# Patient Record
Sex: MALE | Race: WHITE | HISPANIC OR LATINO | Employment: FULL TIME | URBAN - METROPOLITAN AREA
[De-identification: names, ages, dates, MRNs, and addresses within clinical notes are randomized per-mention and may not be internally consistent; named-entity substitution may affect disease eponyms.]

---

## 2021-02-25 ENCOUNTER — IMMUNIZATION (OUTPATIENT)
Dept: OBSTETRICS AND GYNECOLOGY | Facility: CLINIC | Age: 66
End: 2021-02-25
Payer: MEDICARE

## 2021-02-25 DIAGNOSIS — Z23 NEED FOR VACCINATION: Primary | ICD-10-CM

## 2021-02-25 PROCEDURE — 0001A COVID-19, MRNA, LNP-S, PF, 30 MCG/0.3 ML DOSE VACCINE: ICD-10-PCS | Mod: CV19,,, | Performed by: FAMILY MEDICINE

## 2021-02-25 PROCEDURE — 91300 COVID-19, MRNA, LNP-S, PF, 30 MCG/0.3 ML DOSE VACCINE: ICD-10-PCS | Mod: ,,, | Performed by: FAMILY MEDICINE

## 2021-02-25 PROCEDURE — 91300 COVID-19, MRNA, LNP-S, PF, 30 MCG/0.3 ML DOSE VACCINE: CPT | Mod: ,,, | Performed by: FAMILY MEDICINE

## 2021-02-25 PROCEDURE — 0001A COVID-19, MRNA, LNP-S, PF, 30 MCG/0.3 ML DOSE VACCINE: CPT | Mod: CV19,,, | Performed by: FAMILY MEDICINE

## 2021-03-18 ENCOUNTER — IMMUNIZATION (OUTPATIENT)
Dept: OBSTETRICS AND GYNECOLOGY | Facility: CLINIC | Age: 66
End: 2021-03-18

## 2021-03-18 DIAGNOSIS — Z23 NEED FOR VACCINATION: Primary | ICD-10-CM

## 2021-03-18 PROCEDURE — 0002A COVID-19, MRNA, LNP-S, PF, 30 MCG/0.3 ML DOSE VACCINE: ICD-10-PCS | Mod: CV19,,, | Performed by: FAMILY MEDICINE

## 2021-03-18 PROCEDURE — 0002A COVID-19, MRNA, LNP-S, PF, 30 MCG/0.3 ML DOSE VACCINE: CPT | Mod: CV19,,, | Performed by: FAMILY MEDICINE

## 2021-03-18 PROCEDURE — 91300 COVID-19, MRNA, LNP-S, PF, 30 MCG/0.3 ML DOSE VACCINE: ICD-10-PCS | Mod: ,,, | Performed by: FAMILY MEDICINE

## 2021-03-18 PROCEDURE — 91300 COVID-19, MRNA, LNP-S, PF, 30 MCG/0.3 ML DOSE VACCINE: CPT | Mod: ,,, | Performed by: FAMILY MEDICINE

## 2022-12-21 ENCOUNTER — OFFICE VISIT (OUTPATIENT)
Dept: INTERNAL MEDICINE | Facility: CLINIC | Age: 67
End: 2022-12-21
Payer: MEDICARE

## 2022-12-21 VITALS
BODY MASS INDEX: 40.88 KG/M2 | DIASTOLIC BLOOD PRESSURE: 74 MMHG | HEART RATE: 95 BPM | RESPIRATION RATE: 20 BRPM | SYSTOLIC BLOOD PRESSURE: 134 MMHG | WEIGHT: 301.81 LBS | HEIGHT: 72 IN | OXYGEN SATURATION: 96 %

## 2022-12-21 DIAGNOSIS — G47.33 OSA (OBSTRUCTIVE SLEEP APNEA): ICD-10-CM

## 2022-12-21 DIAGNOSIS — R06.09 DOE (DYSPNEA ON EXERTION): ICD-10-CM

## 2022-12-21 DIAGNOSIS — Z11.59 SCREENING FOR VIRAL DISEASE: ICD-10-CM

## 2022-12-21 DIAGNOSIS — E66.01 CLASS 3 SEVERE OBESITY DUE TO EXCESS CALORIES WITH SERIOUS COMORBIDITY AND BODY MASS INDEX (BMI) OF 40.0 TO 44.9 IN ADULT: ICD-10-CM

## 2022-12-21 DIAGNOSIS — E78.00 PURE HYPERCHOLESTEROLEMIA: ICD-10-CM

## 2022-12-21 DIAGNOSIS — N40.0 BENIGN PROSTATIC HYPERPLASIA WITHOUT LOWER URINARY TRACT SYMPTOMS: ICD-10-CM

## 2022-12-21 DIAGNOSIS — J43.9 PULMONARY EMPHYSEMA, UNSPECIFIED EMPHYSEMA TYPE: ICD-10-CM

## 2022-12-21 DIAGNOSIS — L40.0 PSORIASIS VULGARIS: ICD-10-CM

## 2022-12-21 DIAGNOSIS — Z23 IMMUNIZATION DUE: ICD-10-CM

## 2022-12-21 DIAGNOSIS — G57.93 NEUROPATHY OF BOTH FEET: ICD-10-CM

## 2022-12-21 DIAGNOSIS — F51.01 PRIMARY INSOMNIA: ICD-10-CM

## 2022-12-21 DIAGNOSIS — E11.9 TYPE 2 DIABETES MELLITUS WITHOUT COMPLICATION, WITHOUT LONG-TERM CURRENT USE OF INSULIN: ICD-10-CM

## 2022-12-21 DIAGNOSIS — Z00.00 ENCOUNTER FOR MEDICAL EXAMINATION TO ESTABLISH CARE: Primary | ICD-10-CM

## 2022-12-21 DIAGNOSIS — Z87.891 FORMER CIGARETTE SMOKER: ICD-10-CM

## 2022-12-21 DIAGNOSIS — I10 PRIMARY HYPERTENSION: ICD-10-CM

## 2022-12-21 PROBLEM — E66.813 CLASS 3 SEVERE OBESITY DUE TO EXCESS CALORIES WITH SERIOUS COMORBIDITY AND BODY MASS INDEX (BMI) OF 40.0 TO 44.9 IN ADULT: Status: ACTIVE | Noted: 2022-12-21

## 2022-12-21 PROCEDURE — 99499 RISK ADDL DX/OHS AUDIT: ICD-10-PCS | Mod: S$GLB,,, | Performed by: NURSE PRACTITIONER

## 2022-12-21 PROCEDURE — 1101F PT FALLS ASSESS-DOCD LE1/YR: CPT | Mod: HCNC,CPTII,S$GLB, | Performed by: NURSE PRACTITIONER

## 2022-12-21 PROCEDURE — 99499 UNLISTED E&M SERVICE: CPT | Mod: S$GLB,,, | Performed by: NURSE PRACTITIONER

## 2022-12-21 PROCEDURE — 3288F PR FALLS RISK ASSESSMENT DOCUMENTED: ICD-10-PCS | Mod: HCNC,CPTII,S$GLB, | Performed by: NURSE PRACTITIONER

## 2022-12-21 PROCEDURE — 99999 PR PBB SHADOW E&M-EST. PATIENT-LVL IV: ICD-10-PCS | Mod: PBBFAC,HCNC,, | Performed by: NURSE PRACTITIONER

## 2022-12-21 PROCEDURE — 1125F AMNT PAIN NOTED PAIN PRSNT: CPT | Mod: HCNC,CPTII,S$GLB, | Performed by: NURSE PRACTITIONER

## 2022-12-21 PROCEDURE — G0009 ADMIN PNEUMOCOCCAL VACCINE: HCPCS | Mod: HCNC,S$GLB,, | Performed by: NURSE PRACTITIONER

## 2022-12-21 PROCEDURE — G0009 PNEUMOCOCCAL CONJUGATE VACCINE 20-VALENT: ICD-10-PCS | Mod: HCNC,S$GLB,, | Performed by: NURSE PRACTITIONER

## 2022-12-21 PROCEDURE — 1125F PR PAIN SEVERITY QUANTIFIED, PAIN PRESENT: ICD-10-PCS | Mod: HCNC,CPTII,S$GLB, | Performed by: NURSE PRACTITIONER

## 2022-12-21 PROCEDURE — 99204 PR OFFICE/OUTPT VISIT, NEW, LEVL IV, 45-59 MIN: ICD-10-PCS | Mod: HCNC,25,S$GLB, | Performed by: NURSE PRACTITIONER

## 2022-12-21 PROCEDURE — 1159F MED LIST DOCD IN RCRD: CPT | Mod: HCNC,CPTII,S$GLB, | Performed by: NURSE PRACTITIONER

## 2022-12-21 PROCEDURE — 3075F PR MOST RECENT SYSTOLIC BLOOD PRESS GE 130-139MM HG: ICD-10-PCS | Mod: HCNC,CPTII,S$GLB, | Performed by: NURSE PRACTITIONER

## 2022-12-21 PROCEDURE — 3288F FALL RISK ASSESSMENT DOCD: CPT | Mod: HCNC,CPTII,S$GLB, | Performed by: NURSE PRACTITIONER

## 2022-12-21 PROCEDURE — 1159F PR MEDICATION LIST DOCUMENTED IN MEDICAL RECORD: ICD-10-PCS | Mod: HCNC,CPTII,S$GLB, | Performed by: NURSE PRACTITIONER

## 2022-12-21 PROCEDURE — 3078F PR MOST RECENT DIASTOLIC BLOOD PRESSURE < 80 MM HG: ICD-10-PCS | Mod: HCNC,CPTII,S$GLB, | Performed by: NURSE PRACTITIONER

## 2022-12-21 PROCEDURE — 99204 OFFICE O/P NEW MOD 45 MIN: CPT | Mod: HCNC,25,S$GLB, | Performed by: NURSE PRACTITIONER

## 2022-12-21 PROCEDURE — 3008F PR BODY MASS INDEX (BMI) DOCUMENTED: ICD-10-PCS | Mod: HCNC,CPTII,S$GLB, | Performed by: NURSE PRACTITIONER

## 2022-12-21 PROCEDURE — 1101F PR PT FALLS ASSESS DOC 0-1 FALLS W/OUT INJ PAST YR: ICD-10-PCS | Mod: HCNC,CPTII,S$GLB, | Performed by: NURSE PRACTITIONER

## 2022-12-21 PROCEDURE — 90677 PNEUMOCOCCAL CONJUGATE VACCINE 20-VALENT: ICD-10-PCS | Mod: HCNC,S$GLB,, | Performed by: NURSE PRACTITIONER

## 2022-12-21 PROCEDURE — 90677 PCV20 VACCINE IM: CPT | Mod: HCNC,S$GLB,, | Performed by: NURSE PRACTITIONER

## 2022-12-21 PROCEDURE — 3075F SYST BP GE 130 - 139MM HG: CPT | Mod: HCNC,CPTII,S$GLB, | Performed by: NURSE PRACTITIONER

## 2022-12-21 PROCEDURE — 4010F ACE/ARB THERAPY RXD/TAKEN: CPT | Mod: HCNC,CPTII,S$GLB, | Performed by: NURSE PRACTITIONER

## 2022-12-21 PROCEDURE — 3078F DIAST BP <80 MM HG: CPT | Mod: HCNC,CPTII,S$GLB, | Performed by: NURSE PRACTITIONER

## 2022-12-21 PROCEDURE — 3008F BODY MASS INDEX DOCD: CPT | Mod: HCNC,CPTII,S$GLB, | Performed by: NURSE PRACTITIONER

## 2022-12-21 PROCEDURE — 99999 PR PBB SHADOW E&M-EST. PATIENT-LVL IV: CPT | Mod: PBBFAC,HCNC,, | Performed by: NURSE PRACTITIONER

## 2022-12-21 PROCEDURE — 4010F PR ACE/ARB THEARPY RXD/TAKEN: ICD-10-PCS | Mod: HCNC,CPTII,S$GLB, | Performed by: NURSE PRACTITIONER

## 2022-12-21 RX ORDER — TADALAFIL 5 MG/1
TABLET ORAL
COMMUNITY
Start: 2020-01-09

## 2022-12-21 RX ORDER — IRBESARTAN 150 MG/1
TABLET ORAL
COMMUNITY
Start: 2019-10-13 | End: 2022-12-21 | Stop reason: SDUPTHER

## 2022-12-21 RX ORDER — IRBESARTAN 150 MG/1
TABLET ORAL
Qty: 30 TABLET | Refills: 5 | Status: SHIPPED | OUTPATIENT
Start: 2022-12-21 | End: 2023-06-20 | Stop reason: SDUPTHER

## 2022-12-21 RX ORDER — MOMETASONE FUROATE 1 MG/G
CREAM TOPICAL
COMMUNITY

## 2022-12-21 RX ORDER — ROSUVASTATIN CALCIUM 20 MG/1
TABLET, COATED ORAL
COMMUNITY
End: 2023-09-18 | Stop reason: SDUPTHER

## 2022-12-21 RX ORDER — QUETIAPINE FUMARATE 25 MG/1
25 TABLET, FILM COATED ORAL NIGHTLY PRN
COMMUNITY

## 2022-12-21 RX ORDER — CLOBETASOL PROPIONATE 0.5 MG/G
OINTMENT TOPICAL
COMMUNITY
End: 2024-01-11

## 2022-12-21 RX ORDER — CLOBETASOL PROPIONATE 0.46 MG/ML
SOLUTION TOPICAL
COMMUNITY

## 2022-12-21 RX ORDER — KETOCONAZOLE 20 MG/ML
SHAMPOO, SUSPENSION TOPICAL
COMMUNITY

## 2022-12-21 RX ORDER — GABAPENTIN 800 MG/1
800 TABLET ORAL 2 TIMES DAILY
COMMUNITY
End: 2023-03-30 | Stop reason: SDUPTHER

## 2022-12-21 NOTE — ASSESSMENT & PLAN NOTE
"Essential (primary) hypertension;   Dx - 10 " off and on" , ran out of medications; irbesartan - 150mg  BP at goal ;  134/74    Refill irbesartan       "

## 2022-12-21 NOTE — PROGRESS NOTES
"Subjective:           Patient ID: Javad Coughlin is a 67 y.o. male.    Chief Complaint: Establish Care    Javad Coughlin is a 67 y.o. male, here to establish care     Last labs     Type 2 diabetes mellitus without complications  Dx 3 years ago ; now diet controlled  Losing weight   Notes he was 318 down to 301    Pure hypercholesterolemia, - takes rosuvastatin   Dx 3 yrs ago with DM2       Benign prostatic hyperplasia without lower urinary tract symptoms  Hyperspade -   No recent urology  Tadalafil for prostate     Essential (primary) hypertension;   Dx - 10yrs,  " off and on" , ran out of medications; irbesartan - 150mg     Obstructive sleep apnea (adult)   Dx - 30 yrs ago ; used CPAP for a while then it got old    Dermatology of psoriasis     Gabapentin for neuropathy- bilateral feet   800mg tid- takes 1 in am and 1.5 at night     Quetipaine for insomnia      Recent Covid  ;   Notes eczema, Pl[place] psoriasis' going this PM for injeciton= tremfya   Dr. Irwin Braun     Notes emphysema, quits smoking years ago, 4 years     Obesity -     Social ; , youngest of 12 children;   parents  in their 90s, no premature heart hx    Health Maintenance:  -CRC:colonoscopy with Dr. SONAL Mark last year - get records   -PSA- due     -Bone Density  -AAA- due + Ex smoker   -tobacco- quit 4 yrs ago, smoked 1-2ppd since age 11   -Vaccines- due for Tdap, PCV - give today    Review of Systems   Constitutional: Negative.    HENT:  Negative for congestion, ear pain, sinus pressure, sneezing and sore throat.    Eyes: Negative.    Respiratory:  Positive for shortness of breath (HOOPER). Negative for cough, chest tightness and wheezing.    Cardiovascular: Negative.  Negative for chest pain.   Gastrointestinal:  Negative for abdominal pain, blood in stool, constipation, diarrhea, nausea and vomiting.   Genitourinary:  Negative for difficulty urinating, dysuria and flank pain.   Musculoskeletal: Negative.  Negative for " joint swelling.   Skin:  Positive for rash.   Neurological:  Negative for dizziness, weakness and headaches.   Hematological: Negative.    Psychiatric/Behavioral: Negative.  Negative for behavioral problems and confusion.      Objective:      Physical Exam  Vitals and nursing note reviewed.   Constitutional:       General: He is not in acute distress.     Appearance: He is well-developed. He is obese. He is not ill-appearing, toxic-appearing or diaphoretic.   HENT:      Head: Normocephalic and atraumatic.      Nose: Nose normal.   Eyes:      Pupils: Pupils are equal, round, and reactive to light.   Cardiovascular:      Rate and Rhythm: Normal rate and regular rhythm.      Heart sounds: No murmur heard.  Pulmonary:      Effort: Pulmonary effort is normal. No respiratory distress.      Breath sounds: Normal breath sounds. No stridor. No wheezing or rhonchi.      Comments: Diminished   Abdominal:      General: Bowel sounds are normal.      Palpations: Abdomen is soft.   Musculoskeletal:         General: No swelling, tenderness, deformity or signs of injury. Normal range of motion.      Cervical back: Normal range of motion and neck supple.      Right lower leg: No edema.      Left lower leg: No edema.   Skin:     General: Skin is warm and dry.      Capillary Refill: Capillary refill takes less than 2 seconds.      Findings: Rash (face, forhead) present.   Neurological:      Mental Status: He is alert and oriented to person, place, and time.   Psychiatric:         Behavior: Behavior normal.         Thought Content: Thought content normal.         Judgment: Judgment normal.       Assessment:       1. Encounter for medical examination to establish care    2. Primary hypertension    3. Type 2 diabetes mellitus without complication, without long-term current use of insulin    4. Benign prostatic hyperplasia without lower urinary tract symptoms    5. ANTONI (obstructive sleep apnea)    6. Pure hypercholesterolemia    7. HOOPER  "(dyspnea on exertion)    8. Former cigarette smoker    9. Screening for viral disease    10. Pulmonary emphysema, unspecified emphysema type    11. Class 3 severe obesity due to excess calories with serious comorbidity and body mass index (BMI) of 40.0 to 44.9 in adult    12. Psoriasis vulgaris    13. Neuropathy of both feet    14. Primary insomnia        Plan:   1. Encounter for medical examination to establish care  -     CBC Auto Differential; Future; Expected date: 12/21/2022  -     Comprehensive Metabolic Panel; Future; Expected date: 12/21/2022  -     Echo; Future; Expected date: 12/22/2022    2. Primary hypertension  Overview:  12/21/22 Dx approx 10 yrs ago     Assessment & Plan:  Essential (primary) hypertension;   Dx - 10 " off and on" , ran out of medications; irbesartan - 150mg  BP at goal ;  134/74    Refill irbesartan         Orders:  -     irbesartan (AVAPRO) 150 MG tablet; irbesartan 150 mg tablet  Dispense: 30 tablet; Refill: 5  -     CBC Auto Differential; Future; Expected date: 12/21/2022  -     Comprehensive Metabolic Panel; Future; Expected date: 12/21/2022  -     TSH; Future; Expected date: 12/21/2022  -     US AAA Screening; Future; Expected date: 12/21/2022  -     Echo; Future; Expected date: 12/22/2022    3. Type 2 diabetes mellitus without complication, without long-term current use of insulin  Overview:  Prior Dx 3 yrs ago; tx with Ozempic- was getting samples     Assessment & Plan:  Pt states he lost weight and is no longer a diabetic   Will get labs     Orders:  -     Hemoglobin A1C; Future; Expected date: 12/21/2022  -     Lipid Panel; Future; Expected date: 12/21/2022  -     Microalbumin/Creatinine Ratio, Urine; Future; Expected date: 12/21/2022  -     Echo; Future; Expected date: 12/22/2022    4. Benign prostatic hyperplasia without lower urinary tract symptoms  Assessment & Plan:  Hx of this - also notes hyopspadis     Prior urology eval   tidalafil Rx     Orders:  -     Echo; Future; " Expected date: 12/22/2022    5. ANTONI (obstructive sleep apnea)  Overview:  Dx ~ 30 yrs ago, no longer using CPAP     Orders:  -     Echo; Future; Expected date: 12/22/2022    6. Pure hypercholesterolemia  Overview:  Dx ~ 3 yrs ago with DM2    Assessment & Plan:  Continue crestor     Orders:  -     Lipid Panel; Future; Expected date: 12/21/2022  -     Echo; Future; Expected date: 12/22/2022    7. HOOPER (dyspnea on exertion)  -     Ambulatory referral/consult to Cardiology; Future; Expected date: 12/28/2022  -     X-Ray Chest PA And Lateral; Future; Expected date: 12/21/2022  -     SCHEDULED EKG 12-LEAD (to Muse); Future  -     Complete PFT w/ bronchodilator; Future  -     Echo; Future; Expected date: 12/22/2022    8. Former cigarette smoker  -     X-Ray Chest PA And Lateral; Future; Expected date: 12/21/2022  -     SCHEDULED EKG 12-LEAD (to Muse); Future  -     US AAA Screening; Future; Expected date: 12/21/2022  -     Echo; Future; Expected date: 12/22/2022    9. Screening for viral disease  -     Hepatitis C Antibody; Future; Expected date: 12/21/2022    10. Pulmonary emphysema, unspecified emphysema type  Overview:  Notes emphysema, quits smoking years ago, 4 years   Had PFTs years ago     Assessment & Plan:  Noting chronic HOOPER   Get CXR, PFTs  EKG, Echo        11. Class 3 severe obesity due to excess calories with serious comorbidity and body mass index (BMI) of 40.0 to 44.9 in adult  Assessment & Plan:    # The patient is asked to make an attempt to improve diet and exercise patterns to aid in medical management of this problem.     # Eat  5 small meals a day.     # Cut out high carbohydrate  foods : bread, rice, pasta, potatoes.     # Exercise/walk 5x/week for at least 30-45  minutes.              12. Psoriasis vulgaris  Overview:  Follows with Dermatologist, Dr. Braun       Assessment & Plan:  Due for injection tremfya per dermatology       13. Neuropathy of both feet  Assessment & Plan:  Continue gabapentin ;  opathy- bilateral feet   800mg tid- takes 1 in am and 1.5 at night         14. Primary insomnia  Assessment & Plan:  Takes seroquel nightly prn   Notes hit helps ; has been stable and not needing much      Other orders  -     Pneumococcal Conjugate Vaccine (20 Valent) (IM)       F/u in 1m to review labs, EKG, ECHO, PFTs   Referral to cardiology

## 2022-12-22 ENCOUNTER — HOSPITAL ENCOUNTER (OUTPATIENT)
Dept: PULMONOLOGY | Facility: HOSPITAL | Age: 67
Discharge: HOME | End: 2022-12-22
Attending: NURSE PRACTITIONER
Payer: MEDICARE

## 2022-12-22 ENCOUNTER — HOSPITAL ENCOUNTER (OUTPATIENT)
Dept: RADIOLOGY | Facility: HOSPITAL | Age: 67
Discharge: HOME | End: 2022-12-22
Attending: NURSE PRACTITIONER
Payer: MEDICARE

## 2022-12-22 DIAGNOSIS — I10 PRIMARY HYPERTENSION: ICD-10-CM

## 2022-12-22 DIAGNOSIS — Z87.891 FORMER CIGARETTE SMOKER: ICD-10-CM

## 2022-12-22 DIAGNOSIS — G47.33 OSA (OBSTRUCTIVE SLEEP APNEA): ICD-10-CM

## 2022-12-22 DIAGNOSIS — E11.9 TYPE 2 DIABETES MELLITUS WITHOUT COMPLICATION, WITHOUT LONG-TERM CURRENT USE OF INSULIN: ICD-10-CM

## 2022-12-22 DIAGNOSIS — N40.0 BENIGN PROSTATIC HYPERPLASIA WITHOUT LOWER URINARY TRACT SYMPTOMS: ICD-10-CM

## 2022-12-22 DIAGNOSIS — R06.09 DOE (DYSPNEA ON EXERTION): ICD-10-CM

## 2022-12-22 DIAGNOSIS — Z00.00 ENCOUNTER FOR MEDICAL EXAMINATION TO ESTABLISH CARE: ICD-10-CM

## 2022-12-22 DIAGNOSIS — E78.00 PURE HYPERCHOLESTEROLEMIA: ICD-10-CM

## 2022-12-22 LAB
ASCENDING AORTA: 2.82 CM
AV INDEX (PROSTH): 0.98
AV MEAN GRADIENT: 5 MMHG
AV PEAK GRADIENT: 8 MMHG
AV VALVE AREA: 3.74 CM2
AV VELOCITY RATIO: 0.98
CV ECHO LV RWT: 0.3 CM
DOP CALC AO PEAK VEL: 1.38 M/S
DOP CALC AO VTI: 34.2 CM
DOP CALC LVOT AREA: 3.8 CM2
DOP CALC LVOT DIAMETER: 2.21 CM
DOP CALC LVOT PEAK VEL: 1.35 M/S
DOP CALC LVOT STROKE VOLUME: 128.06 CM3
DOP CALC MV VTI: 43.1 CM
DOP CALC RVOT PEAK VEL: 0.89 M/S
DOP CALC RVOT VTI: 20.3 CM
DOP CALCLVOT PEAK VEL VTI: 33.4 CM
E WAVE DECELERATION TIME: 250.99 MSEC
E/A RATIO: 0.75
E/E' RATIO: 9.89 M/S
ECHO LV POSTERIOR WALL: 0.9 CM (ref 0.6–1.1)
EJECTION FRACTION: 65 %
FRACTIONAL SHORTENING: 35 % (ref 28–44)
INTERVENTRICULAR SEPTUM: 1.25 CM (ref 0.6–1.1)
IVC DIAMETER: 1.48 CM
IVRT: 114.18 MSEC
LA MAJOR: 5.11 CM
LA MINOR: 4.85 CM
LEFT ATRIUM SIZE: 4.29 CM
LEFT ATRIUM VOLUME MOD: 70.82 CM3
LEFT INTERNAL DIMENSION IN SYSTOLE: 3.94 CM (ref 2.1–4)
LEFT VENTRICLE DIASTOLIC VOLUME: 183.95 ML
LEFT VENTRICLE SYSTOLIC VOLUME: 67.64 ML
LEFT VENTRICULAR INTERNAL DIMENSION IN DIASTOLE: 6.06 CM (ref 3.5–6)
LEFT VENTRICULAR MASS: 275.86 G
LV LATERAL E/E' RATIO: 10.44 M/S
LV SEPTAL E/E' RATIO: 9.4 M/S
LVOT MG: 5.18 MMHG
LVOT MV: 1.1 CM/S
MV MEAN GRADIENT: 3 MMHG
MV PEAK A VEL: 1.26 M/S
MV PEAK E VEL: 0.94 M/S
MV PEAK GRADIENT: 7 MMHG
MV STENOSIS PRESSURE HALF TIME: 72.79 MS
MV VALVE AREA BY CONTINUITY EQUATION: 2.97 CM2
MV VALVE AREA P 1/2 METHOD: 3.02 CM2
PISA TR MAX VEL: 1.13 M/S
PULM VEIN S/D RATIO: 1.23
PV MEAN GRADIENT: 2.34 MMHG
PV MV: 0.72 M/S
PV PEAK D VEL: 0.44 M/S
PV PEAK S VEL: 0.54 M/S
PV PEAK VELOCITY: 0.92 CM/S
RA MAJOR: 4.62 CM
RA PRESSURE: 8 MMHG
RA WIDTH: 3.51 CM
RIGHT VENTRICULAR END-DIASTOLIC DIMENSION: 3.71 CM
SINUS: 2.85 CM
STJ: 2.65 CM
TDI LATERAL: 0.09 M/S
TDI SEPTAL: 0.1 M/S
TDI: 0.1 M/S
TR MAX PG: 5 MMHG
TRICUSPID ANNULAR PLANE SYSTOLIC EXCURSION: 2.5 CM
TV REST PULMONARY ARTERY PRESSURE: 13 MMHG

## 2022-12-22 PROCEDURE — 94060 EVALUATION OF WHEEZING: CPT | Mod: HCNC

## 2022-12-22 PROCEDURE — 94729 DIFFUSING CAPACITY: CPT | Mod: HCNC

## 2022-12-22 PROCEDURE — 94729 PR C02/MEMBANE DIFFUSE CAPACITY: ICD-10-PCS | Mod: 26,HCNC,, | Performed by: INTERNAL MEDICINE

## 2022-12-22 PROCEDURE — 93306 TTE W/DOPPLER COMPLETE: CPT | Mod: HCNC

## 2022-12-22 PROCEDURE — 94060 EVALUATION OF WHEEZING: CPT | Mod: 26,HCNC,, | Performed by: INTERNAL MEDICINE

## 2022-12-22 PROCEDURE — 93306 ECHO (CUPID ONLY): ICD-10-PCS | Mod: 26,HCNC,, | Performed by: INTERNAL MEDICINE

## 2022-12-22 PROCEDURE — 94727 GAS DIL/WSHOT DETER LNG VOL: CPT | Mod: 26,HCNC,, | Performed by: INTERNAL MEDICINE

## 2022-12-22 PROCEDURE — 93005 ELECTROCARDIOGRAM TRACING: CPT | Mod: HCNC

## 2022-12-22 PROCEDURE — 99900031 HC PATIENT EDUCATION (STAT): Mod: HCNC

## 2022-12-22 PROCEDURE — 99900035 HC TECH TIME PER 15 MIN (STAT): Mod: HCNC

## 2022-12-22 PROCEDURE — 71046 X-RAY EXAM CHEST 2 VIEWS: CPT | Mod: 26,HCNC,, | Performed by: RADIOLOGY

## 2022-12-22 PROCEDURE — 94729 DIFFUSING CAPACITY: CPT | Mod: 26,HCNC,, | Performed by: INTERNAL MEDICINE

## 2022-12-22 PROCEDURE — 94060 PR EVAL OF BRONCHOSPASM: ICD-10-PCS | Mod: 26,HCNC,, | Performed by: INTERNAL MEDICINE

## 2022-12-22 PROCEDURE — 94727 GAS DIL/WSHOT DETER LNG VOL: CPT | Mod: HCNC

## 2022-12-22 PROCEDURE — 93010 EKG 12-LEAD: ICD-10-PCS | Mod: HCNC,,, | Performed by: INTERNAL MEDICINE

## 2022-12-22 PROCEDURE — 71046 X-RAY EXAM CHEST 2 VIEWS: CPT | Mod: TC,HCNC

## 2022-12-22 PROCEDURE — 94799 UNLISTED PULMONARY SVC/PX: CPT | Mod: 26,HCNC,, | Performed by: INTERNAL MEDICINE

## 2022-12-22 PROCEDURE — 94727 PR PULM FUNCTION TEST BY GAS: ICD-10-PCS | Mod: 26,HCNC,, | Performed by: INTERNAL MEDICINE

## 2022-12-22 PROCEDURE — 93306 TTE W/DOPPLER COMPLETE: CPT | Mod: 26,HCNC,, | Performed by: INTERNAL MEDICINE

## 2022-12-22 PROCEDURE — 71046 XR CHEST PA AND LATERAL: ICD-10-PCS | Mod: 26,HCNC,, | Performed by: RADIOLOGY

## 2022-12-22 PROCEDURE — 93010 ELECTROCARDIOGRAM REPORT: CPT | Mod: HCNC,,, | Performed by: INTERNAL MEDICINE

## 2022-12-22 PROCEDURE — 94799 PR NIF/PIF PULMONARY FUNCTION TEST: ICD-10-PCS | Mod: 26,HCNC,, | Performed by: INTERNAL MEDICINE

## 2022-12-22 NOTE — PROGRESS NOTES
Normal rhythm, non- specific Twave abnormality but no change suggestive of heart damage; f/u with cardiology as planned

## 2022-12-23 DIAGNOSIS — R79.89 ABNORMAL TSH: ICD-10-CM

## 2022-12-23 DIAGNOSIS — R76.8 POSITIVE HEPATITIS C ANTIBODY TEST: Primary | ICD-10-CM

## 2022-12-23 PROBLEM — R06.09 DOE (DYSPNEA ON EXERTION): Status: ACTIVE | Noted: 2022-12-23

## 2022-12-23 LAB
BRPFT: ABNORMAL
DLCO SINGLE BREATH LLN: 22.73
DLCO SINGLE BREATH PRE REF: 67.8 %
DLCO SINGLE BREATH REF: 29.66
DLCOC SBVA LLN: 2.84
DLCOC SBVA REF: 3.94
DLCOC SINGLE BREATH LLN: 22.73
DLCOC SINGLE BREATH REF: 29.66
DLCOVA LLN: 2.84
DLCOVA PRE REF: 98.3 %
DLCOVA PRE: 3.87 ML/(MIN*MMHG*L) (ref 2.84–5.04)
DLCOVA REF: 3.94
ERVN2 LLN: -16448.83
ERVN2 PRE REF: 40.1 %
ERVN2 PRE: 0.47 L (ref -16448.83–16451.17)
ERVN2 REF: 1.17
FEF 25 75 CHG: 39 %
FEF 25 75 LLN: 1.25
FEF 25 75 POST REF: 35.5 %
FEF 25 75 PRE REF: 25.5 %
FEF 25 75 REF: 2.75
FET100 CHG: 13 %
FEV1 CHG: 32 %
FEV1 FVC CHG: 10.7 %
FEV1 FVC LLN: 63
FEV1 FVC POST REF: 74.5 %
FEV1 FVC PRE REF: 67.3 %
FEV1 FVC REF: 76
FEV1 LLN: 2.61
FEV1 POST REF: 56.2 %
FEV1 PRE REF: 42.6 %
FEV1 REF: 3.56
FRCN2 LLN: 2.8
FRCN2 PRE REF: 107.9 %
FRCN2 REF: 3.78
FVC CHG: 19.3 %
FVC LLN: 3.52
FVC POST REF: 75.2 %
FVC PRE REF: 63 %
FVC REF: 4.7
IVC PRE: 2.75 L (ref 3.52–5.89)
IVC SINGLE BREATH LLN: 3.52
IVC SINGLE BREATH PRE REF: 58.4 %
IVC SINGLE BREATH REF: 4.7
MEP LLN: 83
MEP PRE REF: 76 %
MEP PRE: 75.96 CMH2O (ref 83.23–116.78)
MEP REF: 100
MIP LLN: 58
MIP PRE REF: 104.4 %
MIP PRE: 78.27 CMH2O (ref 58.23–91.78)
MIP REF: 75
MVV LLN: 118
MVV PRE REF: 26.1 %
MVV REF: 138
PEF CHG: 11.9 %
PEF LLN: 6.74
PEF POST REF: 40.2 %
PEF PRE REF: 35.9 %
PEF REF: 9.19
POST FEF 25 75: 0.98 L/S (ref 1.25–4.84)
POST FET 100: 8.82 SEC
POST FEV1 FVC: 56.74 % (ref 63.33–87.44)
POST FEV1: 2 L (ref 2.61–4.45)
POST FVC: 3.53 L (ref 3.52–5.89)
POST PEF: 3.69 L/S (ref 6.74–11.64)
PRE DLCO: 20.1 ML/(MIN*MMHG) (ref 22.73–36.58)
PRE FEF 25 75: 0.7 L/S (ref 1.25–4.84)
PRE FET 100: 7.81 SEC
PRE FEV1 FVC: 51.24 % (ref 63.33–87.44)
PRE FEV1: 1.52 L (ref 2.61–4.45)
PRE FRC N2: 4.08 L (ref 2.8–4.77)
PRE FVC: 2.96 L (ref 3.52–5.89)
PRE MVV: 36.08 L/MIN (ref 117.63–159.15)
PRE PEF: 3.3 L/S (ref 6.74–11.64)
RVN2 LLN: 1.94
RVN2 PRE REF: 138.1 %
RVN2 PRE: 3.62 L (ref 1.94–3.29)
RVN2 REF: 2.62
RVN2TLCN2 LLN: 30.72
RVN2TLCN2 PRE REF: 134 %
RVN2TLCN2 PRE: 53.21 % (ref 30.72–48.68)
RVN2TLCN2 REF: 39.7
TLCN2 LLN: 6.38
TLCN2 PRE REF: 90.2 %
TLCN2 PRE: 6.79 L (ref 6.38–8.68)
TLCN2 REF: 7.53
VA PRE: 5.19 L (ref 7.38–7.38)
VA SINGLE BREATH LLN: 7.38
VA SINGLE BREATH PRE REF: 70.4 %
VA SINGLE BREATH REF: 7.38
VCMAXN2 LLN: 3.52
VCMAXN2 PRE REF: 67.7 %
VCMAXN2 PRE: 3.18 L (ref 3.52–5.89)
VCMAXN2 REF: 4.7

## 2022-12-23 NOTE — PROGRESS NOTES
Essentially normal; EF ; heart squeeze is normal , mild diastolic dysfunction;expected with Age, HTN;  needs to continue good BP control

## 2022-12-28 ENCOUNTER — TELEPHONE (OUTPATIENT)
Dept: INTERNAL MEDICINE | Facility: CLINIC | Age: 67
End: 2022-12-28
Payer: MEDICARE

## 2022-12-28 DIAGNOSIS — E11.9 TYPE 2 DIABETES MELLITUS WITHOUT COMPLICATION, WITHOUT LONG-TERM CURRENT USE OF INSULIN: Primary | ICD-10-CM

## 2022-12-28 DIAGNOSIS — E11.9 TYPE 2 DIABETES MELLITUS WITHOUT COMPLICATION, UNSPECIFIED WHETHER LONG TERM INSULIN USE: ICD-10-CM

## 2022-12-28 NOTE — TELEPHONE ENCOUNTER
----- Message from Jeimy Treviño NP sent at 12/23/2022 10:23 AM CST -----  Please call the patient regarding his abnormal result.   Hep C Antibody positive; see if he has Hx any hx of Hepatitis? Blood transfusion hx? ; does not mean he has HEP C but need to  get reflex HCV RNA  A1C is > 6.4 consistent with Type II DM; will need to resume medications for this; see if he tolerated metformin in the past/ I know he was on Ozempic, can resume that or another in same class.   TSH abn; free T4 normal; subclinical hypothyroidism; discuss at follow up   Will place order for HCV RNA a

## 2022-12-28 NOTE — TELEPHONE ENCOUNTER
Patient is aware of positive Hep C status. Please advise if you would like to proceed with additional labs. In regards to DM meds, he does not wish to take the Ozempic due to cost. He is willing to get back on metformin. He will need a Rx sent to [facility] pharmacy. Reminded him of appt scheduled for 1/17/23

## 2022-12-29 ENCOUNTER — PATIENT MESSAGE (OUTPATIENT)
Dept: ADMINISTRATIVE | Facility: HOSPITAL | Age: 67
End: 2022-12-29
Payer: MEDICARE

## 2023-01-03 PROBLEM — J44.9 COPD (CHRONIC OBSTRUCTIVE PULMONARY DISEASE): Status: ACTIVE | Noted: 2022-12-21

## 2023-01-03 PROBLEM — B18.2 CHRONIC HEPATITIS C WITHOUT HEPATIC COMA: Status: ACTIVE | Noted: 2023-01-03

## 2023-01-03 RX ORDER — METFORMIN HYDROCHLORIDE EXTENDED-RELEASE TABLETS 500 MG/1
500 TABLET, FILM COATED, EXTENDED RELEASE ORAL 2 TIMES DAILY WITH MEALS
Qty: 180 TABLET | Refills: 1 | Status: SHIPPED | OUTPATIENT
Start: 2023-01-03 | End: 2023-01-17

## 2023-01-03 NOTE — TELEPHONE ENCOUNTER
Need a little more information so yes would like Hep C RNA whenever he returns and see if he was seen by hepatologist or received tx for this  Will send metformin Rx to start

## 2023-01-03 NOTE — PROGRESS NOTES
Please call the patient regarding his abnormal result.   PFTs showing moderate obstruction  Discuss at f/u

## 2023-01-04 ENCOUNTER — LAB VISIT (OUTPATIENT)
Dept: LAB | Facility: HOSPITAL | Age: 68
End: 2023-01-04
Attending: INTERNAL MEDICINE
Payer: MEDICARE

## 2023-01-04 ENCOUNTER — OFFICE VISIT (OUTPATIENT)
Dept: CARDIOLOGY | Facility: CLINIC | Age: 68
End: 2023-01-04
Payer: MEDICARE

## 2023-01-04 VITALS
BODY MASS INDEX: 41.55 KG/M2 | SYSTOLIC BLOOD PRESSURE: 126 MMHG | HEART RATE: 91 BPM | HEIGHT: 72 IN | WEIGHT: 306.75 LBS | OXYGEN SATURATION: 96 % | DIASTOLIC BLOOD PRESSURE: 59 MMHG

## 2023-01-04 DIAGNOSIS — I10 PRIMARY HYPERTENSION: ICD-10-CM

## 2023-01-04 DIAGNOSIS — G47.33 OSA (OBSTRUCTIVE SLEEP APNEA): ICD-10-CM

## 2023-01-04 DIAGNOSIS — E78.00 PURE HYPERCHOLESTEROLEMIA: ICD-10-CM

## 2023-01-04 DIAGNOSIS — J43.9 PULMONARY EMPHYSEMA, UNSPECIFIED EMPHYSEMA TYPE: ICD-10-CM

## 2023-01-04 DIAGNOSIS — R06.09 DOE (DYSPNEA ON EXERTION): ICD-10-CM

## 2023-01-04 DIAGNOSIS — I70.0 AORTIC ATHEROSCLEROSIS: ICD-10-CM

## 2023-01-04 DIAGNOSIS — E66.01 CLASS 3 SEVERE OBESITY DUE TO EXCESS CALORIES WITH SERIOUS COMORBIDITY AND BODY MASS INDEX (BMI) OF 40.0 TO 44.9 IN ADULT: ICD-10-CM

## 2023-01-04 DIAGNOSIS — R94.31 ABNORMAL ELECTROCARDIOGRAM (ECG) (EKG): ICD-10-CM

## 2023-01-04 DIAGNOSIS — R76.8 POSITIVE HEPATITIS C ANTIBODY TEST: ICD-10-CM

## 2023-01-04 DIAGNOSIS — E11.9 TYPE 2 DIABETES MELLITUS WITHOUT COMPLICATION, WITHOUT LONG-TERM CURRENT USE OF INSULIN: ICD-10-CM

## 2023-01-04 LAB — BNP SERPL-MCNC: 33 PG/ML (ref 0–99)

## 2023-01-04 PROCEDURE — 87522 HEPATITIS C REVRS TRNSCRPJ: CPT | Mod: HCNC | Performed by: NURSE PRACTITIONER

## 2023-01-04 PROCEDURE — 1160F PR REVIEW ALL MEDS BY PRESCRIBER/CLIN PHARMACIST DOCUMENTED: ICD-10-PCS | Mod: HCNC,CPTII,S$GLB, | Performed by: INTERNAL MEDICINE

## 2023-01-04 PROCEDURE — 1160F RVW MEDS BY RX/DR IN RCRD: CPT | Mod: HCNC,CPTII,S$GLB, | Performed by: INTERNAL MEDICINE

## 2023-01-04 PROCEDURE — 3074F SYST BP LT 130 MM HG: CPT | Mod: HCNC,CPTII,S$GLB, | Performed by: INTERNAL MEDICINE

## 2023-01-04 PROCEDURE — 3288F FALL RISK ASSESSMENT DOCD: CPT | Mod: HCNC,CPTII,S$GLB, | Performed by: INTERNAL MEDICINE

## 2023-01-04 PROCEDURE — 36415 COLL VENOUS BLD VENIPUNCTURE: CPT | Mod: HCNC | Performed by: INTERNAL MEDICINE

## 2023-01-04 PROCEDURE — 1101F PT FALLS ASSESS-DOCD LE1/YR: CPT | Mod: HCNC,CPTII,S$GLB, | Performed by: INTERNAL MEDICINE

## 2023-01-04 PROCEDURE — 3288F PR FALLS RISK ASSESSMENT DOCUMENTED: ICD-10-PCS | Mod: HCNC,CPTII,S$GLB, | Performed by: INTERNAL MEDICINE

## 2023-01-04 PROCEDURE — 99204 OFFICE O/P NEW MOD 45 MIN: CPT | Mod: HCNC,S$GLB,, | Performed by: INTERNAL MEDICINE

## 2023-01-04 PROCEDURE — 1159F PR MEDICATION LIST DOCUMENTED IN MEDICAL RECORD: ICD-10-PCS | Mod: HCNC,CPTII,S$GLB, | Performed by: INTERNAL MEDICINE

## 2023-01-04 PROCEDURE — 83880 ASSAY OF NATRIURETIC PEPTIDE: CPT | Mod: HCNC | Performed by: INTERNAL MEDICINE

## 2023-01-04 PROCEDURE — 36415 COLL VENOUS BLD VENIPUNCTURE: CPT | Mod: HCNC | Performed by: NURSE PRACTITIONER

## 2023-01-04 PROCEDURE — 1126F PR PAIN SEVERITY QUANTIFIED, NO PAIN PRESENT: ICD-10-PCS | Mod: HCNC,CPTII,S$GLB, | Performed by: INTERNAL MEDICINE

## 2023-01-04 PROCEDURE — 3078F DIAST BP <80 MM HG: CPT | Mod: HCNC,CPTII,S$GLB, | Performed by: INTERNAL MEDICINE

## 2023-01-04 PROCEDURE — 3008F BODY MASS INDEX DOCD: CPT | Mod: HCNC,CPTII,S$GLB, | Performed by: INTERNAL MEDICINE

## 2023-01-04 PROCEDURE — 99204 PR OFFICE/OUTPT VISIT, NEW, LEVL IV, 45-59 MIN: ICD-10-PCS | Mod: HCNC,S$GLB,, | Performed by: INTERNAL MEDICINE

## 2023-01-04 PROCEDURE — 3078F PR MOST RECENT DIASTOLIC BLOOD PRESSURE < 80 MM HG: ICD-10-PCS | Mod: HCNC,CPTII,S$GLB, | Performed by: INTERNAL MEDICINE

## 2023-01-04 PROCEDURE — 99999 PR PBB SHADOW E&M-EST. PATIENT-LVL III: CPT | Mod: PBBFAC,HCNC,, | Performed by: INTERNAL MEDICINE

## 2023-01-04 PROCEDURE — 3074F PR MOST RECENT SYSTOLIC BLOOD PRESSURE < 130 MM HG: ICD-10-PCS | Mod: HCNC,CPTII,S$GLB, | Performed by: INTERNAL MEDICINE

## 2023-01-04 PROCEDURE — 99999 PR PBB SHADOW E&M-EST. PATIENT-LVL III: ICD-10-PCS | Mod: PBBFAC,HCNC,, | Performed by: INTERNAL MEDICINE

## 2023-01-04 PROCEDURE — 1101F PR PT FALLS ASSESS DOC 0-1 FALLS W/OUT INJ PAST YR: ICD-10-PCS | Mod: HCNC,CPTII,S$GLB, | Performed by: INTERNAL MEDICINE

## 2023-01-04 PROCEDURE — 1126F AMNT PAIN NOTED NONE PRSNT: CPT | Mod: HCNC,CPTII,S$GLB, | Performed by: INTERNAL MEDICINE

## 2023-01-04 PROCEDURE — 1159F MED LIST DOCD IN RCRD: CPT | Mod: HCNC,CPTII,S$GLB, | Performed by: INTERNAL MEDICINE

## 2023-01-04 PROCEDURE — 3008F PR BODY MASS INDEX (BMI) DOCUMENTED: ICD-10-PCS | Mod: HCNC,CPTII,S$GLB, | Performed by: INTERNAL MEDICINE

## 2023-01-04 RX ORDER — FUROSEMIDE 20 MG/1
20 TABLET ORAL DAILY
Qty: 90 TABLET | Refills: 3 | Status: SHIPPED | OUTPATIENT
Start: 2023-01-04 | End: 2023-01-04 | Stop reason: SDUPTHER

## 2023-01-04 RX ORDER — FUROSEMIDE 20 MG/1
20 TABLET ORAL DAILY
Qty: 90 TABLET | Refills: 3 | Status: SHIPPED | OUTPATIENT
Start: 2023-01-04 | End: 2023-02-08

## 2023-01-04 NOTE — TELEPHONE ENCOUNTER
I called the lab to request that they add Hep C RNA to labs drawn this morning. Notified patient that Jeimy sent Rx for metformin to [facility] pharmacy. Patient has appt to follow up with Jeimy on 1/17/23

## 2023-01-04 NOTE — PROGRESS NOTES
[facility]     Subjective:    Patient ID:  Javad Coughlin is a 67 y.o. male who presents for evaluation of Shortness of Breath, Hypertension, Hyperlipidemia, and Diabetes Mellitus    Review of patient's allergies indicates:  No Known Allergies   He has developed shortness of breath which started 3-4 years ago.  He has quit smoking cigarettes.  He does have COPD diagnosis.  He can only walk less than a city block without stopping for shortness of breath.  He has no history of chest pain.  His most recent Electrocardiogram did show ST T wave abnormality of the lateral leads.  He has never had a stress test.    He is diabetic.  He has hypertension.  His wife states his blood pressures are stable.  He has been diabetic for a couple of years.  He currently takes metformin.  He was on Jardiance but his insurance would not pay for it.  He is on rosuvastatin with LDL 73 mg% mixed hyperlipidemia picture with HDL 28 triglycerides 134 mg%.  He states he is fairly sedentary because of back problems for the last 15 years.  He had a recent echo showing normal ejection fraction without pulmonary hypertension or LVH documented.  There was mild LV enlargement.      Review of Systems   Constitutional: Positive for weight gain. Negative for chills, decreased appetite, diaphoresis, fever, malaise/fatigue and night sweats.   HENT:  Negative for congestion, ear discharge, ear pain, hearing loss, hoarse voice, nosebleeds, odynophagia, sore throat, stridor and tinnitus.    Eyes:  Negative for blurred vision, discharge, double vision, pain, photophobia, redness, vision loss in left eye, vision loss in right eye, visual disturbance and visual halos.   Cardiovascular:  Positive for dyspnea on exertion. Negative for chest pain, claudication, cyanosis, irregular heartbeat, leg swelling, near-syncope, orthopnea, palpitations, paroxysmal nocturnal dyspnea and  syncope.   Respiratory:  Positive for shortness of breath. Negative for cough, hemoptysis, sleep disturbances due to breathing, snoring, sputum production and wheezing.    Endocrine: Negative for cold intolerance, heat intolerance, polydipsia, polyphagia and polyuria.   Hematologic/Lymphatic: Negative for adenopathy and bleeding problem. Does not bruise/bleed easily.   Skin:  Negative for color change, dry skin, flushing, itching, nail changes, poor wound healing, rash, skin cancer, suspicious lesions and unusual hair distribution.   Musculoskeletal:  Positive for back pain. Negative for arthritis, falls, gout, joint pain, joint swelling, muscle cramps, muscle weakness, myalgias, neck pain and stiffness.   Gastrointestinal:  Negative for bloating, abdominal pain, anorexia, change in bowel habit, bowel incontinence, constipation, diarrhea, dysphagia, excessive appetite, flatus, heartburn, hematemesis, hematochezia, hemorrhoids, jaundice, melena, nausea and vomiting.   Genitourinary:  Negative for bladder incontinence, decreased libido, dysuria, flank pain, frequency, genital sores, hematuria, hesitancy, incomplete emptying, nocturia and urgency.   Neurological:  Negative for aphonia, brief paralysis, difficulty with concentration, disturbances in coordination, excessive daytime sleepiness, dizziness, focal weakness, headaches, light-headedness, loss of balance, numbness, paresthesias, seizures, sensory change, tremors, vertigo and weakness.   Psychiatric/Behavioral:  Negative for altered mental status, depression, hallucinations, memory loss, substance abuse, suicidal ideas and thoughts of violence. The patient does not have insomnia and is not nervous/anxious.    Allergic/Immunologic: Negative for hives and persistent infections.      Objective:       Vitals:    01/04/23 1304   BP: (!) 126/59   Pulse: 91   SpO2: 96%   Weight: (!) 139.1 kg (306 lb 12.3 oz)   Height: 6' (1.829 m)    Physical Exam  Constitutional:        General: He is not in acute distress.     Appearance: He is well-developed. He is not diaphoretic.   HENT:      Head: Normocephalic and atraumatic.      Nose: Nose normal.   Eyes:      General: No scleral icterus.        Right eye: No discharge.      Conjunctiva/sclera: Conjunctivae normal.      Pupils: Pupils are equal, round, and reactive to light.   Neck:      Thyroid: No thyromegaly.      Vascular: No JVD.      Trachea: No tracheal deviation.   Cardiovascular:      Rate and Rhythm: Normal rate and regular rhythm.      Pulses:           Carotid pulses are 2+ on the right side and 2+ on the left side.       Radial pulses are 2+ on the right side and 2+ on the left side.        Dorsalis pedis pulses are 1+ on the right side and 1+ on the left side.        Posterior tibial pulses are 1+ on the right side and 1+ on the left side.      Heart sounds: Normal heart sounds. No murmur heard.    No friction rub. No gallop.   Pulmonary:      Effort: Pulmonary effort is normal. No respiratory distress.      Breath sounds: Normal breath sounds. No stridor. No wheezing or rales.   Chest:      Chest wall: No tenderness.   Abdominal:      General: Bowel sounds are normal. There is no distension.      Palpations: Abdomen is soft. There is no mass.      Tenderness: There is no abdominal tenderness. There is no guarding or rebound.   Musculoskeletal:         General: No tenderness. Normal range of motion.      Cervical back: Normal range of motion and neck supple.   Lymphadenopathy:      Cervical: No cervical adenopathy.   Skin:     General: Skin is warm and dry.      Coloration: Skin is not pale.      Findings: No erythema or rash.   Neurological:      Mental Status: He is alert and oriented to person, place, and time.      Cranial Nerves: No cranial nerve deficit.      Coordination: Coordination normal.   Psychiatric:         Behavior: Behavior normal.         Thought Content: Thought content normal.         Judgment: Judgment  normal.         Assessment:       1. HOOPER (dyspnea on exertion)    2. Class 3 severe obesity due to excess calories with serious comorbidity and body mass index (BMI) of 40.0 to 44.9 in adult    3. Aortic atherosclerosis    4. Primary hypertension    5. Pure hypercholesterolemia    6. Pulmonary emphysema, unspecified emphysema type    7. Type 2 diabetes mellitus without complication, without long-term current use of insulin    8. ANTONI (obstructive sleep apnea)    9. Abnormal electrocardiogram (ECG) (EKG)      Results for orders placed or performed in visit on 01/04/23   B-TYPE NATRIURETIC PEPTIDE   Result Value Ref Range    BNP 33 0 - 99 pg/mL         Current Outpatient Medications:     clobetasoL (TEMOVATE) 0.05 % external solution, , Disp: , Rfl:     clobetasol 0.05% (TEMOVATE) 0.05 % Oint, clobetasol 0.05 % topical ointment, Disp: , Rfl:     furosemide (LASIX) 20 MG tablet, Take 1 tablet (20 mg total) by mouth once daily., Disp: 90 tablet, Rfl: 3    gabapentin (NEURONTIN) 800 MG tablet, gabapentin 800 mg tablet, Disp: , Rfl:     irbesartan (AVAPRO) 150 MG tablet, irbesartan 150 mg tablet, Disp: 30 tablet, Rfl: 5    ketoconazole (NIZORAL) 2 % shampoo, , Disp: , Rfl:     metFORMIN (FORTAMET) 500 mg 24hr tablet, Take 1 tablet (500 mg total) by mouth 2 (two) times daily with meals. Can start once daily x 1 - 2 weeks then increase to twice daily if tolerated, Disp: 180 tablet, Rfl: 1    mometasone 0.1% (ELOCON) 0.1 % cream, , Disp: , Rfl:     QUEtiapine (SEROQUEL) 25 MG Tab, quetiapine 25 mg tablet, Disp: , Rfl:     rosuvastatin (CRESTOR) 20 MG tablet, rosuvastatin 20 mg tablet, Disp: , Rfl:     tadalafiL (CIALIS) 5 MG tablet, , Disp: , Rfl:      Lab Results   Component Value Date    WBC 6.76 12/22/2022    RBC 4.89 12/22/2022    HGB 13.1 (L) 12/22/2022    HCT 40.9 12/22/2022    MCV 84 12/22/2022    MCH 26.8 (L) 12/22/2022    MCHC 32.0 12/22/2022    RDW 15.0 (H) 12/22/2022     12/22/2022    MPV 9.5 12/22/2022     GRAN 4.9 12/22/2022    GRAN 72.0 12/22/2022    LYMPH 1.2 12/22/2022    LYMPH 17.8 (L) 12/22/2022    MONO 0.6 12/22/2022    MONO 8.1 12/22/2022    EOS 0.1 12/22/2022    BASO 0.04 12/22/2022    EOSINOPHIL 1.2 12/22/2022    BASOPHIL 0.6 12/22/2022        CMP  Lab Results   Component Value Date     12/22/2022    K 4.1 12/22/2022     12/22/2022    CO2 25 12/22/2022     (H) 12/22/2022    BUN 10 12/22/2022    CREATININE 1.1 12/22/2022    CALCIUM 8.9 12/22/2022    PROT 7.1 12/22/2022    ALBUMIN 3.7 12/22/2022    BILITOT 0.7 12/22/2022    ALKPHOS 81 12/22/2022    AST 32 12/22/2022    ALT 34 12/22/2022    ANIONGAP 11 12/22/2022        No results found for: LABBLOO, LABURIN, RESPIRATORYC, GSRESP         Results for orders placed or performed during the hospital encounter of 12/22/22   SCHEDULED EKG 12-LEAD (to Muse)    Collection Time: 12/22/22 10:18 AM    Narrative    Test Reason : R06.02    Vent. Rate : 071 BPM     Atrial Rate : 071 BPM     P-R Int : 168 ms          QRS Dur : 088 ms      QT Int : 414 ms       P-R-T Axes : 076 078 101 degrees     QTc Int : 449 ms    Normal sinus rhythm  Nonspecific T wave abnormality  Abnormal ECG  No previous ECGs available  Confirmed by Mackenzie Medrano MD ([id number]) on 12/22/2022 11:32:55 AM    Referred By: RAFAEL ALBRIGHT           Confirmed By:Mackenzie Medrano MD        Echo 12/22/2022 [id number] Final   X-Ray Chest PA And Lateral 12/22/2022 [id number] Final            Plan:       Problem List Items Addressed This Visit          Pulmonary    COPD (chronic obstructive pulmonary disease)     Moderate abnormality on PFTs.  He quit smoking.  Likely, contributing factor explaining shortness of breath with activity.            Cardiac/Vascular    Primary hypertension     His current therapy will be continued.  He takes Avapro 150 mg.  Blood pressure today 126/59 mm Hg.         Pure hypercholesterolemia     He will continue Crestor 20 mg.  Current LDL 73 mg %-mixed hyperlipidemia picture  with HDL 28 triglycerides 134 mg%.         HOOPER (dyspnea on exertion)     I told him it is likely a multifactorial problem.  We discussed chronic diastolic heart failure and its management.    A trial of Lasix 20 mg prescribed today.         Relevant Medications    furosemide (LASIX) 20 MG tablet    Other Relevant Orders    B-TYPE NATRIURETIC PEPTIDE (Completed)    Aortic atherosclerosis     Condition stable.         Abnormal electrocardiogram (ECG) (EKG)     Most recent ECG reviewed.  There are ST T wave abnormalities in the lateral leads.  Coronary artery disease and dyspnea as anginal equivalent is a possibility triggering dyspnea.     In the future a stress test non ambulatory type to be performed.            Endocrine    Type 2 diabetes mellitus without complication, without long-term current use of insulin     Diagnosed several years ago.  He is on metformin.         Class 3 severe obesity due to excess calories with serious comorbidity and body mass index (BMI) of 40.0 to 44.9 in adult     Weight loss encouraged.            Other    ANTONI (obstructive sleep apnea)     No treatment currently.                     A BNP was ordered, I suspect it will come back normal.  Education provided to the patient regarding diastolic heart failure and its management including consideration of Aldactone or Jardiance in the future.  For today, I advised few furosemide 20 mg daily.  I will see him back in a month.    I plan on performing a dobutamine stress echo once the pharmacologic agent is available which should be within the next month or 2.    Thank you for allowing me to participate in your patient's care.         Rene Garcia MD  01/05/2023   11:11 AM

## 2023-01-05 PROBLEM — R94.31 ABNORMAL ELECTROCARDIOGRAM (ECG) (EKG): Status: ACTIVE | Noted: 2023-01-05

## 2023-01-05 PROBLEM — I70.0 AORTIC ATHEROSCLEROSIS: Status: ACTIVE | Noted: 2023-01-05

## 2023-01-05 LAB
HCV RNA SERPL QL NAA+PROBE: NOT DETECTED
HCV RNA SPEC NAA+PROBE-ACNC: <12 IU/ML

## 2023-01-05 NOTE — ASSESSMENT & PLAN NOTE
He will continue Crestor 20 mg.  Current LDL 73 mg %-mixed hyperlipidemia picture with HDL 28 triglycerides 134 mg%.

## 2023-01-05 NOTE — ASSESSMENT & PLAN NOTE
His current therapy will be continued.  He takes Avapro 150 mg.  Blood pressure today 126/59 mm Hg.

## 2023-01-05 NOTE — ASSESSMENT & PLAN NOTE
I told him it is likely a multifactorial problem.  We discussed chronic diastolic heart failure and its management.    A trial of Lasix 20 mg prescribed today.

## 2023-01-05 NOTE — ASSESSMENT & PLAN NOTE
Most recent ECG reviewed.  There are ST T wave abnormalities in the lateral leads.  Coronary artery disease and dyspnea as anginal equivalent is a possibility triggering dyspnea.     In the future a stress test non ambulatory type to be performed.

## 2023-01-05 NOTE — ASSESSMENT & PLAN NOTE
Moderate abnormality on PFTs.  He quit smoking.  Likely, contributing factor explaining shortness of breath with activity.

## 2023-01-06 ENCOUNTER — HOSPITAL ENCOUNTER (OUTPATIENT)
Dept: RADIOLOGY | Facility: HOSPITAL | Age: 68
Discharge: HOME | End: 2023-01-06
Attending: NURSE PRACTITIONER
Payer: MEDICARE

## 2023-01-06 DIAGNOSIS — I10 PRIMARY HYPERTENSION: ICD-10-CM

## 2023-01-06 DIAGNOSIS — Z87.891 FORMER CIGARETTE SMOKER: ICD-10-CM

## 2023-01-06 PROCEDURE — 76706 US ABDL AORTA SCREEN AAA: CPT | Mod: TC,HCNC

## 2023-01-09 ENCOUNTER — PATIENT MESSAGE (OUTPATIENT)
Dept: ADMINISTRATIVE | Facility: HOSPITAL | Age: 68
End: 2023-01-09
Payer: MEDICARE

## 2023-01-10 ENCOUNTER — PATIENT OUTREACH (OUTPATIENT)
Dept: ADMINISTRATIVE | Facility: HOSPITAL | Age: 68
End: 2023-01-10
Payer: MEDICARE

## 2023-01-10 NOTE — LETTER
AUTHORIZATION FOR RELEASE OF   CONFIDENTIAL INFORMATION      We are seeing Javad Coughlin, date of birth 1955, in the clinic at [facility]. Jeimy Treviño NP is the patient's PCP. Javad Coughlin has an outstanding lab/procedure at the time we reviewed his chart. In order to help keep his health information updated, he has authorized us to request the following medical record(s):        (  )  MAMMOGRAM                                      ( XX )  COLONOSCOPY      (  )  PAP SMEAR                                          (  )  OUTSIDE LAB RESULTS     (  )  DEXA SCAN                                          (  )  EYE EXAM            (  )  FOOT EXAM                                          (  )  ENTIRE RECORD     (  )  OUTSIDE IMMUNIZATIONS                 (  )  _______________         Please fax records to [facility], Melissa G Marcombe, NP, [phone]      Patient Name: Javad Coughlin  : 1955  Patient Phone #: [phone]

## 2023-01-10 NOTE — PROGRESS NOTES
Offered eye camera screen but pt stated he needed glasses. Gave names of several eye drs to schedule appt.  He will call after appt is scheduled to give the name of eye dr.  Josselin sent for colonoscopy

## 2023-01-17 ENCOUNTER — OFFICE VISIT (OUTPATIENT)
Dept: INTERNAL MEDICINE | Facility: CLINIC | Age: 68
End: 2023-01-17
Payer: MEDICARE

## 2023-01-17 VITALS
HEIGHT: 72 IN | BODY MASS INDEX: 41.21 KG/M2 | HEART RATE: 70 BPM | RESPIRATION RATE: 16 BRPM | DIASTOLIC BLOOD PRESSURE: 80 MMHG | OXYGEN SATURATION: 94 % | WEIGHT: 304.25 LBS | SYSTOLIC BLOOD PRESSURE: 152 MMHG

## 2023-01-17 DIAGNOSIS — J41.1 MUCOPURULENT CHRONIC BRONCHITIS: ICD-10-CM

## 2023-01-17 DIAGNOSIS — E11.9 TYPE 2 DIABETES MELLITUS WITHOUT COMPLICATION, WITHOUT LONG-TERM CURRENT USE OF INSULIN: ICD-10-CM

## 2023-01-17 DIAGNOSIS — E11.628 DIABETIC DERMOPATHY: ICD-10-CM

## 2023-01-17 DIAGNOSIS — I10 PRIMARY HYPERTENSION: Primary | ICD-10-CM

## 2023-01-17 DIAGNOSIS — L98.8 DIABETIC DERMOPATHY: ICD-10-CM

## 2023-01-17 DIAGNOSIS — L40.0 PSORIASIS VULGARIS: ICD-10-CM

## 2023-01-17 DIAGNOSIS — G57.93 NEUROPATHY OF BOTH FEET: ICD-10-CM

## 2023-01-17 DIAGNOSIS — Z12.5 SCREENING FOR PROSTATE CANCER: ICD-10-CM

## 2023-01-17 DIAGNOSIS — R76.8 HEPATITIS C ANTIBODY POSITIVE IN BLOOD: ICD-10-CM

## 2023-01-17 DIAGNOSIS — I70.0 AORTIC ATHEROSCLEROSIS: ICD-10-CM

## 2023-01-17 PROCEDURE — 99499 RISK ADDL DX/OHS AUDIT: ICD-10-PCS | Mod: S$GLB,,, | Performed by: NURSE PRACTITIONER

## 2023-01-17 PROCEDURE — 99214 OFFICE O/P EST MOD 30 MIN: CPT | Mod: HCNC,S$GLB,, | Performed by: NURSE PRACTITIONER

## 2023-01-17 PROCEDURE — 99214 PR OFFICE/OUTPT VISIT, EST, LEVL IV, 30-39 MIN: ICD-10-PCS | Mod: HCNC,S$GLB,, | Performed by: NURSE PRACTITIONER

## 2023-01-17 PROCEDURE — 1126F PR PAIN SEVERITY QUANTIFIED, NO PAIN PRESENT: ICD-10-PCS | Mod: HCNC,CPTII,S$GLB, | Performed by: NURSE PRACTITIONER

## 2023-01-17 PROCEDURE — 3077F SYST BP >= 140 MM HG: CPT | Mod: HCNC,CPTII,S$GLB, | Performed by: NURSE PRACTITIONER

## 2023-01-17 PROCEDURE — 99999 PR PBB SHADOW E&M-EST. PATIENT-LVL V: ICD-10-PCS | Mod: PBBFAC,HCNC,, | Performed by: NURSE PRACTITIONER

## 2023-01-17 PROCEDURE — 3079F PR MOST RECENT DIASTOLIC BLOOD PRESSURE 80-89 MM HG: ICD-10-PCS | Mod: HCNC,CPTII,S$GLB, | Performed by: NURSE PRACTITIONER

## 2023-01-17 PROCEDURE — 1159F PR MEDICATION LIST DOCUMENTED IN MEDICAL RECORD: ICD-10-PCS | Mod: HCNC,CPTII,S$GLB, | Performed by: NURSE PRACTITIONER

## 2023-01-17 PROCEDURE — 3008F PR BODY MASS INDEX (BMI) DOCUMENTED: ICD-10-PCS | Mod: HCNC,CPTII,S$GLB, | Performed by: NURSE PRACTITIONER

## 2023-01-17 PROCEDURE — 99499 UNLISTED E&M SERVICE: CPT | Mod: S$GLB,,, | Performed by: NURSE PRACTITIONER

## 2023-01-17 PROCEDURE — 1126F AMNT PAIN NOTED NONE PRSNT: CPT | Mod: HCNC,CPTII,S$GLB, | Performed by: NURSE PRACTITIONER

## 2023-01-17 PROCEDURE — 99999 PR PBB SHADOW E&M-EST. PATIENT-LVL V: CPT | Mod: PBBFAC,HCNC,, | Performed by: NURSE PRACTITIONER

## 2023-01-17 PROCEDURE — 3288F PR FALLS RISK ASSESSMENT DOCUMENTED: ICD-10-PCS | Mod: HCNC,CPTII,S$GLB, | Performed by: NURSE PRACTITIONER

## 2023-01-17 PROCEDURE — 3288F FALL RISK ASSESSMENT DOCD: CPT | Mod: HCNC,CPTII,S$GLB, | Performed by: NURSE PRACTITIONER

## 2023-01-17 PROCEDURE — 1101F PT FALLS ASSESS-DOCD LE1/YR: CPT | Mod: HCNC,CPTII,S$GLB, | Performed by: NURSE PRACTITIONER

## 2023-01-17 PROCEDURE — 1159F MED LIST DOCD IN RCRD: CPT | Mod: HCNC,CPTII,S$GLB, | Performed by: NURSE PRACTITIONER

## 2023-01-17 PROCEDURE — 3077F PR MOST RECENT SYSTOLIC BLOOD PRESSURE >= 140 MM HG: ICD-10-PCS | Mod: HCNC,CPTII,S$GLB, | Performed by: NURSE PRACTITIONER

## 2023-01-17 PROCEDURE — 3079F DIAST BP 80-89 MM HG: CPT | Mod: HCNC,CPTII,S$GLB, | Performed by: NURSE PRACTITIONER

## 2023-01-17 PROCEDURE — 1101F PR PT FALLS ASSESS DOC 0-1 FALLS W/OUT INJ PAST YR: ICD-10-PCS | Mod: HCNC,CPTII,S$GLB, | Performed by: NURSE PRACTITIONER

## 2023-01-17 PROCEDURE — 3008F BODY MASS INDEX DOCD: CPT | Mod: HCNC,CPTII,S$GLB, | Performed by: NURSE PRACTITIONER

## 2023-01-17 RX ORDER — METFORMIN HYDROCHLORIDE 500 MG/1
500 TABLET ORAL
Qty: 90 TABLET | Refills: 1 | Status: SHIPPED | OUTPATIENT
Start: 2023-01-17 | End: 2023-03-30

## 2023-01-17 RX ORDER — ALBUTEROL SULFATE 90 UG/1
2 AEROSOL, METERED RESPIRATORY (INHALATION) EVERY 6 HOURS PRN
Qty: 18 G | Refills: 5 | Status: SHIPPED | OUTPATIENT
Start: 2023-01-17 | End: 2023-09-19 | Stop reason: SDUPTHER

## 2023-01-17 NOTE — PROGRESS NOTES
"Subjective:           Patient ID: Javad Coughlin is a 67 y.o. male.    Chief Complaint: Follow-up    Javad Coughlin is a 67 y.o. male, here for f/u , recently established    Recent labs, CXR and PFTs reviewed personally      Type 2 diabetes mellitus without complications  Dx 3 years ago ; now diet controlled  Losing weight   Notes he was 318 down to 301     Pure hypercholesterolemia, - takes rosuvastatin   Dx 3 yrs ago with DM2         Benign prostatic hyperplasia without lower urinary tract symptoms  Hyperspade -   No recent urology  Tadalafil for prostate      Essential (primary) hypertension;   Dx - 10yrs,  " off and on" ,  irbesartan - 150mg     GARCÍA- recent eval with cardiology - discussed   GARCÍA (dyspnea on exertion);  it is likely a multifactorial problem.  We discussed chronic diastolic heart failure and its management.   A trial of Lasix 20 mg prescribed today.    Abnormal electrocardiogram (ECG) (EKG) Per cardiology      Most recent ECG reviewed.  There are ST T wave abnormalities in the lateral leads.  Coronary artery disease and dyspnea as anginal equivalent is a possibility triggering dyspnea.      In the future a stress test non ambulatory type to be performed.    COPD-Notes Hx of emphysema, quits smoking years ago, 4 years      PFTs-   Moderate obstruction.   Airflow is improved after bronchodilator  Notes he does get bronchitis at least once a year  Wheezes at night; per wife; uses her inhalers at times   + mucous production   + Chronic García; will start albuterol discussed and refer to Pulmonologist   Consider CT, controller with comb;   Notes he was exposed to asbestosis with work; PARKER dx with mesothelioma   CXR- The lungs are clear, with normal appearance of pulmonary vasculature and no pleural effusion or pneumothorax.     The cardiac silhouette is normal in size.  There are aortic arch calcifications.  The hilar and mediastinal contours are unremarkable.         Obstructive sleep apnea " (adult)   Dx - 30 yrs ago ; used CPAP for a while then it got old       Gabapentin for neuropathy- bilateral feet ; Dx 4-5 years   800mg tid- takes 1 in am and 1.5 at night   Benign prostatic hyperplasia without lower urinary tract symptoms  Hyperspade -   No recent urology  Tadalafil for prostate     Quetipaine for insomnia       Notes eczema, Plque psoriasis' going this PM for injeciton= tremfya   Dr. Irwin Braun    Brown annular hyper-pigmented pretibial patches noted - has had this for years     Notes emphysema, quits smoking years ago, 4 years      Obesity -   Wt Readings from Last 3 Encounters:  23 0958 : (!) 138 kg (304 lb 3.8 oz)  23 1304 : (!) 139.1 kg (306 lb 12.3 oz)  22 0854 : (!) 136.9 kg (301 lb 13 oz)      Social ; , youngest of 12 children;   parents  in their 90s, no premature heart hx     Health Maintenance:  -CRC:colonoscopy with Dr. SONAL Mark last year -20 - repeat in 5 years; 2025   Polyps removed - Pathology tubulr adenoma , hyperplastic polyp   -PSA- check w next labs      -Bone Density  -AAA- due + Ex smoker   -tobacco- quit 4 yrs ago, smoked 1-2ppd since age 11   -Vaccines- due for Tdap, PCV - give today       Review of Systems   Constitutional: Negative.    HENT:  Negative for congestion, ear pain, sinus pressure, sneezing and sore throat.    Eyes: Negative.    Respiratory:  Positive for shortness of breath (amador) and wheezing (at night). Negative for cough and chest tightness.    Cardiovascular: Negative.  Negative for chest pain.   Gastrointestinal:  Negative for abdominal pain, blood in stool, constipation, diarrhea, nausea and vomiting.   Genitourinary:  Negative for difficulty urinating, dysuria and flank pain.   Musculoskeletal: Negative.  Negative for joint swelling.   Skin:  Positive for rash.   Neurological:  Negative for dizziness, weakness and headaches.   Hematological: Negative.    Psychiatric/Behavioral: Negative.  Negative for behavioral  problems and confusion.      Objective:      Physical Exam  Vitals and nursing note reviewed.   Constitutional:       General: He is not in acute distress.     Appearance: He is well-developed. He is obese. He is not ill-appearing, toxic-appearing or diaphoretic.   HENT:      Head: Normocephalic and atraumatic.      Nose: Nose normal.   Eyes:      Pupils: Pupils are equal, round, and reactive to light.   Cardiovascular:      Rate and Rhythm: Normal rate and regular rhythm.      Heart sounds: No murmur heard.  Pulmonary:      Effort: Pulmonary effort is normal. No respiratory distress.      Breath sounds: Normal breath sounds. No stridor. No wheezing or rhonchi.      Comments: Diminished   Abdominal:      General: Bowel sounds are normal.      Palpations: Abdomen is soft.   Musculoskeletal:         General: No swelling, tenderness, deformity or signs of injury. Normal range of motion.      Cervical back: Normal range of motion and neck supple.      Right lower leg: No edema.      Left lower leg: No edema.   Skin:     General: Skin is warm and dry.      Capillary Refill: Capillary refill takes less than 2 seconds.      Findings: Rash (face, forhead) present.      Comments: Bilateral Pretibial hyperpigmented rash - DM dermatitis    Neurological:      Mental Status: He is alert and oriented to person, place, and time.   Psychiatric:         Behavior: Behavior normal.         Thought Content: Thought content normal.         Judgment: Judgment normal.     Protective Sensation (w/ 10 gram monofilament):  Right: Intact  Left: Intact    Visual Inspection:  Nails Intact - without Evidence of Foot Deformity- Bilateral    Pedal Pulses:   Right: Present  Left: Present    Posterior tibialis:   Right:Present  Left: Present      Assessment:       1. Primary hypertension    2. Type 2 diabetes mellitus without complication, without long-term current use of insulin    3. Neuropathy of both feet    4. Psoriasis vulgaris    5. Aortic  "atherosclerosis    6. Mucopurulent chronic bronchitis    7. Screening for prostate cancer    8. Diabetic dermopathy    9. 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 Primary hypertension  Overview:  12/21/22 Dx approx 10 yrs ago   Dx - 10yrs,  " off and on" , irbesartan - 150mg Rx      2. Type 2 diabetes mellitus without complication, without long-term current use of insulin  Overview:  12/21/22 Prior Dx 3 yrs ago; tx with Ozempic- was getting samples   Type 2 diabetes mellitus without complications  Reportedly diet  Controlled; not on Rx   Losing weight   Notes he was 318 down to 301  12/22/22 urine MA normal     Assessment & Plan:  Lab Results   Component Value Date    HGBA1C 6.7 (H) 12/22/2022         Orders:  -     metFORMIN (GLUCOPHAGE) 500 MG tablet; Take 1 tablet (500 mg total) by mouth daily with breakfast.  Dispense: 90 tablet; Refill: 1  -     Comprehensive Metabolic Panel; Future; Expected date: 01/17/2023  -     Lipid Panel; Future; Expected date: 01/17/2023  -     Hemoglobin A1C; Future; Expected date: 01/17/2023    3. Neuropathy of both feet  Overview:  1/23 Gabapentin for neuropathy- bilateral feet ; dx 4- 5 yrs ago   800mg tid- takes 1 in am and 1.5 at night       4. Psoriasis vulgaris  Overview:  eczema, Plque psoriasis; getting injeciton= tremfya     Follows with Dermatologist, Dr. Braun         5. Aortic atherosclerosis  Overview:  12/22/22 CXR- aortic arch calcifications    Assessment & Plan:  Stable with crestor 20mg; LDL 73.2 on 12/23/22         6. Mucopurulent chronic bronchitis  Overview:  Notes emphysema, quits smoking years ago, 4 years   Had PFTs years ago   Repeat PFTs- 12/23/22 showing Moderate obstruction.      Orders:  -     Ambulatory referral/consult to Pulmonology; Future; Expected date: 01/24/2023  -     albuterol (PROVENTIL/VENTOLIN HFA) 90 mcg/actuation inhaler; Inhale 2 puffs into the lungs every 6 (six) hours as needed for Wheezing or Shortness of Breath (cough " and wheezing).  Dispense: 18 g; Refill: 5    7. Screening for prostate cancer  -     PSA, Screening; Future; Expected date: 07/17/2023    8. Diabetic dermopathy  Overview:  Chronic LE round brown       9. Hepatitis C antibody positive in blood  Overview:  Known Hep C antibody positive   Hep C RNA negative 1/4/23

## 2023-01-25 ENCOUNTER — TELEPHONE (OUTPATIENT)
Dept: PULMONOLOGY | Facility: CLINIC | Age: 68
End: 2023-01-25
Payer: MEDICARE

## 2023-02-07 DIAGNOSIS — Z00.00 ENCOUNTER FOR MEDICARE ANNUAL WELLNESS EXAM: ICD-10-CM

## 2023-02-08 ENCOUNTER — OFFICE VISIT (OUTPATIENT)
Dept: CARDIOLOGY | Facility: CLINIC | Age: 68
End: 2023-02-08
Payer: MEDICARE

## 2023-02-08 VITALS
BODY MASS INDEX: 41.57 KG/M2 | WEIGHT: 306.88 LBS | RESPIRATION RATE: 20 BRPM | SYSTOLIC BLOOD PRESSURE: 135 MMHG | HEIGHT: 72 IN | DIASTOLIC BLOOD PRESSURE: 63 MMHG | HEART RATE: 84 BPM

## 2023-02-08 DIAGNOSIS — I70.0 AORTIC ATHEROSCLEROSIS: ICD-10-CM

## 2023-02-08 DIAGNOSIS — J43.9 PULMONARY EMPHYSEMA, UNSPECIFIED EMPHYSEMA TYPE: ICD-10-CM

## 2023-02-08 DIAGNOSIS — I10 PRIMARY HYPERTENSION: Primary | ICD-10-CM

## 2023-02-08 DIAGNOSIS — E11.9 TYPE 2 DIABETES MELLITUS WITHOUT COMPLICATION, WITHOUT LONG-TERM CURRENT USE OF INSULIN: ICD-10-CM

## 2023-02-08 DIAGNOSIS — E66.01 CLASS 3 SEVERE OBESITY DUE TO EXCESS CALORIES WITH SERIOUS COMORBIDITY AND BODY MASS INDEX (BMI) OF 40.0 TO 44.9 IN ADULT: ICD-10-CM

## 2023-02-08 DIAGNOSIS — E78.00 PURE HYPERCHOLESTEROLEMIA: ICD-10-CM

## 2023-02-08 PROCEDURE — 1101F PR PT FALLS ASSESS DOC 0-1 FALLS W/OUT INJ PAST YR: ICD-10-PCS | Mod: HCNC,CPTII,S$GLB, | Performed by: INTERNAL MEDICINE

## 2023-02-08 PROCEDURE — 3078F DIAST BP <80 MM HG: CPT | Mod: HCNC,CPTII,S$GLB, | Performed by: INTERNAL MEDICINE

## 2023-02-08 PROCEDURE — 3288F FALL RISK ASSESSMENT DOCD: CPT | Mod: HCNC,CPTII,S$GLB, | Performed by: INTERNAL MEDICINE

## 2023-02-08 PROCEDURE — 99214 OFFICE O/P EST MOD 30 MIN: CPT | Mod: HCNC,S$GLB,, | Performed by: INTERNAL MEDICINE

## 2023-02-08 PROCEDURE — 1159F MED LIST DOCD IN RCRD: CPT | Mod: HCNC,CPTII,S$GLB, | Performed by: INTERNAL MEDICINE

## 2023-02-08 PROCEDURE — 3288F PR FALLS RISK ASSESSMENT DOCUMENTED: ICD-10-PCS | Mod: HCNC,CPTII,S$GLB, | Performed by: INTERNAL MEDICINE

## 2023-02-08 PROCEDURE — 1160F RVW MEDS BY RX/DR IN RCRD: CPT | Mod: HCNC,CPTII,S$GLB, | Performed by: INTERNAL MEDICINE

## 2023-02-08 PROCEDURE — 1159F PR MEDICATION LIST DOCUMENTED IN MEDICAL RECORD: ICD-10-PCS | Mod: HCNC,CPTII,S$GLB, | Performed by: INTERNAL MEDICINE

## 2023-02-08 PROCEDURE — 3075F SYST BP GE 130 - 139MM HG: CPT | Mod: HCNC,CPTII,S$GLB, | Performed by: INTERNAL MEDICINE

## 2023-02-08 PROCEDURE — 99999 PR PBB SHADOW E&M-EST. PATIENT-LVL II: ICD-10-PCS | Mod: PBBFAC,HCNC,, | Performed by: INTERNAL MEDICINE

## 2023-02-08 PROCEDURE — 3078F PR MOST RECENT DIASTOLIC BLOOD PRESSURE < 80 MM HG: ICD-10-PCS | Mod: HCNC,CPTII,S$GLB, | Performed by: INTERNAL MEDICINE

## 2023-02-08 PROCEDURE — 99214 PR OFFICE/OUTPT VISIT, EST, LEVL IV, 30-39 MIN: ICD-10-PCS | Mod: HCNC,S$GLB,, | Performed by: INTERNAL MEDICINE

## 2023-02-08 PROCEDURE — 4010F PR ACE/ARB THEARPY RXD/TAKEN: ICD-10-PCS | Mod: HCNC,CPTII,S$GLB, | Performed by: INTERNAL MEDICINE

## 2023-02-08 PROCEDURE — 4010F ACE/ARB THERAPY RXD/TAKEN: CPT | Mod: HCNC,CPTII,S$GLB, | Performed by: INTERNAL MEDICINE

## 2023-02-08 PROCEDURE — 3075F PR MOST RECENT SYSTOLIC BLOOD PRESS GE 130-139MM HG: ICD-10-PCS | Mod: HCNC,CPTII,S$GLB, | Performed by: INTERNAL MEDICINE

## 2023-02-08 PROCEDURE — 1101F PT FALLS ASSESS-DOCD LE1/YR: CPT | Mod: HCNC,CPTII,S$GLB, | Performed by: INTERNAL MEDICINE

## 2023-02-08 PROCEDURE — 3008F BODY MASS INDEX DOCD: CPT | Mod: HCNC,CPTII,S$GLB, | Performed by: INTERNAL MEDICINE

## 2023-02-08 PROCEDURE — 3008F PR BODY MASS INDEX (BMI) DOCUMENTED: ICD-10-PCS | Mod: HCNC,CPTII,S$GLB, | Performed by: INTERNAL MEDICINE

## 2023-02-08 PROCEDURE — 1160F PR REVIEW ALL MEDS BY PRESCRIBER/CLIN PHARMACIST DOCUMENTED: ICD-10-PCS | Mod: HCNC,CPTII,S$GLB, | Performed by: INTERNAL MEDICINE

## 2023-02-08 PROCEDURE — 99999 PR PBB SHADOW E&M-EST. PATIENT-LVL II: CPT | Mod: PBBFAC,HCNC,, | Performed by: INTERNAL MEDICINE

## 2023-02-08 RX ORDER — SPIRONOLACTONE 25 MG/1
25 TABLET ORAL DAILY
Qty: 90 TABLET | Refills: 3 | Status: SHIPPED | OUTPATIENT
Start: 2023-02-08 | End: 2023-06-20 | Stop reason: SDUPTHER

## 2023-02-08 NOTE — ASSESSMENT & PLAN NOTE
Aldactone 25 mg added to ear losartan for better blood pressure control an attempt at improvement in shortness of breath.  There could be a component of diastolic heart failure.

## 2023-02-08 NOTE — PROGRESS NOTES
[facility] Cardiology     Subjective:    Patient ID:  Javad Coughlin is a 67 y.o. male who presents for follow-up of Hypertension, Hyperlipidemia, Diabetes Mellitus, and Shortness of Breath    Review of patient's allergies indicates:  No Known Allergies   He has been taking furosemide 20 mg for clinical assessment of diuretic therapy to improve his shortness of breath.  I ordered a BNP which came back normal on day of blood draw when I last saw him.  He states that it made him thirsty and he urinated more but he did not have improvement in his breathing.  He has limitations due to shortness of breath as well as back pain.  He can walk to his mailbox but has to stop because of shortness of breath.  [place] [facility] [place]    He was started on diabetic therapy.  He is on statin therapy with LDL 73 mg%.  He takes metformin.  He is on Avapro for hypertension.  His blood pressure readings have been on the high side but not extremely elevated.  His serum potassium on last blood draw was 4.1 with normal GFR.  He has been gaining weight.  His breathing has gotten worse as he gained weight.          Review of Systems   Constitutional: Positive for weight gain. Negative for chills, decreased appetite, diaphoresis, fever, malaise/fatigue and night sweats.   HENT:  Negative for congestion, ear discharge, ear pain, hearing loss, hoarse voice, nosebleeds, odynophagia, sore throat, stridor and tinnitus.    Eyes:  Negative for blurred vision, discharge, double vision, pain, photophobia, redness, vision loss in left eye, vision loss in right eye, visual disturbance and visual halos.   Cardiovascular:  Positive for dyspnea on exertion. Negative for chest pain, claudication, cyanosis, irregular heartbeat, leg swelling, near-syncope, orthopnea, palpitations, paroxysmal nocturnal dyspnea and syncope.   Respiratory:  Positive  for shortness of breath. Negative for cough, hemoptysis, sleep disturbances due to breathing, snoring, sputum production and wheezing.    Endocrine: Negative for cold intolerance, heat intolerance, polydipsia, polyphagia and polyuria.   Hematologic/Lymphatic: Negative for adenopathy and bleeding problem. Does not bruise/bleed easily.   Skin:  Negative for color change, dry skin, flushing, itching, nail changes, poor wound healing, rash, skin cancer, suspicious lesions and unusual hair distribution.   Musculoskeletal:  Positive for back pain. Negative for arthritis, falls, gout, joint pain, joint swelling, muscle cramps, muscle weakness, myalgias, neck pain and stiffness.   Gastrointestinal:  Negative for bloating, abdominal pain, anorexia, change in bowel habit, bowel incontinence, constipation, diarrhea, dysphagia, excessive appetite, flatus, heartburn, hematemesis, hematochezia, hemorrhoids, jaundice, melena, nausea and vomiting.   Genitourinary:  Negative for bladder incontinence, decreased libido, dysuria, flank pain, frequency, genital sores, hematuria, hesitancy, incomplete emptying, nocturia and urgency.   Neurological:  Negative for aphonia, brief paralysis, difficulty with concentration, disturbances in coordination, excessive daytime sleepiness, dizziness, focal weakness, headaches, light-headedness, loss of balance, numbness, paresthesias, seizures, sensory change, tremors, vertigo and weakness.   Psychiatric/Behavioral:  Negative for altered mental status, depression, hallucinations, memory loss, substance abuse, suicidal ideas and thoughts of violence. The patient does not have insomnia and is not nervous/anxious.    Allergic/Immunologic: Negative for hives and persistent infections.      Objective:       Vitals:    02/08/23 1023   BP: 135/63   Pulse: 84   Resp: 20   Weight: (!) 139.2 kg (306 lb 14.1 oz)   Height: 6' (1.829 m)    Physical Exam  Constitutional:       General: He is not in acute  distress.     Appearance: He is well-developed. He is not diaphoretic.   HENT:      Head: Normocephalic and atraumatic.      Nose: Nose normal.   Eyes:      General: No scleral icterus.        Right eye: No discharge.      Conjunctiva/sclera: Conjunctivae normal.      Pupils: Pupils are equal, round, and reactive to light.   Neck:      Thyroid: No thyromegaly.      Vascular: No JVD.      Trachea: No tracheal deviation.   Cardiovascular:      Rate and Rhythm: Normal rate and regular rhythm.      Pulses:           Carotid pulses are 2+ on the right side and 2+ on the left side.       Radial pulses are 2+ on the right side and 2+ on the left side.        Dorsalis pedis pulses are 1+ on the right side and 1+ on the left side.        Posterior tibial pulses are 1+ on the right side and 1+ on the left side.      Heart sounds: Normal heart sounds. No murmur heard.    No friction rub. No gallop.   Pulmonary:      Effort: Pulmonary effort is normal. No respiratory distress.      Breath sounds: Normal breath sounds. No stridor. No wheezing or rales.   Chest:      Chest wall: No tenderness.   Abdominal:      General: Bowel sounds are normal. There is no distension.      Palpations: Abdomen is soft. There is no mass.      Tenderness: There is no abdominal tenderness. There is no guarding or rebound.   Musculoskeletal:         General: No tenderness. Normal range of motion.      Cervical back: Normal range of motion and neck supple.   Lymphadenopathy:      Cervical: No cervical adenopathy.   Skin:     General: Skin is warm and dry.      Coloration: Skin is not pale.      Findings: No erythema or rash.   Neurological:      Mental Status: He is alert and oriented to person, place, and time.      Cranial Nerves: No cranial nerve deficit.      Coordination: Coordination normal.   Psychiatric:         Behavior: Behavior normal.         Thought Content: Thought content normal.         Judgment: Judgment normal.         Assessment:        1. Primary hypertension    2. Pure hypercholesterolemia    3. Pulmonary emphysema, unspecified emphysema type    4. Aortic atherosclerosis    5. Type 2 diabetes mellitus without complication, without long-term current use of insulin    6. Class 3 severe obesity due to excess calories with serious comorbidity and body mass index (BMI) of 40.0 to 44.9 in adult      Results for orders placed or performed in visit on 01/04/23   B-TYPE NATRIURETIC PEPTIDE   Result Value Ref Range    BNP 33 0 - 99 pg/mL   HEPATITIS C RNA, QUANTITATIVE, PCR   Result Value Ref Range    HCV Quantitative Result <12 <12 IU/mL    HCV, Qualitative Not Detected Not Detected         Current Outpatient Medications:     albuterol (PROVENTIL/VENTOLIN HFA) 90 mcg/actuation inhaler, Inhale 2 puffs into the lungs every 6 (six) hours as needed for Wheezing or Shortness of Breath (cough and wheezing)., Disp: 18 g, Rfl: 5    clobetasoL (TEMOVATE) 0.05 % external solution, , Disp: , Rfl:     clobetasol 0.05% (TEMOVATE) 0.05 % Oint, clobetasol 0.05 % topical ointment, Disp: , Rfl:     gabapentin (NEURONTIN) 800 MG tablet, Take 800 mg by mouth 2 (two) times daily., Disp: , Rfl:     irbesartan (AVAPRO) 150 MG tablet, irbesartan 150 mg tablet (Patient taking differently: Take 150 mg by mouth once daily. irbesartan 150 mg tablet), Disp: 30 tablet, Rfl: 5    ketoconazole (NIZORAL) 2 % shampoo, , Disp: , Rfl:     metFORMIN (GLUCOPHAGE) 500 MG tablet, Take 1 tablet (500 mg total) by mouth daily with breakfast., Disp: 90 tablet, Rfl: 1    mometasone 0.1% (ELOCON) 0.1 % cream, , Disp: , Rfl:     rosuvastatin (CRESTOR) 20 MG tablet, rosuvastatin 20 mg tablet, Disp: , Rfl:     tadalafiL (CIALIS) 5 MG tablet, , Disp: , Rfl:     QUEtiapine (SEROQUEL) 25 MG Tab, Take 25 mg by mouth nightly as needed., Disp: , Rfl:     spironolactone (ALDACTONE) 25 MG tablet, Take 1 tablet (25 mg total) by mouth once daily., Disp: 90 tablet, Rfl: 3     Lab Results   Component Value Date     WBC 6.76 12/22/2022    RBC 4.89 12/22/2022    HGB 13.1 (L) 12/22/2022    HCT 40.9 12/22/2022    MCV 84 12/22/2022    MCH 26.8 (L) 12/22/2022    MCHC 32.0 12/22/2022    RDW 15.0 (H) 12/22/2022     12/22/2022    MPV 9.5 12/22/2022    GRAN 4.9 12/22/2022    GRAN 72.0 12/22/2022    LYMPH 1.2 12/22/2022    LYMPH 17.8 (L) 12/22/2022    MONO 0.6 12/22/2022    MONO 8.1 12/22/2022    EOS 0.1 12/22/2022    BASO 0.04 12/22/2022    EOSINOPHIL 1.2 12/22/2022    BASOPHIL 0.6 12/22/2022        CMP  Lab Results   Component Value Date     12/22/2022    K 4.1 12/22/2022     12/22/2022    CO2 25 12/22/2022     (H) 12/22/2022    BUN 10 12/22/2022    CREATININE 1.1 12/22/2022    CALCIUM 8.9 12/22/2022    PROT 7.1 12/22/2022    ALBUMIN 3.7 12/22/2022    BILITOT 0.7 12/22/2022    ALKPHOS 81 12/22/2022    AST 32 12/22/2022    ALT 34 12/22/2022    ANIONGAP 11 12/22/2022        No results found for: LABBLOO, LABURIN, RESPIRATORYC, GSRESP         Results for orders placed or performed during the hospital encounter of 12/22/22   SCHEDULED EKG 12-LEAD (to Muse)    Collection Time: 12/22/22 10:18 AM    Narrative    Test Reason : R06.02    Vent. Rate : 071 BPM     Atrial Rate : 071 BPM     P-R Int : 168 ms          QRS Dur : 088 ms      QT Int : 414 ms       P-R-T Axes : 076 078 101 degrees     QTc Int : 449 ms    Normal sinus rhythm  Nonspecific T wave abnormality  Abnormal ECG  No previous ECGs available  Confirmed by Mackenzie Medrano MD ([id number]) on 12/22/2022 11:32:55 AM    Referred By: RAFAEL ALBRIGHT           Confirmed By:Mackenzie Medrano MD                  Plan:       Problem List Items Addressed This Visit          Pulmonary    COPD (chronic obstructive pulmonary disease)     He has upcoming pulmonary evaluation.  Hopefully it will improve his breathing.            Cardiac/Vascular    Primary hypertension - Primary     Aldactone 25 mg added to ear losartan for better blood pressure control an attempt at improvement  in shortness of breath.  There could be a component of diastolic heart failure.         Relevant Medications    spironolactone (ALDACTONE) 25 MG tablet    Other Relevant Orders    Basic metabolic panel    Pure hypercholesterolemia     Condition controlled.  He will continue his rosuvastatin 20 mg medication.         Aortic atherosclerosis     Condition unchanged.            Endocrine    Type 2 diabetes mellitus without complication, without long-term current use of insulin     Metformin initiated.  Most recent A1c 6.7.         Class 3 severe obesity due to excess calories with serious comorbidity and body mass index (BMI) of 40.0 to 44.9 in adult     Weight loss encouraged.             Furosemide discontinued today, Aldactone 25 mg ordered today.  Follow-up labs advised in 10-14 days after starting Aldactone.    I made a four-month follow-up visit.  He has never had a stress test but I am of low suspicion that his shortness of breath symptoms are related to coronary artery disease.  I do plan on assessing the patient for coronary artery disease in the future.               Rene Garcia MD  02/08/2023   11:13 AM

## 2023-02-09 ENCOUNTER — TELEPHONE (OUTPATIENT)
Dept: INTERNAL MEDICINE | Facility: CLINIC | Age: 68
End: 2023-02-09
Payer: MEDICARE

## 2023-02-09 DIAGNOSIS — Z00.00 ENCOUNTER FOR MEDICARE ANNUAL WELLNESS EXAM: ICD-10-CM

## 2023-02-09 NOTE — TELEPHONE ENCOUNTER
----- Message from Aisha Reilly sent at 2023  3:22 PM CST -----  Contact: Sarah Joseph  Javad Coughlin  MRN: [id number]  : 1955  PCP: Jeimy Treviño  Home Phone      Not on file.  Work Phone      Not on file.  Mobile          [phone]      MESSAGE: Sarah Joseph states they need clinical information for the pt because the status of the PA is pending.  Ref # [id number]    Fax [phone]    Any questions call [phone]

## 2023-02-10 NOTE — TELEPHONE ENCOUNTER
Need clarification on this message. Not sure what the PA is for. Attempted to contact [employer], but was placed on hold and no one picked up.

## 2023-02-13 NOTE — TELEPHONE ENCOUNTER
Attempted to return call to [facility], but the call was answered by an automated system. Unable to reach a representative

## 2023-02-14 ENCOUNTER — OFFICE VISIT (OUTPATIENT)
Dept: PULMONOLOGY | Facility: CLINIC | Age: 68
End: 2023-02-14
Payer: MEDICARE

## 2023-02-14 VITALS
WEIGHT: 305.31 LBS | OXYGEN SATURATION: 95 % | HEIGHT: 72 IN | DIASTOLIC BLOOD PRESSURE: 67 MMHG | SYSTOLIC BLOOD PRESSURE: 130 MMHG | BODY MASS INDEX: 41.35 KG/M2 | HEART RATE: 85 BPM

## 2023-02-14 DIAGNOSIS — R05.9 COUGH, UNSPECIFIED TYPE: ICD-10-CM

## 2023-02-14 DIAGNOSIS — J44.9 CHRONIC OBSTRUCTIVE PULMONARY DISEASE, UNSPECIFIED COPD TYPE: Primary | ICD-10-CM

## 2023-02-14 PROCEDURE — 1101F PT FALLS ASSESS-DOCD LE1/YR: CPT | Mod: HCNC,CPTII,S$GLB, | Performed by: INTERNAL MEDICINE

## 2023-02-14 PROCEDURE — 3288F PR FALLS RISK ASSESSMENT DOCUMENTED: ICD-10-PCS | Mod: HCNC,CPTII,S$GLB, | Performed by: INTERNAL MEDICINE

## 2023-02-14 PROCEDURE — 3078F PR MOST RECENT DIASTOLIC BLOOD PRESSURE < 80 MM HG: ICD-10-PCS | Mod: HCNC,CPTII,S$GLB, | Performed by: INTERNAL MEDICINE

## 2023-02-14 PROCEDURE — 4010F ACE/ARB THERAPY RXD/TAKEN: CPT | Mod: HCNC,CPTII,S$GLB, | Performed by: INTERNAL MEDICINE

## 2023-02-14 PROCEDURE — 1159F PR MEDICATION LIST DOCUMENTED IN MEDICAL RECORD: ICD-10-PCS | Mod: HCNC,CPTII,S$GLB, | Performed by: INTERNAL MEDICINE

## 2023-02-14 PROCEDURE — 3288F FALL RISK ASSESSMENT DOCD: CPT | Mod: HCNC,CPTII,S$GLB, | Performed by: INTERNAL MEDICINE

## 2023-02-14 PROCEDURE — 1126F AMNT PAIN NOTED NONE PRSNT: CPT | Mod: HCNC,CPTII,S$GLB, | Performed by: INTERNAL MEDICINE

## 2023-02-14 PROCEDURE — 3008F BODY MASS INDEX DOCD: CPT | Mod: HCNC,CPTII,S$GLB, | Performed by: INTERNAL MEDICINE

## 2023-02-14 PROCEDURE — 3075F SYST BP GE 130 - 139MM HG: CPT | Mod: HCNC,CPTII,S$GLB, | Performed by: INTERNAL MEDICINE

## 2023-02-14 PROCEDURE — 1159F MED LIST DOCD IN RCRD: CPT | Mod: HCNC,CPTII,S$GLB, | Performed by: INTERNAL MEDICINE

## 2023-02-14 PROCEDURE — 3008F PR BODY MASS INDEX (BMI) DOCUMENTED: ICD-10-PCS | Mod: HCNC,CPTII,S$GLB, | Performed by: INTERNAL MEDICINE

## 2023-02-14 PROCEDURE — 99204 OFFICE O/P NEW MOD 45 MIN: CPT | Mod: HCNC,GC,S$GLB, | Performed by: INTERNAL MEDICINE

## 2023-02-14 PROCEDURE — 1126F PR PAIN SEVERITY QUANTIFIED, NO PAIN PRESENT: ICD-10-PCS | Mod: HCNC,CPTII,S$GLB, | Performed by: INTERNAL MEDICINE

## 2023-02-14 PROCEDURE — 4010F PR ACE/ARB THEARPY RXD/TAKEN: ICD-10-PCS | Mod: HCNC,CPTII,S$GLB, | Performed by: INTERNAL MEDICINE

## 2023-02-14 PROCEDURE — 1101F PR PT FALLS ASSESS DOC 0-1 FALLS W/OUT INJ PAST YR: ICD-10-PCS | Mod: HCNC,CPTII,S$GLB, | Performed by: INTERNAL MEDICINE

## 2023-02-14 PROCEDURE — 3075F PR MOST RECENT SYSTOLIC BLOOD PRESS GE 130-139MM HG: ICD-10-PCS | Mod: HCNC,CPTII,S$GLB, | Performed by: INTERNAL MEDICINE

## 2023-02-14 PROCEDURE — 99204 PR OFFICE/OUTPT VISIT, NEW, LEVL IV, 45-59 MIN: ICD-10-PCS | Mod: HCNC,GC,S$GLB, | Performed by: INTERNAL MEDICINE

## 2023-02-14 PROCEDURE — 3078F DIAST BP <80 MM HG: CPT | Mod: HCNC,CPTII,S$GLB, | Performed by: INTERNAL MEDICINE

## 2023-02-14 RX ORDER — FLUTICASONE PROPIONATE AND SALMETEROL XINAFOATE 115; 21 UG/1; UG/1
2 AEROSOL, METERED RESPIRATORY (INHALATION) EVERY 12 HOURS
Qty: 12 G | Refills: 3 | Status: SHIPPED | OUTPATIENT
Start: 2023-02-14 | End: 2023-06-20 | Stop reason: SDUPTHER

## 2023-02-14 NOTE — ASSESSMENT & PLAN NOTE
Patient with obstructive lung disease and decreased diffusion concerning for COPD with typical symptoms. He has not been on a controller medication, only rescue inhalers and is GOLD B. Will recommends starting LABA/ICS due to the reversibility associated with PFT in Dec 2022. We will plan for continuing a controller medication for at least six weeks and follow up with repeat PFTs, CT Chest.  - Advair 250 BID prescribed  - follow up in 6-8 weeks    - PFTs w/ bronchodilator    - CT Chest non-contrast

## 2023-02-14 NOTE — PROGRESS NOTES
"Subjective:       Patient ID: Javad Coughlin is a 67 y.o. male.    Chief Complaint: No chief complaint on file.    66yo M w/ PMH DM2, HTN, HLD, obesity who presents to pulmonary clinic for evaluation of COPD. The patient has a > 50 year PPD smoking history having quit in 2017. He was "diagnosed" with COPD a few years ago after worsening shortness of breath and a "breathing test". Since then he has had trouble affording a controller medication and has just used rescue inhalers PRN. He was recently seen by his primary care physician who referred him to pulmonology with PFTs showing moderate obstruction with decreased diffusion. He has never been hospitalized for his respiratory symptoms in the past. He can walk about 100ft before he has to stop, takes 3-5 minutes to recover, and can continue again. He coughs occasionally, does not have any sputum production, and audibly wheezes with increased activity. He denies any weight loss, weight gain, night sweats.     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 His brother dies of mesothelioma and his father had asbestosis who all worked together through their life. He has no other history of asthma or lung diseases as a child growing up.   Review of Systems   Constitutional:  Positive for fatigue. Negative for weight loss, weight gain and night sweats.   HENT: Negative.     Respiratory:  Positive for cough, shortness of breath, wheezing, asthma nighttime symptoms, dyspnea on extertion and use of rescue inhaler. Negative for hemoptysis, chest tightness and previous hospitialization due to pulmonary problems.    Cardiovascular:  Negative for chest pain and leg swelling.   Musculoskeletal:  Positive for arthralgias and back pain.   Neurological:  Negative for dizziness, syncope and headaches.   Psychiatric/Behavioral: Negative.     "   Objective:      Physical Exam   Constitutional: He is oriented to person, place, and time. He appears well-developed and well-nourished. No distress.   Resting saturation 97% on RA  Walking saturation 96% on RA He is obese.   HENT:   Head: Normocephalic.   Neck: No JVD present.   Cardiovascular: Normal rate, regular rhythm and normal heart sounds.   No murmur heard.  Pulmonary/Chest: Normal expansion, symmetric chest wall expansion and effort normal. No stridor. He has no decreased breath sounds. He has wheezes (mild end expiratory wheezing). He has no rhonchi. He has no rales.   Abdominal: Soft.   Neurological: He is alert and oriented to person, place, and time. Gait normal.   Skin: Skin is warm and dry.   Psychiatric: He has a normal mood and affect. His behavior is normal.   Vitals reviewed.  Personal Diagnostic Review    CXR 12/22/22:  FINDINGS:  The lungs are clear, with normal appearance of pulmonary vasculature and no pleural effusion or pneumothorax.     The cardiac silhouette is normal in size.  There are aortic arch calcifications.  The hilar and mediastinal contours are unremarkable.     Visualized osseous structures show no acute abnormalities.     Impression:     No acute radiographic findings in the chest.    PFTs 12/22/22  [place]   FVC  (pre-BD) 2.96   FVC%  63   FEV1 1.52   FEV1%  42.6   FEV1/FVC  51   FEF 25-75  0.70   FEF 25-75%  26   FVC (post-BD) 3.53   FVC% +19   FEV1 2.00   FEV1% +32   FEV1/FVC 57   FEF 25-75 0.98   FEF 25-75% +39   TLC  6.79   TLC%  90   RV  3.62   RV%  138   DLCO  20.1   DLCO%  68   VA 5.19   IVC 2.75   Six-Minute Walk    Distance (meter)    O2 maryana    O2 change        No flowsheet data found.      Assessment:       1. Cough, unspecified type    2. Chronic obstructive pulmonary disease, unspecified COPD type          Outpatient Encounter Medications as of 2/14/2023   Medication Sig Dispense Refill    albuterol (PROVENTIL/VENTOLIN HFA) 90 mcg/actuation inhaler Inhale 2  puffs into the lungs every 6 (six) hours as needed for Wheezing or Shortness of Breath (cough and wheezing). 18 g 5    clobetasoL (TEMOVATE) 0.05 % external solution       clobetasol 0.05% (TEMOVATE) 0.05 % Oint clobetasol 0.05 % topical ointment      gabapentin (NEURONTIN) 800 MG tablet Take 800 mg by mouth 2 (two) times daily.      irbesartan (AVAPRO) 150 MG tablet irbesartan 150 mg tablet (Patient taking differently: Take 150 mg by mouth once daily. irbesartan 150 mg tablet) 30 tablet 5    ketoconazole (NIZORAL) 2 % shampoo       metFORMIN (GLUCOPHAGE) 500 MG tablet Take 1 tablet (500 mg total) by mouth daily with breakfast. 90 tablet 1    mometasone 0.1% (ELOCON) 0.1 % cream       QUEtiapine (SEROQUEL) 25 MG Tab Take 25 mg by mouth nightly as needed.      rosuvastatin (CRESTOR) 20 MG tablet rosuvastatin 20 mg tablet      spironolactone (ALDACTONE) 25 MG tablet Take 1 tablet (25 mg total) by mouth once daily. 90 tablet 3    tadalafiL (CIALIS) 5 MG tablet       [DISCONTINUED] furosemide (LASIX) 20 MG tablet Take 1 tablet (20 mg total) by mouth once daily. 90 tablet 3     No facility-administered encounter medications on file as of 2/14/2023.     No orders of the defined types were placed in this encounter.      Plan:       Problem List Items Addressed This Visit          Pulmonary    COPD (chronic obstructive pulmonary disease) - Primary    Overview     Notes emphysema, quits smoking years ago, 4 years   Had PFTs years ago   Repeat PFTs- 12/23/22 showing Moderate obstruction.           Current Assessment & Plan     Patient with obstructive lung disease and decreased diffusion concerning for COPD with typical symptoms. He has not been on a controller medication, only rescue inhalers and is GOLD B. Will recommends starting LABA/ICS due to the reversibility associated with PFT in Dec 2022. We will plan for continuing a controller medication for at least six weeks and follow up with repeat PFTs, CT Chest.  - Advair  250 BID prescribed  - follow up in 6-8 weeks    - PFTs w/ bronchodilator    - CT Chest non-contrast         Relevant Medications    fluticasone propion-salmeterol 115-21 mcg/dose (ADVAIR HFA) 115-21 mcg/actuation HFAA inhaler    Other Relevant Orders    CT Chest Without Contrast    Complete PFT w/ bronchodilator     Other Visit Diagnoses       Cough, unspecified type        Relevant Orders    CT Chest Without Contrast

## 2023-03-24 ENCOUNTER — PATIENT MESSAGE (OUTPATIENT)
Dept: INTERNAL MEDICINE | Facility: CLINIC | Age: 68
End: 2023-03-24
Payer: MEDICARE

## 2023-03-30 ENCOUNTER — OFFICE VISIT (OUTPATIENT)
Dept: INTERNAL MEDICINE | Facility: CLINIC | Age: 68
End: 2023-03-30
Payer: MEDICARE

## 2023-03-30 VITALS
SYSTOLIC BLOOD PRESSURE: 110 MMHG | WEIGHT: 293.19 LBS | RESPIRATION RATE: 16 BRPM | HEIGHT: 72 IN | BODY MASS INDEX: 39.71 KG/M2 | DIASTOLIC BLOOD PRESSURE: 66 MMHG | HEART RATE: 96 BPM | OXYGEN SATURATION: 95 %

## 2023-03-30 DIAGNOSIS — J01.00 ACUTE NON-RECURRENT MAXILLARY SINUSITIS: Primary | ICD-10-CM

## 2023-03-30 DIAGNOSIS — G57.93 NEUROPATHY OF BOTH FEET: ICD-10-CM

## 2023-03-30 DIAGNOSIS — J44.9 CHRONIC OBSTRUCTIVE PULMONARY DISEASE, UNSPECIFIED COPD TYPE: ICD-10-CM

## 2023-03-30 DIAGNOSIS — R06.2 WHEEZING: ICD-10-CM

## 2023-03-30 DIAGNOSIS — E11.9 TYPE 2 DIABETES MELLITUS WITHOUT COMPLICATION, WITHOUT LONG-TERM CURRENT USE OF INSULIN: ICD-10-CM

## 2023-03-30 PROCEDURE — 1159F MED LIST DOCD IN RCRD: CPT | Mod: HCNC,CPTII,S$GLB, | Performed by: NURSE PRACTITIONER

## 2023-03-30 PROCEDURE — 1126F PR PAIN SEVERITY QUANTIFIED, NO PAIN PRESENT: ICD-10-PCS | Mod: HCNC,CPTII,S$GLB, | Performed by: NURSE PRACTITIONER

## 2023-03-30 PROCEDURE — 99214 PR OFFICE/OUTPT VISIT, EST, LEVL IV, 30-39 MIN: ICD-10-PCS | Mod: HCNC,S$GLB,, | Performed by: NURSE PRACTITIONER

## 2023-03-30 PROCEDURE — 3008F PR BODY MASS INDEX (BMI) DOCUMENTED: ICD-10-PCS | Mod: HCNC,CPTII,S$GLB, | Performed by: NURSE PRACTITIONER

## 2023-03-30 PROCEDURE — 99999 PR PBB SHADOW E&M-EST. PATIENT-LVL III: CPT | Mod: PBBFAC,HCNC,, | Performed by: NURSE PRACTITIONER

## 2023-03-30 PROCEDURE — 1159F PR MEDICATION LIST DOCUMENTED IN MEDICAL RECORD: ICD-10-PCS | Mod: HCNC,CPTII,S$GLB, | Performed by: NURSE PRACTITIONER

## 2023-03-30 PROCEDURE — 4010F PR ACE/ARB THEARPY RXD/TAKEN: ICD-10-PCS | Mod: HCNC,CPTII,S$GLB, | Performed by: NURSE PRACTITIONER

## 2023-03-30 PROCEDURE — 3008F BODY MASS INDEX DOCD: CPT | Mod: HCNC,CPTII,S$GLB, | Performed by: NURSE PRACTITIONER

## 2023-03-30 PROCEDURE — 3078F PR MOST RECENT DIASTOLIC BLOOD PRESSURE < 80 MM HG: ICD-10-PCS | Mod: HCNC,CPTII,S$GLB, | Performed by: NURSE PRACTITIONER

## 2023-03-30 PROCEDURE — 1126F AMNT PAIN NOTED NONE PRSNT: CPT | Mod: HCNC,CPTII,S$GLB, | Performed by: NURSE PRACTITIONER

## 2023-03-30 PROCEDURE — 3078F DIAST BP <80 MM HG: CPT | Mod: HCNC,CPTII,S$GLB, | Performed by: NURSE PRACTITIONER

## 2023-03-30 PROCEDURE — 99214 OFFICE O/P EST MOD 30 MIN: CPT | Mod: HCNC,S$GLB,, | Performed by: NURSE PRACTITIONER

## 2023-03-30 PROCEDURE — 99999 PR PBB SHADOW E&M-EST. PATIENT-LVL III: ICD-10-PCS | Mod: PBBFAC,HCNC,, | Performed by: NURSE PRACTITIONER

## 2023-03-30 PROCEDURE — 99499 UNLISTED E&M SERVICE: CPT | Mod: S$GLB,,, | Performed by: NURSE PRACTITIONER

## 2023-03-30 PROCEDURE — 4010F ACE/ARB THERAPY RXD/TAKEN: CPT | Mod: HCNC,CPTII,S$GLB, | Performed by: NURSE PRACTITIONER

## 2023-03-30 PROCEDURE — 99499 RISK ADDL DX/OHS AUDIT: ICD-10-PCS | Mod: S$GLB,,, | Performed by: NURSE PRACTITIONER

## 2023-03-30 PROCEDURE — 3074F SYST BP LT 130 MM HG: CPT | Mod: HCNC,CPTII,S$GLB, | Performed by: NURSE PRACTITIONER

## 2023-03-30 PROCEDURE — 3074F PR MOST RECENT SYSTOLIC BLOOD PRESSURE < 130 MM HG: ICD-10-PCS | Mod: HCNC,CPTII,S$GLB, | Performed by: NURSE PRACTITIONER

## 2023-03-30 RX ORDER — PREDNISONE 20 MG/1
20 TABLET ORAL 2 TIMES DAILY
Qty: 6 TABLET | Refills: 0 | Status: SHIPPED | OUTPATIENT
Start: 2023-03-30 | End: 2023-03-30 | Stop reason: SDUPTHER

## 2023-03-30 RX ORDER — PREDNISONE 20 MG/1
20 TABLET ORAL 2 TIMES DAILY
Qty: 6 TABLET | Refills: 0 | Status: SHIPPED | OUTPATIENT
Start: 2023-03-30 | End: 2023-04-02

## 2023-03-30 RX ORDER — METRONIDAZOLE 10 MG/G
GEL TOPICAL
COMMUNITY
Start: 2022-12-21

## 2023-03-30 RX ORDER — METFORMIN HYDROCHLORIDE 500 MG/1
500 TABLET, EXTENDED RELEASE ORAL
Qty: 30 TABLET | Refills: 0 | Status: SHIPPED | OUTPATIENT
Start: 2023-03-30 | End: 2023-04-21

## 2023-03-30 RX ORDER — DOXYCYCLINE 100 MG/1
100 CAPSULE ORAL 2 TIMES DAILY
Qty: 14 CAPSULE | Refills: 0 | Status: SHIPPED | OUTPATIENT
Start: 2023-03-30 | End: 2023-08-22

## 2023-03-30 RX ORDER — DOXYCYCLINE 100 MG/1
100 CAPSULE ORAL 2 TIMES DAILY
Qty: 14 CAPSULE | Refills: 0 | Status: SHIPPED | OUTPATIENT
Start: 2023-03-30 | End: 2023-03-30 | Stop reason: SDUPTHER

## 2023-03-30 RX ORDER — GABAPENTIN 800 MG/1
800 TABLET ORAL 2 TIMES DAILY
Qty: 180 TABLET | Refills: 1 | Status: SHIPPED | OUTPATIENT
Start: 2023-03-30 | End: 2023-05-02 | Stop reason: SDUPTHER

## 2023-03-30 NOTE — ASSESSMENT & PLAN NOTE
Advair 250 BID prescribed  Planned for CT of chest and repeat PFTs 4/11/23 ; f/u with pulmonologist

## 2023-03-30 NOTE — PROGRESS NOTES
"Subjective:           Patient ID: Javad Coughlin is a 67 y.o. male.    Chief Complaint: Nasal Congestion (X 3 weeks)    67 y.o. male, here for f/u , recently established ; known Type II DM, COPD, ANTONI, HTN, ^CHOl, BPH, neuropathy, psoriasis    Today, pt complains of productive cough - coughing up green-yellow colored phlegm and sinus congestion for the past 3 weeks. He reports that he ran fever when the symptoms started approximately 3 weeks ago, but has not had fever recently. He is mostly concerned about his congestion at this point. He reports that hears himself wheezing occasionally. Last few weeks he has been more short of breath on exertion. Has been using albuterol inhaler 3 times a day, which is decreased from a few weeks ago. He was recently started on Advair and states that is helping.     Type 2 diabetes mellitus without complications  Dx 3 years ago ; now diet controlled  Losing weight   Notes he was 318 down to 293  Not taking metformin due to diarrhea; ER was not covered  but re-started      Pure hypercholesterolemia, - takes rosuvastatin   Dx 3 yrs ago with DM2         Benign prostatic hyperplasia without lower urinary tract symptoms  Hyperspade -   No recent urology  Tadalafil for prostate      Essential (primary) hypertension;   Dx - 10yrs,  " off and on" ,  irbesartan - 150mg      HOOPER- recent eval with cardiology - discussed   HOOPER (dyspnea on exertion);  it is likely a multifactorial problem.  We discussed chronic diastolic heart failure and its management.   A trial of Lasix 20 mg prescribed; did not note much improvement in HOOPER  Aldactone 25 mg added to  losartan for better blood pressure control an attempt at improvement in shortness of breath.  There could be a component of diastolic heart failure.        Abnormal electrocardiogram (ECG) (EKG)    Per cardiology                 Most recent ECG reviewed.  There are ST T wave abnormalities in the lateral leads.  Coronary artery disease and " dyspnea as anginal equivalent is a possibility triggering dyspnea.      In the future a stress test non ambulatory type to be performed.     COPD-Notes Hx of emphysema, quits smoking years ago, 4 years       PFTs-   Moderate obstruction.   Airflow is improved after bronchodilator  Notes he does get bronchitis at least once a year  Wheezes at night; per wife; uses her inhalers at times   + mucous production   + Chronic García; will start albuterol discussed and refer to Pulmonologist   Consider CT, controller with comb;   Notes he was exposed to asbestosis with work; PARKER dx with mesothelioma   CXR- The lungs are clear, with normal appearance of pulmonary vasculature and no pleural effusion or pneumothorax.     The cardiac silhouette is normal in size.  There are aortic arch calcifications.  The hilar and mediastinal contours are unremarkable.         Obstructive sleep apnea (adult)   Dx - 30 yrs ago ; used CPAP for a while then it got old        Gabapentin for neuropathy- bilateral feet ; Dx 4-5 years   800mg tid- takes 1 in am and 1.5 at night   Related to back issues ; back worse of late  Needing refill on Gabapentin     Benign prostatic hyperplasia without lower urinary tract symptoms  Hyperspade -   No recent urology  Tadalafil for prostate      Quetipaine for insomnia        Notes eczema, [place] psoriasis' going this PM for injeciton= tremfya   Dr. Irwin Braun    Brown annular hyper-pigmented pretibial patches noted - has had this for years      Notes emphysema, quits smoking years ago, 4 years      Obesity -   Wt Readings from Last 3 Encounters:  23 0958 : (!) 138 kg (304 lb 3.8 oz)  23 1304 : (!) 139.1 kg (306 lb 12.3 oz)  22 0854 : (!) 136.9 kg (301 lb 13 oz)        Social ; , youngest of 12 children;   parents  in their 90s, no premature heart hx     Health Maintenance:  -CRC:colonoscopy with Dr. SONAL Mark last year -20 - repeat in 5 years; 2025   Polyps removed - Pathology  tubulr adenoma , hyperplastic polyp   -PSA- check w next labs      -Bone Density  -AAA- due + Ex smoker   -tobacco- quit 4 yrs ago, smoked 1-2ppd since age 11   -Vaccines- due for Tdap, PCV - give today       Review of Systems   Constitutional:  Positive for fatigue. Negative for activity change, appetite change and unexpected weight change.   HENT:  Positive for congestion, postnasal drip, sinus pressure and sinus pain. Negative for ear discharge, ear pain, facial swelling, hearing loss, mouth sores, nosebleeds, tinnitus and trouble swallowing.    Eyes:  Negative for photophobia, discharge, itching and visual disturbance.   Respiratory:  Positive for cough, shortness of breath and wheezing. Negative for apnea, choking, chest tightness and stridor.    Cardiovascular:  Negative for chest pain, palpitations and leg swelling.   Gastrointestinal:  Positive for diarrhea (due to metformin). Negative for abdominal distention, blood in stool, nausea and vomiting.   Endocrine: Negative for cold intolerance and polyuria.   Genitourinary:  Negative for difficulty urinating, dysuria and hematuria.   Musculoskeletal:  Negative for gait problem, joint swelling and myalgias.   Skin:  Negative for color change, pallor, rash and wound.   Neurological:  Negative for tremors, seizures and speech difficulty.   Hematological:  Does not bruise/bleed easily.   Psychiatric/Behavioral:  Negative for agitation, self-injury and sleep disturbance. The patient is not nervous/anxious and is not hyperactive.      Objective:      Physical Exam  Vitals and nursing note reviewed.   Constitutional:       General: He is not in acute distress.     Appearance: Normal appearance. He is well-developed. He is obese. He is not ill-appearing.   HENT:      Head: Normocephalic.      Right Ear: Tympanic membrane normal. There is no impacted cerumen.      Left Ear: Tympanic membrane normal. There is no impacted cerumen.      Nose: Mucosal edema and congestion  present.      Right Sinus: Maxillary sinus tenderness present.      Left Sinus: Maxillary sinus tenderness present.      Mouth/Throat:      Dentition: Normal dentition.      Pharynx: No oropharyngeal exudate.   Eyes:      Pupils: Pupils are equal, round, and reactive to light.   Neck:      Thyroid: No thyromegaly.      Vascular: No JVD.      Trachea: No tracheal deviation.   Cardiovascular:      Rate and Rhythm: Normal rate and regular rhythm.      Heart sounds: Normal heart sounds. No murmur heard.    No gallop.   Pulmonary:      Effort: Pulmonary effort is normal. No respiratory distress.      Breath sounds: Normal breath sounds. No stridor. No wheezing or rales.   Chest:      Chest wall: No tenderness.   Abdominal:      General: Bowel sounds are normal. There is no distension.      Palpations: Abdomen is soft.   Musculoskeletal:         General: No tenderness or deformity. Normal range of motion.      Cervical back: Normal range of motion.   Skin:     General: Skin is warm and dry.      Capillary Refill: Capillary refill takes less than 2 seconds.      Findings: No erythema or rash.   Neurological:      Mental Status: He is alert and oriented to person, place, and time.      Cranial Nerves: No cranial nerve deficit.      Coordination: Coordination normal.   Psychiatric:         Behavior: Behavior normal.         Thought Content: Thought content normal.         Judgment: Judgment normal.       Assessment:       1. Acute non-recurrent maxillary sinusitis    2. Chronic obstructive pulmonary disease, unspecified COPD type    3. Wheezing    4. Type 2 diabetes mellitus without complication, without long-term current use of insulin    5. Neuropathy of both feet        Plan:   1. Acute non-recurrent maxillary sinusitis    2. Chronic obstructive pulmonary disease, unspecified COPD type  Overview:  Notes emphysema, quits smoking years ago, 4 years   Had PFTs years ago   Repeat PFTs- 12/23/22 showing Moderate obstruction.       2/14/23 Per pulmonary Patient with obstructive lung disease and decreased diffusion concerning for COPD with typical symptoms. picture is consistent with COPD (asthmatic variant vs emphysema).    He has not been on a controller medication, only rescue inhalers and is GOLD B. Will recommends starting LABA/ICS due to the reversibility associated with PFT in Dec 2022. We will plan for continuing a controller medication for at least six weeks and follow up with repeat PFTs, CT Chest.  CT for lung cancer screening (in addition to evaluating for asbestos risk).   - Advair 250 BID prescribed  - follow up in 6-8 weeks    - PFTs w/ bronchodilator    - CT Chest non-contrast    Assessment & Plan:  Advair 250 BID prescribed  Planned for CT of chest and repeat PFTs 4/11/23 ; f/u with pulmonologist       3. Wheezing    4. Type 2 diabetes mellitus without complication, without long-term current use of insulin  Overview:  12/21/22 Prior Dx 3 yrs ago; tx with Ozempic- was getting samples   Type 2 diabetes mellitus without complications  Reportedly diet  Controlled; not on Rx   Losing weight   Notes he was 318 down to 301  12/22/22 urine MA normal     Orders:  -     metFORMIN (GLUCOPHAGE-XR) 500 MG ER 24hr tablet; Take 1 tablet (500 mg total) by mouth daily with breakfast.  Dispense: 30 tablet; Refill: 0    5. Neuropathy of both feet  Overview:  1/23 Gabapentin for neuropathy- bilateral feet ; dx 4- 5 yrs ago   800mg tid- takes 1 in am and 1.5 at night     Orders:  -     gabapentin (NEURONTIN) 800 MG tablet; Take 1 tablet (800 mg total) by mouth 2 (two) times daily.  Dispense: 180 tablet; Refill: 1    Other orders  -     Discontinue: doxycycline (MONODOX) 100 MG capsule; Take 1 capsule (100 mg total) by mouth 2 (two) times daily.  Dispense: 14 capsule; Refill: 0  -     Discontinue: predniSONE (DELTASONE) 20 MG tablet; Take 1 tablet (20 mg total) by mouth 2 (two) times daily. for 3 days  Dispense: 6 tablet; Refill: 0  -      doxycycline (MONODOX) 100 MG capsule; Take 1 capsule (100 mg total) by mouth 2 (two) times daily.  Dispense: 14 capsule; Refill: 0  -     predniSONE (DELTASONE) 20 MG tablet; Take 1 tablet (20 mg total) by mouth 2 (two) times daily. for 3 days  Dispense: 6 tablet; Refill: 0

## 2023-04-03 ENCOUNTER — PATIENT MESSAGE (OUTPATIENT)
Dept: ADMINISTRATIVE | Facility: HOSPITAL | Age: 68
End: 2023-04-03
Payer: MEDICARE

## 2023-04-04 ENCOUNTER — CLINICAL SUPPORT (OUTPATIENT)
Dept: INTERNAL MEDICINE | Facility: CLINIC | Age: 68
End: 2023-04-04
Attending: NURSE PRACTITIONER
Payer: MEDICARE

## 2023-04-04 DIAGNOSIS — E11.9 TYPE 2 DIABETES MELLITUS WITHOUT COMPLICATION, UNSPECIFIED WHETHER LONG TERM INSULIN USE: ICD-10-CM

## 2023-04-04 PROCEDURE — 92228 DIABETIC EYE SCREENING PHOTO: ICD-10-PCS | Mod: 26,HCNC,S$GLB, | Performed by: OPHTHALMOLOGY

## 2023-04-04 PROCEDURE — 92228 IMG RTA DETC/MNTR DS PHY/QHP: CPT | Mod: 26,HCNC,S$GLB, | Performed by: OPHTHALMOLOGY

## 2023-04-04 PROCEDURE — 92228 IMG RTA DETC/MNTR DS PHY/QHP: CPT | Mod: HCNC,TC,S$GLB, | Performed by: INTERNAL MEDICINE

## 2023-04-04 PROCEDURE — 92228 DIABETIC EYE SCREENING PHOTO: ICD-10-PCS | Mod: HCNC,TC,S$GLB, | Performed by: INTERNAL MEDICINE

## 2023-04-04 NOTE — PROGRESS NOTES
Javad Coughlin is a 67 y.o. male here for a diabetic eye screening with non-dilated fundus photos per TAURUS Treviño NP.    Patient cooperative?: Yes  Small pupils?: Yes  Last eye exam: Unknown    For exam results, see Encounter Report.

## 2023-04-11 ENCOUNTER — HOSPITAL ENCOUNTER (OUTPATIENT)
Dept: PULMONOLOGY | Facility: CLINIC | Age: 68
Discharge: HOME | End: 2023-04-11
Payer: MEDICARE

## 2023-04-11 ENCOUNTER — HOSPITAL ENCOUNTER (OUTPATIENT)
Dept: RADIOLOGY | Facility: HOSPITAL | Age: 68
Discharge: HOME | End: 2023-04-11
Attending: SURGERY
Payer: MEDICARE

## 2023-04-11 DIAGNOSIS — R05.9 COUGH, UNSPECIFIED TYPE: ICD-10-CM

## 2023-04-11 DIAGNOSIS — J44.9 CHRONIC OBSTRUCTIVE PULMONARY DISEASE, UNSPECIFIED COPD TYPE: ICD-10-CM

## 2023-04-11 PROCEDURE — 71250 CT THORAX DX C-: CPT | Mod: TC,HCNC

## 2023-04-11 PROCEDURE — 94060 PR EVAL OF BRONCHOSPASM: ICD-10-PCS | Mod: HCNC,S$GLB,, | Performed by: INTERNAL MEDICINE

## 2023-04-11 PROCEDURE — 94727 PR PULM FUNCTION TEST BY GAS: ICD-10-PCS | Mod: HCNC,S$GLB,, | Performed by: INTERNAL MEDICINE

## 2023-04-11 PROCEDURE — 71250 CT THORAX DX C-: CPT | Mod: 26,HCNC,, | Performed by: RADIOLOGY

## 2023-04-11 PROCEDURE — 94729 PR C02/MEMBANE DIFFUSE CAPACITY: ICD-10-PCS | Mod: HCNC,S$GLB,, | Performed by: INTERNAL MEDICINE

## 2023-04-11 PROCEDURE — 94727 GAS DIL/WSHOT DETER LNG VOL: CPT | Mod: HCNC,S$GLB,, | Performed by: INTERNAL MEDICINE

## 2023-04-11 PROCEDURE — 71250 CT CHEST WITHOUT CONTRAST: ICD-10-PCS | Mod: 26,HCNC,, | Performed by: RADIOLOGY

## 2023-04-11 PROCEDURE — 94729 DIFFUSING CAPACITY: CPT | Mod: HCNC,S$GLB,, | Performed by: INTERNAL MEDICINE

## 2023-04-11 PROCEDURE — 94060 EVALUATION OF WHEEZING: CPT | Mod: HCNC,S$GLB,, | Performed by: INTERNAL MEDICINE

## 2023-04-27 ENCOUNTER — TELEPHONE (OUTPATIENT)
Dept: INTERNAL MEDICINE | Facility: CLINIC | Age: 68
End: 2023-04-27
Payer: MEDICARE

## 2023-04-27 DIAGNOSIS — N40.0 BENIGN PROSTATIC HYPERPLASIA WITHOUT LOWER URINARY TRACT SYMPTOMS: Primary | ICD-10-CM

## 2023-04-27 DIAGNOSIS — G57.93 NEUROPATHY OF BOTH FEET: ICD-10-CM

## 2023-04-27 NOTE — TELEPHONE ENCOUNTER
----- Message from Siomara Mittal LPN sent at 2023  3:11 PM CDT -----  Contact: pt  Please Advise.  ----- Message -----  From: Kanchan Posada  Sent: 2023   2:27 PM CDT  To: Kamila Munson Staff    Javad Coughlin  MRN: [id number]  : 1955  PCP: Jeimy Treviño  Home Phone      Not on file.  Work Phone      Not on file.  Mobile          [phone]      MESSAGE:     Pt states he was taking tadalafiL (CIALIS) 5 MG tablet through his previous doctor, It is not covered by his [employer] and he is wanting to know if finasteride or Duntasteride would be a good med to switch to. He would like to know if we can send a 90 day supply to [facility] and a 1 month supply through [facility] in [facility].      Please advise  [phone]

## 2023-04-27 NOTE — TELEPHONE ENCOUNTER
Depends on treament indication ; was he using tadalafiL (CIALIS) 5 MG tablet daily for BPH, nocturia?   Can maybe get it cheaper at [facility] here  ; please call to check pricing.   I think if he needs alternate Rx. it would be better to discuss in person and send short rx first.

## 2023-04-28 RX ORDER — FINASTERIDE 5 MG/1
5 TABLET, FILM COATED ORAL DAILY
Qty: 30 TABLET | Refills: 0 | Status: SHIPPED | OUTPATIENT
Start: 2023-04-28 | End: 2023-05-24

## 2023-04-28 NOTE — TELEPHONE ENCOUNTER
Will send finasteride 5mg - 30 day supply to try [facility]  If effective then can send 90d  Needs to call in 2-3 weeks for update.

## 2023-04-28 NOTE — TELEPHONE ENCOUNTER
Patient states he is out of tadalafil. Asking for a rx of Finasteride or Duntasteride be sent to [facility], but is asking for a rx to be sent to [facility] in [place] on [address] until he receives the medication in the mail.

## 2023-04-28 NOTE — TELEPHONE ENCOUNTER
Pt states he was on the tadalafil and it's too expensive. He wants to switch to either Finasteride or Duntasteride which is what was recommended. Would you send either of these for him.     Ph[facility]

## 2023-04-28 NOTE — TELEPHONE ENCOUNTER
Left message on voicemail for patient to contact office.     Spoke with [facility], states Cialis 5mg tablet cost $52.50 for 30 days, $160 for 90 days, or it is $1.75 a tablet.

## 2023-05-01 NOTE — TELEPHONE ENCOUNTER
Pt notified of medication. States it is already helping, but will call back in 2-3 weeks.     Pt states he was wondering if you would be able to increase his gabapentin. He states that his last Rx was for TID. He said he has been taking currently 800 mg, but taking 1.5 tablet BID. Please advise.

## 2023-05-02 ENCOUNTER — TELEPHONE (OUTPATIENT)
Dept: INTERNAL MEDICINE | Facility: CLINIC | Age: 68
End: 2023-05-02
Payer: MEDICARE

## 2023-05-02 DIAGNOSIS — G57.93 NEUROPATHY OF BOTH FEET: ICD-10-CM

## 2023-05-02 RX ORDER — GABAPENTIN 800 MG/1
1200 TABLET ORAL 2 TIMES DAILY
Qty: 270 TABLET | Refills: 1 | Status: SHIPPED | OUTPATIENT
Start: 2023-05-02 | End: 2023-05-02 | Stop reason: SDUPTHER

## 2023-05-02 RX ORDER — GABAPENTIN 800 MG/1
1200 TABLET ORAL 2 TIMES DAILY
Qty: 270 TABLET | Refills: 1 | Status: SHIPPED | OUTPATIENT
Start: 2023-05-02 | End: 2023-06-20 | Stop reason: SDUPTHER

## 2023-05-02 NOTE — TELEPHONE ENCOUNTER
I called patient to give a message from ELLE Munson:The Gabapentin was increased to 1200 mg bid;gabapentin 1200 mg bid sent it to [facility] by accident so it was just sent to your mail off pharmacy for long Rx.  She previously only sent 1m of finasteride to [facility] and intentionally did not send to mail off because she wanted to make sure that this Rx worked well for him prior to sending long Rx. Patient states the Finasteride is working for him he has less frequent urination. Patient voiced understanding to all information given.

## 2023-05-02 NOTE — TELEPHONE ENCOUNTER
So please see other note on gabapentin; sent it to [facility] by accident so I just sent it to his mail off pharmacy for long Rx.  I previously only sent 1m of finasteride to [facility] and intentionally did not send to mail off because I wanted to make sure that this Rx worked well for him prior to sending long Rx

## 2023-05-02 NOTE — TELEPHONE ENCOUNTER
----- Message from Siomara Mittal LPN sent at 2023  2:29 PM CDT -----  Contact: [facility]    ----- Message -----  From: Aisha Reilly  Sent: 2023  11:51 AM CDT  To: Kamila Munson Staff    Javad Coughlin  MRN: [id number]  : 1955  PCP: Jeimy Treviño  Home Phone      Not on file.  Work Phone      Not on file.  Mobile          [phone]      MESSAGE: [facility] with [facility] a fax was sent on  in regards to the pt. And the prescriptions for     finasteride (PROSCAR) 5 mg tablet  Dutasteride 0.5mg      I put another fax in Jeimy's bin in regards to this message

## 2023-05-20 DIAGNOSIS — E11.9 TYPE 2 DIABETES MELLITUS WITHOUT COMPLICATION, WITHOUT LONG-TERM CURRENT USE OF INSULIN: ICD-10-CM

## 2023-05-20 DIAGNOSIS — N40.0 BENIGN PROSTATIC HYPERPLASIA WITHOUT LOWER URINARY TRACT SYMPTOMS: ICD-10-CM

## 2023-05-24 RX ORDER — FINASTERIDE 5 MG/1
TABLET, FILM COATED ORAL
Qty: 30 TABLET | Refills: 2 | Status: SHIPPED | OUTPATIENT
Start: 2023-05-24 | End: 2023-06-20 | Stop reason: SDUPTHER

## 2023-05-24 RX ORDER — METFORMIN HYDROCHLORIDE 500 MG/1
TABLET, EXTENDED RELEASE ORAL
Qty: 30 TABLET | Refills: 2 | Status: SHIPPED | OUTPATIENT
Start: 2023-05-24 | End: 2023-06-20 | Stop reason: SDUPTHER

## 2023-06-20 DIAGNOSIS — I10 PRIMARY HYPERTENSION: ICD-10-CM

## 2023-06-20 DIAGNOSIS — G57.93 NEUROPATHY OF BOTH FEET: ICD-10-CM

## 2023-06-20 DIAGNOSIS — N40.0 BENIGN PROSTATIC HYPERPLASIA WITHOUT LOWER URINARY TRACT SYMPTOMS: ICD-10-CM

## 2023-06-20 DIAGNOSIS — E11.9 TYPE 2 DIABETES MELLITUS WITHOUT COMPLICATION, WITHOUT LONG-TERM CURRENT USE OF INSULIN: ICD-10-CM

## 2023-06-20 DIAGNOSIS — J44.9 CHRONIC OBSTRUCTIVE PULMONARY DISEASE, UNSPECIFIED COPD TYPE: ICD-10-CM

## 2023-06-20 RX ORDER — FINASTERIDE 5 MG/1
5 TABLET, FILM COATED ORAL DAILY
Qty: 30 TABLET | Refills: 5 | Status: SHIPPED | OUTPATIENT
Start: 2023-06-20 | End: 2023-09-19 | Stop reason: SDUPTHER

## 2023-06-20 RX ORDER — IRBESARTAN 150 MG/1
TABLET ORAL
Qty: 30 TABLET | Refills: 5 | Status: SHIPPED | OUTPATIENT
Start: 2023-06-20 | End: 2023-06-22

## 2023-06-20 RX ORDER — GABAPENTIN 800 MG/1
1200 TABLET ORAL 2 TIMES DAILY
Qty: 270 TABLET | Refills: 1 | Status: SHIPPED | OUTPATIENT
Start: 2023-06-20

## 2023-06-20 RX ORDER — FLUTICASONE PROPIONATE AND SALMETEROL XINAFOATE 115; 21 UG/1; UG/1
2 AEROSOL, METERED RESPIRATORY (INHALATION) EVERY 12 HOURS
Qty: 12 G | Refills: 3 | Status: SHIPPED | OUTPATIENT
Start: 2023-06-20 | End: 2023-09-19 | Stop reason: SDUPTHER

## 2023-06-20 RX ORDER — SPIRONOLACTONE 25 MG/1
25 TABLET ORAL DAILY
Qty: 90 TABLET | Refills: 1 | Status: SHIPPED | OUTPATIENT
Start: 2023-06-20 | End: 2024-06-19

## 2023-06-20 RX ORDER — METFORMIN HYDROCHLORIDE 500 MG/1
500 TABLET, EXTENDED RELEASE ORAL DAILY
Qty: 30 TABLET | Refills: 5 | Status: SHIPPED | OUTPATIENT
Start: 2023-06-20 | End: 2023-12-12 | Stop reason: SDUPTHER

## 2023-06-20 NOTE — TELEPHONE ENCOUNTER
----- Message from Kanchan Posada sent at 2023 12:14 PM CDT -----  Contact: pt  Javad Coughlin  MRN: [id number]  : 1955  PCP: Jeimy Treviño  Home Phone      Not on file.  Work Phone      Not on file.  Mobile          [phone]      MESSAGE:     Pt has new insurance and is wanting to send all medication refills to [employer] mail [facility]. They are wanting a list of all medications sent to them pt states    Fax   [phone]  Javad  [phone]

## 2023-06-21 DIAGNOSIS — I10 PRIMARY HYPERTENSION: ICD-10-CM

## 2023-06-22 RX ORDER — IRBESARTAN 150 MG/1
150 TABLET ORAL DAILY
Qty: 90 TABLET | Refills: 1 | Status: SHIPPED | OUTPATIENT
Start: 2023-06-22

## 2023-07-10 ENCOUNTER — PATIENT MESSAGE (OUTPATIENT)
Dept: ADMINISTRATIVE | Facility: HOSPITAL | Age: 68
End: 2023-07-10
Payer: MEDICARE

## 2023-07-11 ENCOUNTER — TELEPHONE (OUTPATIENT)
Dept: INTERNAL MEDICINE | Facility: CLINIC | Age: 68
End: 2023-07-11
Payer: MEDICARE

## 2023-07-11 NOTE — TELEPHONE ENCOUNTER
I called patient to verify that he is still taking his Crestor 20 mg tab daily and the patient stated yes he is. I explained that his insurance company was questioning whether is was still taking the medication. Patient voiced understanding.

## 2023-08-22 ENCOUNTER — OFFICE VISIT (OUTPATIENT)
Dept: INTERNAL MEDICINE | Facility: CLINIC | Age: 68
End: 2023-08-22
Payer: MEDICARE

## 2023-08-22 ENCOUNTER — LAB VISIT (OUTPATIENT)
Dept: LAB | Facility: HOSPITAL | Age: 68
End: 2023-08-22
Attending: NURSE PRACTITIONER
Payer: MEDICARE

## 2023-08-22 VITALS
OXYGEN SATURATION: 95 % | BODY MASS INDEX: 42.05 KG/M2 | RESPIRATION RATE: 20 BRPM | SYSTOLIC BLOOD PRESSURE: 122 MMHG | RESPIRATION RATE: 20 BRPM | HEIGHT: 72 IN | OXYGEN SATURATION: 96 % | DIASTOLIC BLOOD PRESSURE: 68 MMHG | HEART RATE: 76 BPM | HEART RATE: 76 BPM | SYSTOLIC BLOOD PRESSURE: 122 MMHG | BODY MASS INDEX: 42.05 KG/M2 | WEIGHT: 310.44 LBS | WEIGHT: 310.44 LBS | HEIGHT: 72 IN | DIASTOLIC BLOOD PRESSURE: 68 MMHG

## 2023-08-22 DIAGNOSIS — F51.01 PRIMARY INSOMNIA: ICD-10-CM

## 2023-08-22 DIAGNOSIS — N40.0 BENIGN PROSTATIC HYPERPLASIA WITHOUT LOWER URINARY TRACT SYMPTOMS: ICD-10-CM

## 2023-08-22 DIAGNOSIS — E66.01 CLASS 3 SEVERE OBESITY DUE TO EXCESS CALORIES WITH SERIOUS COMORBIDITY AND BODY MASS INDEX (BMI) OF 40.0 TO 44.9 IN ADULT: ICD-10-CM

## 2023-08-22 DIAGNOSIS — E11.9 TYPE 2 DIABETES MELLITUS WITHOUT COMPLICATION, WITHOUT LONG-TERM CURRENT USE OF INSULIN: Primary | ICD-10-CM

## 2023-08-22 DIAGNOSIS — L40.0 PSORIASIS VULGARIS: ICD-10-CM

## 2023-08-22 DIAGNOSIS — I10 PRIMARY HYPERTENSION: ICD-10-CM

## 2023-08-22 DIAGNOSIS — E11.9 TYPE 2 DIABETES MELLITUS WITHOUT COMPLICATION, WITHOUT LONG-TERM CURRENT USE OF INSULIN: ICD-10-CM

## 2023-08-22 DIAGNOSIS — R06.09 DOE (DYSPNEA ON EXERTION): ICD-10-CM

## 2023-08-22 DIAGNOSIS — Z12.5 SCREENING FOR PROSTATE CANCER: ICD-10-CM

## 2023-08-22 DIAGNOSIS — Z00.00 ENCOUNTER FOR PREVENTIVE HEALTH EXAMINATION: Primary | ICD-10-CM

## 2023-08-22 DIAGNOSIS — E78.00 PURE HYPERCHOLESTEROLEMIA: ICD-10-CM

## 2023-08-22 DIAGNOSIS — G57.93 NEUROPATHY OF BOTH FEET: ICD-10-CM

## 2023-08-22 DIAGNOSIS — I70.0 AORTIC ATHEROSCLEROSIS: ICD-10-CM

## 2023-08-22 DIAGNOSIS — J44.9 CHRONIC OBSTRUCTIVE PULMONARY DISEASE, UNSPECIFIED COPD TYPE: ICD-10-CM

## 2023-08-22 DIAGNOSIS — D69.2 SENILE PURPURA: ICD-10-CM

## 2023-08-22 LAB
ALBUMIN SERPL BCP-MCNC: 4 G/DL (ref 3.5–5.2)
ALP SERPL-CCNC: 72 U/L (ref 55–135)
ALT SERPL W/O P-5'-P-CCNC: 27 U/L (ref 10–44)
ANION GAP SERPL CALC-SCNC: 11 MMOL/L (ref 8–16)
ANION GAP SERPL CALC-SCNC: 11 MMOL/L (ref 8–16)
AST SERPL-CCNC: 29 U/L (ref 10–40)
BILIRUB SERPL-MCNC: 0.7 MG/DL (ref 0.1–1)
BUN SERPL-MCNC: 16 MG/DL (ref 8–23)
BUN SERPL-MCNC: 16 MG/DL (ref 8–23)
CALCIUM SERPL-MCNC: 9.1 MG/DL (ref 8.7–10.5)
CALCIUM SERPL-MCNC: 9.1 MG/DL (ref 8.7–10.5)
CHLORIDE SERPL-SCNC: 100 MMOL/L (ref 95–110)
CHLORIDE SERPL-SCNC: 100 MMOL/L (ref 95–110)
CHOLEST SERPL-MCNC: 124 MG/DL (ref 120–199)
CHOLEST/HDLC SERPL: 4.8 {RATIO} (ref 2–5)
CO2 SERPL-SCNC: 26 MMOL/L (ref 23–29)
CO2 SERPL-SCNC: 26 MMOL/L (ref 23–29)
COMPLEXED PSA SERPL-MCNC: 0.3 NG/ML (ref 0–4)
CREAT SERPL-MCNC: 1.3 MG/DL (ref 0.5–1.4)
CREAT SERPL-MCNC: 1.3 MG/DL (ref 0.5–1.4)
EST. GFR  (NO RACE VARIABLE): >60 ML/MIN/1.73 M^2
EST. GFR  (NO RACE VARIABLE): >60 ML/MIN/1.73 M^2
GLUCOSE SERPL-MCNC: 117 MG/DL (ref 70–110)
GLUCOSE SERPL-MCNC: 117 MG/DL (ref 70–110)
HDLC SERPL-MCNC: 26 MG/DL (ref 40–75)
HDLC SERPL: 21 % (ref 20–50)
LDLC SERPL CALC-MCNC: 63.8 MG/DL (ref 63–159)
NONHDLC SERPL-MCNC: 98 MG/DL
POTASSIUM SERPL-SCNC: 4.4 MMOL/L (ref 3.5–5.1)
POTASSIUM SERPL-SCNC: 4.4 MMOL/L (ref 3.5–5.1)
PROT SERPL-MCNC: 7 G/DL (ref 6–8.4)
SODIUM SERPL-SCNC: 137 MMOL/L (ref 136–145)
SODIUM SERPL-SCNC: 137 MMOL/L (ref 136–145)
TRIGL SERPL-MCNC: 171 MG/DL (ref 30–150)

## 2023-08-22 PROCEDURE — 84153 ASSAY OF PSA TOTAL: CPT | Performed by: NURSE PRACTITIONER

## 2023-08-22 PROCEDURE — 83036 HEMOGLOBIN GLYCOSYLATED A1C: CPT | Performed by: NURSE PRACTITIONER

## 2023-08-22 PROCEDURE — 99214 OFFICE O/P EST MOD 30 MIN: CPT | Mod: S$GLB,,, | Performed by: NURSE PRACTITIONER

## 2023-08-22 PROCEDURE — 3074F PR MOST RECENT SYSTOLIC BLOOD PRESSURE < 130 MM HG: ICD-10-PCS | Mod: CPTII,S$GLB,, | Performed by: NURSE PRACTITIONER

## 2023-08-22 PROCEDURE — 3078F PR MOST RECENT DIASTOLIC BLOOD PRESSURE < 80 MM HG: ICD-10-PCS | Mod: CPTII,S$GLB,, | Performed by: NURSE PRACTITIONER

## 2023-08-22 PROCEDURE — 99999 PR PBB SHADOW E&M-EST. PATIENT-LVL IV: CPT | Mod: PBBFAC,,, | Performed by: NURSE PRACTITIONER

## 2023-08-22 PROCEDURE — 3288F FALL RISK ASSESSMENT DOCD: CPT | Mod: CPTII,S$GLB,, | Performed by: NURSE PRACTITIONER

## 2023-08-22 PROCEDURE — 3008F PR BODY MASS INDEX (BMI) DOCUMENTED: ICD-10-PCS | Mod: CPTII,S$GLB,, | Performed by: NURSE PRACTITIONER

## 2023-08-22 PROCEDURE — 1101F PR PT FALLS ASSESS DOC 0-1 FALLS W/OUT INJ PAST YR: ICD-10-PCS | Mod: CPTII,S$GLB,, | Performed by: NURSE PRACTITIONER

## 2023-08-22 PROCEDURE — 3288F PR FALLS RISK ASSESSMENT DOCUMENTED: ICD-10-PCS | Mod: CPTII,S$GLB,, | Performed by: NURSE PRACTITIONER

## 2023-08-22 PROCEDURE — 99999 PR PBB SHADOW E&M-EST. PATIENT-LVL IV: ICD-10-PCS | Mod: PBBFAC,,, | Performed by: NURSE PRACTITIONER

## 2023-08-22 PROCEDURE — 80053 COMPREHEN METABOLIC PANEL: CPT | Performed by: NURSE PRACTITIONER

## 2023-08-22 PROCEDURE — 1159F PR MEDICATION LIST DOCUMENTED IN MEDICAL RECORD: ICD-10-PCS | Mod: CPTII,S$GLB,, | Performed by: NURSE PRACTITIONER

## 2023-08-22 PROCEDURE — 3078F DIAST BP <80 MM HG: CPT | Mod: CPTII,S$GLB,, | Performed by: NURSE PRACTITIONER

## 2023-08-22 PROCEDURE — 1126F AMNT PAIN NOTED NONE PRSNT: CPT | Mod: CPTII,S$GLB,, | Performed by: NURSE PRACTITIONER

## 2023-08-22 PROCEDURE — 1101F PT FALLS ASSESS-DOCD LE1/YR: CPT | Mod: CPTII,S$GLB,, | Performed by: NURSE PRACTITIONER

## 2023-08-22 PROCEDURE — 3074F SYST BP LT 130 MM HG: CPT | Mod: CPTII,S$GLB,, | Performed by: NURSE PRACTITIONER

## 2023-08-22 PROCEDURE — 1170F FXNL STATUS ASSESSED: CPT | Mod: CPTII,S$GLB,, | Performed by: NURSE PRACTITIONER

## 2023-08-22 PROCEDURE — 1126F PR PAIN SEVERITY QUANTIFIED, NO PAIN PRESENT: ICD-10-PCS | Mod: CPTII,S$GLB,, | Performed by: NURSE PRACTITIONER

## 2023-08-22 PROCEDURE — G0439 PR MEDICARE ANNUAL WELLNESS SUBSEQUENT VISIT: ICD-10-PCS | Mod: S$GLB,,, | Performed by: NURSE PRACTITIONER

## 2023-08-22 PROCEDURE — 4010F ACE/ARB THERAPY RXD/TAKEN: CPT | Mod: CPTII,S$GLB,, | Performed by: NURSE PRACTITIONER

## 2023-08-22 PROCEDURE — 80061 LIPID PANEL: CPT | Performed by: NURSE PRACTITIONER

## 2023-08-22 PROCEDURE — 3008F BODY MASS INDEX DOCD: CPT | Mod: CPTII,S$GLB,, | Performed by: NURSE PRACTITIONER

## 2023-08-22 PROCEDURE — 99214 PR OFFICE/OUTPT VISIT, EST, LEVL IV, 30-39 MIN: ICD-10-PCS | Mod: S$GLB,,, | Performed by: NURSE PRACTITIONER

## 2023-08-22 PROCEDURE — 1159F MED LIST DOCD IN RCRD: CPT | Mod: CPTII,S$GLB,, | Performed by: NURSE PRACTITIONER

## 2023-08-22 PROCEDURE — 1170F PR FUNCTIONAL STATUS ASSESSED: ICD-10-PCS | Mod: CPTII,S$GLB,, | Performed by: NURSE PRACTITIONER

## 2023-08-22 PROCEDURE — 36415 COLL VENOUS BLD VENIPUNCTURE: CPT | Performed by: NURSE PRACTITIONER

## 2023-08-22 PROCEDURE — 4010F PR ACE/ARB THEARPY RXD/TAKEN: ICD-10-PCS | Mod: CPTII,S$GLB,, | Performed by: NURSE PRACTITIONER

## 2023-08-22 PROCEDURE — G0439 PPPS, SUBSEQ VISIT: HCPCS | Mod: S$GLB,,, | Performed by: NURSE PRACTITIONER

## 2023-08-22 RX ORDER — FUROSEMIDE 20 MG/1
20 TABLET ORAL DAILY
COMMUNITY
Start: 2023-04-26 | End: 2023-08-22

## 2023-08-22 RX ORDER — GUSELKUMAB 100 MG/ML
INJECTION SUBCUTANEOUS
COMMUNITY
Start: 2023-07-26

## 2023-08-22 NOTE — ASSESSMENT & PLAN NOTE
Lab Results   Component Value Date    WBC 6.76 12/22/2022    HGB 13.1 (L) 12/22/2022    HCT 40.9 12/22/2022    MCV 84 12/22/2022     12/22/2022     Stable   Discussed prevention

## 2023-08-22 NOTE — PROGRESS NOTES
Subjective:           Patient ID: Javad Coughlin is a 68 y.o. male.    Chief Complaint: Follow-up    HPI  Review of Systems   Respiratory:  Positive for shortness of breath (HOOPER chronic).    Musculoskeletal:  Positive for arthralgias and back pain.       Objective:      Physical Exam  Vitals and nursing note reviewed.   Constitutional:       General: He is not in acute distress.     Appearance: Normal appearance. He is well-developed. He is obese. He is not ill-appearing.   HENT:      Head: Normocephalic.      Right Ear: Tympanic membrane normal. There is no impacted cerumen.      Left Ear: Tympanic membrane normal. There is no impacted cerumen.      Nose: No congestion.      Right Sinus: No maxillary sinus tenderness.      Left Sinus: No maxillary sinus tenderness.      Mouth/Throat:      Dentition: Normal dentition.      Pharynx: No oropharyngeal exudate.   Eyes:      Pupils: Pupils are equal, round, and reactive to light.   Neck:      Thyroid: No thyromegaly.      Vascular: No JVD.      Trachea: No tracheal deviation.   Cardiovascular:      Rate and Rhythm: Normal rate and regular rhythm.      Heart sounds: Normal heart sounds. No murmur heard.     No gallop.   Pulmonary:      Effort: Pulmonary effort is normal. No respiratory distress.      Breath sounds: Normal breath sounds. No stridor. No wheezing or rales.   Chest:      Chest wall: No tenderness.   Abdominal:      General: Bowel sounds are normal. There is no distension.      Palpations: Abdomen is soft.   Musculoskeletal:         General: No tenderness or deformity. Normal range of motion.      Cervical back: Normal range of motion.   Skin:     General: Skin is warm and dry.      Capillary Refill: Capillary refill takes less than 2 seconds.      Findings: No erythema or rash.   Neurological:      Mental Status: He is alert and oriented to person, place, and time.      Cranial Nerves: No cranial nerve deficit.      Coordination: Coordination normal.  "  Psychiatric:         Behavior: Behavior normal.         Thought Content: Thought content normal.         Judgment: Judgment normal.         Assessment:       1. Type 2 diabetes mellitus without complication, without long-term current use of insulin    2. Primary hypertension    3. Neuropathy of both feet    4. Chronic obstructive pulmonary disease, unspecified COPD type    5. HOOPER (dyspnea on exertion)    6. Benign prostatic hyperplasia without lower urinary tract symptoms    7. Psoriasis vulgaris    8. Class 3 severe obesity due to excess calories with serious comorbidity and body mass index (BMI) of 40.0 to 44.9 in adult    9. Primary insomnia        Plan:   1. Type 2 diabetes mellitus without complication, without long-term current use of insulin  Overview:  12/21/22 Prior Dx 3 yrs ago; tx with Ozempic- was getting samples   Type 2 diabetes mellitus without complications  diet  Losing weight   Metformin ER 500mg daily   Notes he was 318 down to 301  12/22/22 urine MA normal   ARB  Statin     Assessment & Plan:  Lab Results   Component Value Date    HGBA1C 6.7 (H) 12/22/2022           2. Primary hypertension  Overview:  12/21/22 Dx approx 10 yrs ago   Dx - 10yrs,  " off and on" , irbesartan - 150mg Rx    2/8/23 cardiology evaluation with Dr. Segura   Primary hypertension - Primary        Aldactone 25 mg added to ARB  for better blood pressure control an attempt at improvement in shortness of breath.  There could be a component of diastolic heart failure.           Irbesartan 150mg daily  Aldactone 25mg po daily   Cardiology records noted;     Assessment & Plan:  BP at goal  Stable with Rx       3. Neuropathy of both feet  Overview:  1/23 Gabapentin for neuropathy- bilateral feet ; dx 4- 5 yrs ago   800mg tid- takes 1 in am and 1.5 at night     Assessment & Plan:  Stable with Rx       4. Chronic obstructive pulmonary disease, unspecified COPD type  Overview:  Notes emphysema, quits smoking years ago, 4 years "   Had PFTs years ago   Repeat PFTs- 12/23/22 showing Moderate obstruction.      2/14/23 Per pulmonary Patient with obstructive lung disease and decreased diffusion concerning for COPD with typical symptoms. picture is consistent with COPD (asthmatic variant vs emphysema).    He has not been on a controller medication, only rescue inhalers and is GOLD B. Will recommends starting LABA/ICS due to the reversibility associated with PFT in Dec 2022. We will plan for continuing a controller medication for at least six weeks and follow up with repeat PFTs, CT Chest.  CT for lung cancer screening (in addition to evaluating for asbestos risk).   - Advair 250 BID prescribed  Ventolin 2 p q6 prn   - follow up in 6-8 weeks    - PFTs w/ bronchodilator    - CT Chest non-contrast    Assessment & Plan:  Stable with Rx  Chronic stable baseline HOOPER       5. HOOPER (dyspnea on exertion)  Overview:  He has been taking furosemide 20 mg for clinical assessment of diuretic therapy to improve his shortness of breath.  I ordered a BNP which came back normal on day of blood draw when I last saw him.  He states that it made him thirsty and he urinated more but he did not have improvement in his breathing.  He has limitations due to shortness of breath as well as back pain.  He can walk to his mailbox but has to stop because of shortness of breath.  [place] [facility] [place]  He has been gaining weight.  His breathing has gotten worse as he gained weight.    Furosemide discontinued y, Aldactone 25 mg ordered today.  Follow-up labs advised in 10-14 days after starting Aldactone.  Planned for a four-month follow-up visit.  He has never had a stress test but I am of low suspicion that his shortness of breath symptoms are related to coronary artery disease.  I do plan on assessing the patient for coronary artery disease in the future.       Assessment & Plan:  Noted some improvement in HOOPER with being more active        6. Benign prostatic hyperplasia without lower urinary tract symptoms  Overview:  Established with Dx  cialis 5mg daily   Finasteride 5mg daily     Assessment & Plan:  Stable with Rx       7. Psoriasis vulgaris  Overview:  eczema, Plaque psoriasis; getting injeciton= tremfya     Follows with Dermatologist, Dr. Braun   Clobetasol solution  Mometasone cream   nizoral shampoo       8. Class 3 severe obesity due to excess calories with serious comorbidity and body mass index (BMI) of 40.0 to 44.9 in adult  Overview:  Wt Readings from Last 3 Encounters:   08/22/23 1418 (!) 140.8 kg (310 lb 6.5 oz)   08/22/23 1414 (!) 140.8 kg (310 lb 6.5 oz)   03/30/23 1513 133 kg (293 lb 3.4 oz)           Assessment & Plan:  Notes he felt better at 293    # The patient is asked to make an attempt to improve diet and exercise patterns to aid in medical management of this problem.     # Eat  5 small meals a day.     # Cut out high carbohydrate  foods : bread, rice, pasta, potatoes.     # Exercise/walk 5x/week for at least 30-45  minutes.              9. Primary insomnia  Overview:  Chronic x years   Quetipaine for insomnia    Assessment & Plan:  No longer taking          Needing refill on gabapentin- recent Rx 90 day in June  Notes recent insurance change- will call with correct pharmacy   Due for labs  Has been fasting this am per patient  Call with labs and plan after reviewed  6m f/u

## 2023-08-22 NOTE — ASSESSMENT & PLAN NOTE
Notes he felt better at 293    # The patient is asked to make an attempt to improve diet and exercise patterns to aid in medical management of this problem.     # Eat  5 small meals a day.     # Cut out high carbohydrate  foods : bread, rice, pasta, potatoes.     # Exercise/walk 5x/week for at least 30-45  minutes.

## 2023-08-22 NOTE — PATIENT INSTRUCTIONS
Counseling and Referral of Other Preventative  (Italic type indicates deductible and co-insurance are waived)    Patient Name: Javad Coughlin  Today's Date: 8/22/2023    Health Maintenance       Date Due Completion Date    Shingles Vaccine (1 of 2) Never done ---    COVID-19 Vaccine (4 - Pfizer series) 11/26/2021 10/1/2021    Hemoglobin A1c 06/22/2023 12/22/2022    Influenza Vaccine (1) 09/01/2023 10/20/2022    Diabetes Urine Screening 12/22/2023 12/22/2022    Lipid Panel 12/22/2023 12/22/2022    Foot Exam 01/17/2024 1/17/2023 (Done)    Override on 1/17/2023: Done    Eye Exam 04/06/2024 4/6/2023    High Dose Statin 08/22/2024 8/22/2023    Colorectal Cancer Screening 11/13/2025 11/13/2020    TETANUS VACCINE 01/03/2033 1/3/2023        No orders of the defined types were placed in this encounter.      The following information is provided to all patients.  This information is to help you find resources for any of the problems found today that may be affecting your health:                Living healthy guide: www.[facility].[place].gov      Understanding Diabetes: www.diabetes.org      Eating healthy: www.cdc.gov/healthyweight      CDC home safety checklist: www.cdc.gov/steadi/patient.html      Agency on Aging: www.goea.[place].[facility]      Alcoholics anonymous (AA): www.aa.org      Physical Activity: www.felicia.nih.gov/dw6ayet      Tobacco use: www.quitwithusla.org

## 2023-08-22 NOTE — ASSESSMENT & PLAN NOTE
Chronic stable HOOPER- multifactorial; COPD, obesity , deconditioned   Has noted some improvement with being more active

## 2023-08-22 NOTE — ASSESSMENT & PLAN NOTE
# The patient is asked to make an attempt to improve diet and exercise patterns to aid in medical management of this problem.     # Eat  5 small meals a day.     # Cut out high carbohydrate  foods : bread, rice, pasta, potatoes.     # Exercise/walk 5x/week for at least 30-45  Minutes.  Notes he feels better when lower weight 290s

## 2023-08-22 NOTE — PROGRESS NOTES
Javad Coughlin presented for a  Medicare AWV and comprehensive Health Risk Assessment today. The following components were reviewed and updated:    Medical history  Family History  Social history  Allergies and Current Medications  Health Risk Assessment  Health Maintenance  Care Team         ** See Completed Assessments for Annual Wellness Visit within the encounter summary.**         The following assessments were completed:  Living Situation  CAGE  Depression Screening  Timed Get Up and Go  Whisper Test  Cognitive Function Screening  Nutrition Screening  ADL Screening  PAQ Screening        Vitals:    08/22/23 1418   BP: 122/68   Pulse: 76   Resp: 20   SpO2: 96%   Weight: (!) 140.8 kg (310 lb 6.5 oz)   Height: 6' (1.829 m)     Body mass index is 42.1 kg/m².  Physical Exam  Vitals and nursing note reviewed.   Constitutional:       General: He is not in acute distress.     Appearance: Normal appearance. He is well-developed. He is obese. He is not ill-appearing.   HENT:      Head: Normocephalic.      Right Ear: Tympanic membrane normal. There is no impacted cerumen.      Left Ear: Tympanic membrane normal. There is no impacted cerumen.      Nose: No congestion.      Right Sinus: No maxillary sinus tenderness.      Left Sinus: No maxillary sinus tenderness.      Mouth/Throat:      Dentition: Normal dentition.      Pharynx: No oropharyngeal exudate.   Eyes:      Pupils: Pupils are equal, round, and reactive to light.   Neck:      Thyroid: No thyromegaly.      Vascular: No JVD.      Trachea: No tracheal deviation.   Cardiovascular:      Rate and Rhythm: Normal rate and regular rhythm.      Heart sounds: Normal heart sounds. No murmur heard.     No gallop.   Pulmonary:      Effort: Pulmonary effort is normal. No respiratory distress.      Breath sounds: Normal breath sounds. No stridor. No wheezing or rales.   Chest:      Chest wall: No tenderness.   Abdominal:      General: Bowel sounds are normal. There is no  "distension.      Palpations: Abdomen is soft.   Musculoskeletal:         General: No tenderness or deformity. Normal range of motion.      Cervical back: Normal range of motion.   Skin:     General: Skin is warm and dry.      Capillary Refill: Capillary refill takes less than 2 seconds.      Findings: No erythema or rash.      Comments: Large scar to left forearm from skin graft  Noted hx of IV drug abuse and overdose    Neurological:      Mental Status: He is alert and oriented to person, place, and time.      Cranial Nerves: No cranial nerve deficit.      Coordination: Coordination normal.   Psychiatric:         Behavior: Behavior normal.         Thought Content: Thought content normal.         Judgment: Judgment normal.               Diagnoses and health risks identified today and associated recommendations/orders:    1. Encounter for preventive health examination  Health maintenance reviewed; Shingles vaccine Rx provided and reviewed.   ACP documents reviewed.   Exam stable. Diet and exercise encouraged.       2. Aortic atherosclerosis  Overview:  12/22/22 CXR- aortic arch calcifications  Rosuvastatin 20mg   Ecotrin     Assessment & Plan:  Stable with Rx       3. Primary hypertension  Overview:  12/21/22 Dx approx 10 yrs ago   Dx - 10yrs,  " off and on" , irbesartan - 150mg Rx    2/8/23 cardiology evaluation with Dr. Segura   Primary hypertension - Primary        Aldactone 25 mg added to ARB  for better blood pressure control an attempt at improvement in shortness of breath.  There could be a component of diastolic heart failure.           Irbesartan 150mg daily  Aldactone 25mg po daily   Cardiology records noted;     Assessment & Plan:  BP at goal 122/68, stable with Rx       4. Type 2 diabetes mellitus without complication, without long-term current use of insulin  Overview:  12/21/22 Prior Dx 3 yrs ago; tx with Ozempic- was getting samples   Type 2 diabetes mellitus without complications  diet  Losing " weight   Metformin ER 500mg daily   Notes he was 318 down to 301  12/22/22 urine MA normal   ARB  Statin     Assessment & Plan:  Lab Results   Component Value Date    HGBA1C 6.7 (H) 12/22/2022           5. Pure hypercholesterolemia  Overview:  Dx ~ 3 yrs ago with DM2    Assessment & Plan:  Lab Results   Component Value Date    LDLCALC 73.2 12/22/2022     Due for labs       6. Chronic obstructive pulmonary disease, unspecified COPD type  Overview:  Notes emphysema, quits smoking years ago, 4 years   Had PFTs years ago   Repeat PFTs- 12/23/22 showing Moderate obstruction.      2/14/23 Per pulmonary Patient with obstructive lung disease and decreased diffusion concerning for COPD with typical symptoms. picture is consistent with COPD (asthmatic variant vs emphysema).    He has not been on a controller medication, only rescue inhalers and is GOLD B. Will recommends starting LABA/ICS due to the reversibility associated with PFT in Dec 2022. We will plan for continuing a controller medication for at least six weeks and follow up with repeat PFTs, CT Chest.  CT for lung cancer screening (in addition to evaluating for asbestos risk).   - Advair 250 BID prescribed  Ventolin 2 p q6 prn   - follow up in 6-8 weeks    - PFTs w/ bronchodilator    - CT Chest non-contrast    Assessment & Plan:  Stable with Rx   No acute symptoms  Chronic stable HOOPER- multifactorial; COPD, obesity , deconditioned       7. Neuropathy of both feet  Overview:  1/23 Gabapentin for neuropathy- bilateral feet ; dx 4- 5 yrs ago   800mg tid- takes 1 in am and 1.5 at night     Assessment & Plan:  Stable with Rx       8. Benign prostatic hyperplasia without lower urinary tract symptoms  Overview:  Established with Dx  cialis 5mg daily   Finasteride 5mg daily     Assessment & Plan:  Stable with Rx       9. HOOPER (dyspnea on exertion)  Overview:  He has been taking furosemide 20 mg for clinical assessment of diuretic therapy to improve his shortness of breath.  I  ordered a BNP which came back normal on day of blood draw when I last saw him.  He states that it made him thirsty and he urinated more but he did not have improvement in his breathing.  He has limitations due to shortness of breath as well as back pain.  He can walk to his mailbox but has to stop because of shortness of breath.  [place] [facility] [place].  He has been gaining weight.  His breathing has gotten worse as he gained weight.    Furosemide discontinued y, Aldactone 25 mg ordered today.  Follow-up labs advised in 10-14 days after starting Aldactone.  Planned for a four-month follow-up visit.  He has never had a stress test but I am of low suspicion that his shortness of breath symptoms are related to coronary artery disease.  I do plan on assessing the patient for coronary artery disease in the future.       Assessment & Plan:  Chronic stable HOOPER- multifactorial; COPD, obesity , deconditioned   Has noted some improvement with being more active       10. Psoriasis vulgaris  Overview:  eczema, Plaque psoriasis; getting injeciton= tremfya     Follows with Dermatologist, Dr. Braun   Clobetasol solution  Mometasone cream   nizoral shampoo     Assessment & Plan:  Stable with Rx       11. Senile purpura  Overview:  Noted on exam     Assessment & Plan:  Lab Results   Component Value Date    WBC 6.76 12/22/2022    HGB 13.1 (L) 12/22/2022    HCT 40.9 12/22/2022    MCV 84 12/22/2022     12/22/2022     Stable   Discussed prevention         12. Class 3 severe obesity due to excess calories with serious comorbidity and body mass index (BMI) of 40.0 to 44.9 in adult  Overview:  Wt Readings from Last 3 Encounters:   08/22/23 1418 (!) 140.8 kg (310 lb 6.5 oz)   08/22/23 1414 (!) 140.8 kg (310 lb 6.5 oz)   03/30/23 1513 133 kg (293 lb 3.4 oz)           Assessment & Plan:    # The patient is asked to make an attempt to improve diet and exercise patterns to aid in medical management  of this problem.     # Eat  5 small meals a day.     # Cut out high carbohydrate  foods : bread, rice, pasta, potatoes.     # Exercise/walk 5x/week for at least 30-45  Minutes.  Notes he feels better when lower weight 290s                      Provided Javad with a 5-10 year written screening schedule and personal prevention plan. Recommendations were developed using the USPSTF age appropriate recommendations. Education, counseling, and referrals were provided as needed. After Visit Summary printed and given to patient which includes a list of additional screenings\tests needed.    No follow-ups on file.    Jeimy Treviño, ILEANA offered to discuss advanced care planning, including how to pick a person who would make decisions for you if you were unable to make them for yourself, called a health care power of , and what kind of decisions you might make such as use of life sustaining treatments such as ventilators and tube feeding when faced with a life limiting illness recorded on a living will that they will need to know. (How you want to be cared for as you near the end of your natural life)     X Patient is interested in learning more about how to make advanced directives.  I provided them paperwork and offered to discuss this with them.

## 2023-08-22 NOTE — ASSESSMENT & PLAN NOTE
Stable with Rx   No acute symptoms  Chronic stable HOOPER- multifactorial; COPD, obesity , deconditioned

## 2023-08-23 ENCOUNTER — PATIENT MESSAGE (OUTPATIENT)
Dept: CARDIOLOGY | Facility: CLINIC | Age: 68
End: 2023-08-23
Payer: MEDICARE

## 2023-08-23 DIAGNOSIS — I10 PRIMARY HYPERTENSION: Primary | ICD-10-CM

## 2023-08-23 DIAGNOSIS — E11.9 TYPE 2 DIABETES MELLITUS WITHOUT COMPLICATION, WITHOUT LONG-TERM CURRENT USE OF INSULIN: ICD-10-CM

## 2023-08-23 LAB
ESTIMATED AVG GLUCOSE: 151 MG/DL (ref 68–131)
HBA1C MFR BLD: 6.9 % (ref 4–5.6)

## 2023-09-18 NOTE — TELEPHONE ENCOUNTER
----- Message from Kanchan Posada sent at 2023  2:47 PM CDT -----  Contact: pt  Javad Coughlin  MRN: [id number]  : 1955  PCP: Jeimy Treviño  Home Phone      Not on file.  Work Phone      Not on file.  [employer]          [phone]      MESSAGE:     Pt needs a refill of rosuvastatin (CRESTOR) 20 MG tablet sent to [facility] [place].         Javad   [phone]

## 2023-09-19 DIAGNOSIS — N40.0 BENIGN PROSTATIC HYPERPLASIA WITHOUT LOWER URINARY TRACT SYMPTOMS: ICD-10-CM

## 2023-09-19 DIAGNOSIS — J41.1 MUCOPURULENT CHRONIC BRONCHITIS: ICD-10-CM

## 2023-09-19 DIAGNOSIS — J44.9 CHRONIC OBSTRUCTIVE PULMONARY DISEASE, UNSPECIFIED COPD TYPE: ICD-10-CM

## 2023-09-19 RX ORDER — ROSUVASTATIN CALCIUM 20 MG/1
TABLET, COATED ORAL
Qty: 90 TABLET | Refills: 3 | Status: SHIPPED | OUTPATIENT
Start: 2023-09-19 | End: 2023-12-12 | Stop reason: SDUPTHER

## 2023-09-19 RX ORDER — FINASTERIDE 5 MG/1
5 TABLET, FILM COATED ORAL DAILY
Qty: 90 TABLET | Refills: 3 | Status: SHIPPED | OUTPATIENT
Start: 2023-09-19

## 2023-09-19 RX ORDER — ALBUTEROL SULFATE 90 UG/1
2 AEROSOL, METERED RESPIRATORY (INHALATION) EVERY 6 HOURS PRN
Qty: 18 G | Refills: 5 | Status: SHIPPED | OUTPATIENT
Start: 2023-09-19 | End: 2024-09-18

## 2023-09-19 RX ORDER — FLUTICASONE PROPIONATE AND SALMETEROL XINAFOATE 115; 21 UG/1; UG/1
2 AEROSOL, METERED RESPIRATORY (INHALATION) EVERY 12 HOURS
Qty: 12 G | Refills: 3 | Status: SHIPPED | OUTPATIENT
Start: 2023-09-19 | End: 2024-09-18

## 2023-09-19 NOTE — TELEPHONE ENCOUNTER
----- Message from Kanchan Posada sent at 2023  3:30 PM CDT -----  Contact: pt  Javad Coughlin  MRN: [id number]  : 1955  PCP: Jeimy Treviño  Home Phone      Not on file.  Work Phone      Not on file.  [employer]          [phone]      MESSAGE:     Pt needs a refill of finasteride (PROSCAR) 5 mg tablet and albuterol (PROVENTIL/VENTOLIN HFA) 90 mcg/actuation inhaler and fluticasone propion-salmeterol 115-21 mcg/dose (ADVAIR HFA) 115-21 mcg/actuation HFAA [facility]         Javad   [phone]

## 2023-09-22 ENCOUNTER — TELEPHONE (OUTPATIENT)
Dept: INTERNAL MEDICINE | Facility: CLINIC | Age: 68
End: 2023-09-22
Payer: MEDICARE

## 2023-09-22 NOTE — TELEPHONE ENCOUNTER
----- Message from Kanchan Posada sent at 2023  2:58 PM CDT -----  Contact: pt  Javad Coughlin  MRN: [id number]  : 1955  PCP: Jeimy Treviño  Home Phone      Not on file.  Work Phone      Not on file.  [employer]          [phone]      MESSAGE:     Pt states he has been having chest congestion and sinus issues and states he needs antibiotics sent in to [facility] on [facility] in [place].      Please advise  [phone]

## 2023-09-22 NOTE — TELEPHONE ENCOUNTER
"Pt was advise to go to urgent care for evaluation since he stated he has been taking over the counter meds and they not helping, he stated "I'm not going spend any money there" I offered to get him an appointment the week coming he complained as to why  cant abx be called in, I stated it he would need evaluation.I had placed him on hold since we would need an override opt hung up before he got apt.  "

## 2023-12-12 DIAGNOSIS — E11.9 TYPE 2 DIABETES MELLITUS WITHOUT COMPLICATION, WITHOUT LONG-TERM CURRENT USE OF INSULIN: ICD-10-CM

## 2023-12-13 RX ORDER — ROSUVASTATIN CALCIUM 20 MG/1
TABLET, COATED ORAL
Qty: 90 TABLET | Refills: 1 | Status: SHIPPED | OUTPATIENT
Start: 2023-12-13 | End: 2023-12-13

## 2023-12-13 RX ORDER — METFORMIN HYDROCHLORIDE 500 MG/1
500 TABLET, EXTENDED RELEASE ORAL DAILY
Qty: 90 TABLET | Refills: 1 | Status: SHIPPED | OUTPATIENT
Start: 2023-12-13

## 2023-12-13 RX ORDER — ROSUVASTATIN CALCIUM 20 MG/1
TABLET, COATED ORAL
Qty: 90 TABLET | Refills: 1 | Status: SHIPPED | OUTPATIENT
Start: 2023-12-13

## 2024-01-11 ENCOUNTER — HOSPITAL ENCOUNTER (EMERGENCY)
Facility: HOSPITAL | Age: 69
Discharge: HOME | End: 2024-01-11
Attending: SURGERY
Payer: MEDICARE

## 2024-01-11 ENCOUNTER — OFFICE VISIT (OUTPATIENT)
Dept: FAMILY MEDICINE | Facility: CLINIC | Age: 69
End: 2024-01-11
Payer: MEDICARE

## 2024-01-11 VITALS
HEIGHT: 72 IN | SYSTOLIC BLOOD PRESSURE: 122 MMHG | WEIGHT: 298.38 LBS | DIASTOLIC BLOOD PRESSURE: 74 MMHG | HEART RATE: 84 BPM | OXYGEN SATURATION: 98 % | BODY MASS INDEX: 40.41 KG/M2 | RESPIRATION RATE: 16 BRPM

## 2024-01-11 VITALS
WEIGHT: 298 LBS | HEART RATE: 79 BPM | OXYGEN SATURATION: 98 % | SYSTOLIC BLOOD PRESSURE: 190 MMHG | DIASTOLIC BLOOD PRESSURE: 81 MMHG | RESPIRATION RATE: 16 BRPM | TEMPERATURE: 98 F | BODY MASS INDEX: 40.42 KG/M2
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
## 2024-01-11 DIAGNOSIS — R07.81 RIB PAIN: ICD-10-CM
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
## 2024-01-11 DIAGNOSIS — K29.80 DUODENITIS: Primary | ICD-10-CM

## 2024-01-11 DIAGNOSIS — R10.84 GENERALIZED ABDOMINAL PAIN: ICD-10-CM

## 2024-01-11 LAB
ALBUMIN SERPL BCP-MCNC: 3.6 G/DL (ref 3.5–5.2)
ALP SERPL-CCNC: 82 U/L (ref 55–135)
ALT SERPL W/O P-5'-P-CCNC: 19 U/L (ref 10–44)
ANION GAP SERPL CALC-SCNC: 7 MMOL/L (ref 8–16)
AST SERPL-CCNC: 17 U/L (ref 10–40)
BASOPHILS # BLD AUTO: 0.04 K/UL (ref 0–0.2)
BASOPHILS NFR BLD: 0.5 % (ref 0–1.9)
BILIRUB SERPL-MCNC: 0.4 MG/DL (ref 0.1–1)
BUN SERPL-MCNC: 11 MG/DL (ref 8–23)
CALCIUM SERPL-MCNC: 9.6 MG/DL (ref 8.7–10.5)
CHLORIDE SERPL-SCNC: 106 MMOL/L (ref 95–110)
CO2 SERPL-SCNC: 27 MMOL/L (ref 23–29)
CREAT SERPL-MCNC: 1.1 MG/DL (ref 0.5–1.4)
DIFFERENTIAL METHOD BLD: ABNORMAL
EOSINOPHIL # BLD AUTO: 0.5 K/UL (ref 0–0.5)
EOSINOPHIL NFR BLD: 6.2 % (ref 0–8)
ERYTHROCYTE [DISTWIDTH] IN BLOOD BY AUTOMATED COUNT: 14.1 % (ref 11.5–14.5)
EST. GFR  (NO RACE VARIABLE): >60 ML/MIN/1.73 M^2
GLUCOSE SERPL-MCNC: 118 MG/DL (ref 70–110)
HCT VFR BLD AUTO: 39.6 % (ref 40–54)
HGB BLD-MCNC: 13 G/DL (ref 14–18)
IMM GRANULOCYTES # BLD AUTO: 0.03 K/UL (ref 0–0.04)
IMM GRANULOCYTES NFR BLD AUTO: 0.4 % (ref 0–0.5)
LIPASE SERPL-CCNC: 43 U/L (ref 4–60)
LYMPHOCYTES # BLD AUTO: 1.4 K/UL (ref 1–4.8)
LYMPHOCYTES NFR BLD: 16.9 % (ref 18–48)
MCH RBC QN AUTO: 27.9 PG (ref 27–31)
MCHC RBC AUTO-ENTMCNC: 32.8 G/DL (ref 32–36)
MCV RBC AUTO: 85 FL (ref 82–98)
MONOCYTES # BLD AUTO: 0.6 K/UL (ref 0.3–1)
MONOCYTES NFR BLD: 7.5 % (ref 4–15)
NEUTROPHILS # BLD AUTO: 5.7 K/UL (ref 1.8–7.7)
NEUTROPHILS NFR BLD: 68.5 % (ref 38–73)
NRBC BLD-RTO: 0 /100 WBC
PLATELET # BLD AUTO: 218 K/UL (ref 150–450)
PMV BLD AUTO: 8.8 FL (ref 9.2–12.9)
POTASSIUM SERPL-SCNC: 4.1 MMOL/L (ref 3.5–5.1)
PROT SERPL-MCNC: 7.4 G/DL (ref 6–8.4)
RBC # BLD AUTO: 4.66 M/UL (ref 4.6–6.2)
SODIUM SERPL-SCNC: 140 MMOL/L (ref 136–145)
WBC # BLD AUTO: 8.29 K/UL (ref 3.9–12.7)

## 2024-01-11 PROCEDURE — 25000003 PHARM REV CODE 250

## 2024-01-11 PROCEDURE — 83690 ASSAY OF LIPASE: CPT

## 2024-01-11 PROCEDURE — 99499 UNLISTED E&M SERVICE: CPT | Mod: S$GLB,,, | Performed by: NURSE PRACTITIONER

## 2024-01-11 PROCEDURE — 80053 COMPREHEN METABOLIC PANEL: CPT

## 2024-01-11 PROCEDURE — 85025 COMPLETE CBC W/AUTO DIFF WBC: CPT

## 2024-01-11 PROCEDURE — 99284 EMERGENCY DEPT VISIT MOD MDM: CPT | Mod: 25

## 2024-01-11 PROCEDURE — 99999 PR PBB SHADOW E&M-EST. PATIENT-LVL III: CPT | Mod: PBBFAC,,, | Performed by: NURSE PRACTITIONER

## 2024-01-11 PROCEDURE — 36415 COLL VENOUS BLD VENIPUNCTURE: CPT

## 2024-01-11 RX ORDER — PANTOPRAZOLE SODIUM 40 MG/1
40 TABLET, DELAYED RELEASE ORAL
Status: COMPLETED | OUTPATIENT
Start: 2024-01-11 | End: 2024-01-11

## 2024-01-11 RX ORDER — HYDROCODONE BITARTRATE AND ACETAMINOPHEN 5; 325 MG/1; MG/1
1 TABLET ORAL
Status: COMPLETED | OUTPATIENT
Start: 2024-01-11 | End: 2024-01-11

## 2024-01-11 RX ORDER — PANTOPRAZOLE SODIUM 40 MG/1
40 TABLET, DELAYED RELEASE ORAL DAILY
Qty: 30 TABLET | Refills: 0 | Status: SHIPPED | OUTPATIENT
Start: 2024-01-11 | End: 2024-02-10

## 2024-01-11 RX ORDER — HYDROCODONE BITARTRATE AND ACETAMINOPHEN 7.5; 325 MG/1; MG/1
1 TABLET ORAL EVERY 8 HOURS PRN
Qty: 18 TABLET | Refills: 0 | Status: SHIPPED | OUTPATIENT
Start: 2024-01-11

## 2024-01-11 RX ORDER — METHOCARBAMOL 500 MG/1
1000 TABLET, FILM COATED ORAL
Status: COMPLETED | OUTPATIENT
Start: 2024-01-11 | End: 2024-01-11

## 2024-01-11 RX ADMIN — METHOCARBAMOL TABLETS 1000 MG: 500 TABLET, COATED ORAL at 08:01

## 2024-01-11 RX ADMIN — PANTOPRAZOLE SODIUM 40 MG: 40 TABLET, DELAYED RELEASE ORAL at 08:01

## 2024-01-11 RX ADMIN — HYDROCODONE BITARTRATE AND ACETAMINOPHEN 1 TABLET: 5; 325 TABLET ORAL at 06:01

## 2024-01-11 NOTE — PROGRESS NOTES
Subjective:       Patient ID: Javad Coughlin is a 68 y.o. male.    Chief Complaint: Follow-up (Pt is here for a fall f/u. Pt says the pain is in his ribs on both sides and in the abdomen. The pt says he feel like he's bruised and having trouble breathing. Pt says he was bite by a cat recently and had a reaction in his eyes. )    Follow-up  Associated symptoms include abdominal pain and arthralgias (right ribs). Pertinent negatives include no congestion, coughing, fatigue, fever, headaches, nausea, rash, sore throat or vomiting.     Review of Systems   Constitutional: Negative.  Negative for appetite change, fatigue and fever.   HENT: Negative.  Negative for congestion, ear pain and sore throat.    Eyes: Negative.  Negative for visual disturbance.   Respiratory:  Positive for shortness of breath. Negative for cough and wheezing.    Cardiovascular: Negative.    Gastrointestinal:  Positive for abdominal pain. Negative for diarrhea, nausea and vomiting.   Genitourinary: Negative.  Negative for difficulty urinating.   Musculoskeletal:  Positive for arthralgias (right ribs).   Skin: Negative.  Negative for rash.   Neurological: Negative.  Negative for headaches.   Psychiatric/Behavioral: Negative.  Negative for sleep disturbance. The patient is not nervous/anxious.    All other systems reviewed and are negative.      Objective:      Physical Exam  Vitals and nursing note reviewed.   Constitutional:       General: He is not in acute distress.     Appearance: Normal appearance. He is well-developed.   HENT:      Head: Normocephalic and atraumatic.   Eyes:      Pupils: Pupils are equal, round, and reactive to light.   Cardiovascular:      Rate and Rhythm: Normal rate and regular rhythm.      Pulses: Normal pulses.      Heart sounds: Normal heart sounds. No murmur heard.  Pulmonary:      Effort: Pulmonary effort is normal. No respiratory distress.      Breath sounds: Normal breath sounds.      Comments: Clearly  SOB  Abdominal:      Tenderness: There is abdominal tenderness (to palpation). There is no right CVA tenderness or left CVA tenderness.   Musculoskeletal:      Cervical back: Normal range of motion and neck supple.      Comments: Guarding back and ribs on the right   Skin:     General: Skin is warm and dry.   Neurological:      Mental Status: He is alert and oriented to person, place, and time.   Psychiatric:         Mood and Affect: Mood normal.         Assessment:       1. Fall, initial encounter    2. Rib pain    3. Generalized abdominal pain        Plan:     1. Fall, initial encounter    2. Rib pain    3. Generalized abdominal pain     Sent to ER

## 2024-01-12 NOTE — ED PROVIDER NOTES
Encounter Date: 2024       History     Chief Complaint   Patient presents with    Fall     Pt c/o bilateral rib s/t fall last week.     This note is dictated on M*Modal word recognition program.  There are word recognition mistakes and grammatical errors that are occasionally missed on review.     Javad Coughlin is a 68 y.o. male presents to ER today with complaints of fall sometime last week.  Patient is unsure of the date.  Patient reports bilateral rib pain, and epigastric pain since fall.  Patient was seen by PCP today and sent to ER for abdominal pain/fall workup.  Patient reports he feels like his posterior lower ribs have been hurting him since he fell.  Patient reports he stepped over a small fence and tripped and fell onto the ground.      The history is provided by the patient.     Review of patient's allergies indicates:  No Known Allergies  Past Medical History:   Diagnosis Date    COPD (chronic obstructive pulmonary disease)     Diabetes mellitus, type 2     Eczema     Plaque psoriasis     Rosacea      Past Surgical History:   Procedure Laterality Date    ADENOIDECTOMY      SKIN GRAFT      SPINE SURGERY      TONSILLECTOMY       Family History   Problem Relation Age of Onset    Cancer Mother     Parkinsonism Sister     Diabetes Sister     Alcohol abuse Brother         Quit Drinking and Smoking    Asbestos Brother     Parkinsonism Brother      Social History     Tobacco Use    Smoking status: Former     Current packs/day: 0.00     Types: Cigarettes     Quit date: 2019     Years since quittin.5     Passive exposure: Never    Smokeless tobacco: Never    Tobacco comments:     1-2 packs a day   Substance Use Topics    Alcohol use: Not Currently    Drug use: Never     Review of Systems   Constitutional: Negative.    HENT: Negative.     Eyes: Negative.    Respiratory: Negative.     Gastrointestinal:  Positive for abdominal pain.   Endocrine: Negative.    Musculoskeletal:  Positive for  arthralgias.   Skin: Negative.    Allergic/Immunologic: Negative.    Neurological: Negative.    Hematological: Negative.    Psychiatric/Behavioral: Negative.         Physical Exam     Initial Vitals   BP Pulse Resp Temp SpO2   01/11/24 1759 01/11/24 1759 01/11/24 1758 01/11/24 1759 01/11/24 1759   (!) 190/81 79 16 98.1 °F (36.7 °C) 98 %      MAP       --                Physical Exam    Constitutional: He appears well-developed and well-nourished. He is not diaphoretic.   HENT:   Right Ear: External ear normal.   Left Ear: External ear normal.   Eyes: Pupils are equal, round, and reactive to light. Right eye exhibits no discharge. Left eye exhibits no discharge.   Neck: Neck supple.   Cardiovascular:  Normal rate.     Exam reveals no friction rub.       No murmur heard.  Pulmonary/Chest: Breath sounds normal. No respiratory distress. He has no wheezes. He has no rales.   Abdominal: Abdomen is soft. He exhibits no mass. There is abdominal tenderness (patient has epigastric tenderness). There is no rebound.   Musculoskeletal:         General: No tenderness or edema.      Cervical back: Neck supple.     Neurological: He is alert and oriented to person, place, and time.   Skin: Capillary refill takes less than 2 seconds.   Psychiatric: He has a normal mood and affect. His behavior is normal. Judgment and thought content normal.         ED Course   Procedures  Labs Reviewed   CBC W/ AUTO DIFFERENTIAL - Abnormal; Notable for the following components:       Result Value    Hemoglobin 13.0 (*)     Hematocrit 39.6 (*)     MPV 8.8 (*)     Lymph % 16.9 (*)     All other components within normal limits   COMPREHENSIVE METABOLIC PANEL - Abnormal; Notable for the following components:    Glucose 118 (*)     Anion Gap 7 (*)     All other components within normal limits   LIPASE          Imaging Results              CT Chest Abdomen Pelvis Without Contrast (XPD) (Final result)  Result time 01/11/24 19:04:02   Procedure changed from  CT Chest Without Contrast     Final result by Vargas Mccray MD (01/11/24 19:04:02)                   Impression:      Retroperitoneal soft tissue stranding about pancreas and 3rd duodenum the appearance is more suggestive of pancreatitis or duodenitis rather than related to the recent trauma.  Clinical correlation is advised.    Cholelithiasis.    Stable pulmonary nodules.    Vascular calcifications.      Electronically signed by: Vargas Mccray MD  Date:    01/11/2024  Time:    19:04               Narrative:    EXAMINATION:  CT CHEST ABDOMEN PELVIS WITHOUT CONTRAST(XPD)    CLINICAL HISTORY:  Chest trauma, blunt;    TECHNIQUE:  Low dose axial images, sagittal and coronal reformations were obtained from the thoracic inlet to the pubic symphysis .  Oral contrast was not administered.    COMPARISON:  CT of the chest performed April 10, 2023    FINDINGS:  Vascular and parenchymal evaluation are limited due to the lack of IV contrast media.    Chest: Vascular calcifications are seen including coronary artery calcifications.  There is no evidence of mediastinal or hilar adenopathy.  There is no evidence of a pleural or pericardial effusion.  The ascending and descending thoracic aorta are of normal caliber.  Biapical bleb formation is noted there is a stable 5 mm nodule along the left major fissure.  This again likely represents a Qi fissural lymph node.  In the right middle lobe there is a triangular nodule measuring 9 x 7 mm.  This is slightly less conspicuous than was seen previously.  There is no evidence of new pulmonary nodule or pneumothorax.  There is an intramuscular lipoma in the left posterior showed ule shoulder musculature.  On series number 4, image number 308 there is a right middle lobe stable solid nodule.  Given the lack of change relative to 2023 no additional follow-up is advised.    Abdomen and pelvis: Noncontrast evaluation of the liver, spleen, adrenal glands are unremarkable.  Multiple  gallstones are seen within the gallbladder.  There is a fat containing umbilical hernia.  There is no evidence of free fluid.  There is some increased density inferior to the pancreatic head and about the 3rd duodenum.  This may relate to local inflammation such as pancreatitis or duodenitis.  Follow-up scanning is advised.  I do not see evidence of abdominal wall bruising or free air to suggest this relates to the reported recent trauma but clinical correlation is advised.  Degenerative changes are seen in the lumbar region and about the hips.                                       Medications   HYDROcodone-acetaminophen 5-325 mg per tablet 1 tablet (1 tablet Oral Given 1/11/24 1805)   pantoprazole EC tablet 40 mg (40 mg Oral Given 1/11/24 2033)   methocarbamoL tablet 1,000 mg (1,000 mg Oral Given 1/11/24 2033)     Medical Decision Making  Differential diagnosis include rib fracture, rib contusion, costochondritis, mechanical fall, cholecystitis, pancreatitis, stomach ulcer    CBC and CMP revealed no acute metabolic derangement.  CT chest abdomen pelvis has the following impression per radiologist--  Retroperitoneal soft tissue stranding about pancreas and 3rd duodenum the appearance is more suggestive of pancreatitis or duodenitis rather than related to the recent trauma.  Clinical correlation is advised.   Cholelithiasis.   Stable pulmonary nodules.   Vascular calcifications.   Lipase within normal limits lowering suspicion for pancreatitis.  Patient denies any alcohol abuse.  Patient's symptoms are consistent with duodenitis/duodenal ulcer.  Will treat patient with Protonix.  Will treat pain with Percocet.  Patient instructed to follow-up with GI provider in the next month.  [facility] within [facility] system for patient.  Patient stable at time of discharge in no acute distress.  No life-threatening illnesses were found during ER visit today.  Patient was instructed to follow-up with PCP or  other recommended specialist within the next 48-72 hours.  Patient was instructed to return to ER immediately for any worsening or concerning symptoms.  All discharge instructions discussed with patient, and patient agrees to comply with discharge instructions given today.         Amount and/or Complexity of Data Reviewed  Labs: ordered.  Radiology: ordered.    Risk  Prescription drug management.                                      Clinical Impression:  Final diagnoses:  [K29.80] Duodenitis (Primary)  [W19.XXXA] Fall, initial encounter          ED Disposition Condition    Discharge Stable          ED Prescriptions       Medication Sig Dispense Start Date End Date Auth. Provider    pantoprazole (PROTONIX) 40 MG tablet Take 1 tablet (40 mg total) by mouth once daily. 30 tablet 1/11/2024 2/10/2024 Vickey Herrera NP    HYDROcodone-acetaminophen (NORCO) 7.5-325 mg per tablet Take 1 tablet by mouth every 8 (eight) hours as needed for Pain. 18 tablet 1/11/2024 -- Vickey Herrera NP          Follow-up Information       Follow up With Specialties Details Why Contact Info    Jeimy Treviño NP Internal Medicine In 1 week  [address] [place] [id number]  [phone]               Vickey Herrera NP  01/11/24 [id number]

## 2024-04-12 ENCOUNTER — TELEPHONE (OUTPATIENT)
Dept: INTERNAL MEDICINE | Facility: CLINIC | Age: 69
End: 2024-04-12
Payer: MEDICARE

## 2024-04-12 NOTE — TELEPHONE ENCOUNTER
Patient did not answer I had to call Sofia patient's significant other, and let her know that patient needs to have fasting labs and urine given at [facility] before coming for his 10:20 AM appointment. She said she would let patient know.

## 2024-04-17 DIAGNOSIS — E11.9 TYPE 2 DIABETES MELLITUS WITHOUT COMPLICATION, UNSPECIFIED WHETHER LONG TERM INSULIN USE: ICD-10-CM

## 2024-05-02 ENCOUNTER — OFFICE VISIT (OUTPATIENT)
Dept: INTERNAL MEDICINE | Facility: CLINIC | Age: 69
End: 2024-05-02
Payer: MEDICARE

## 2024-05-02 ENCOUNTER — TELEPHONE (OUTPATIENT)
Dept: INTERNAL MEDICINE | Facility: CLINIC | Age: 69
End: 2024-05-02
Payer: MEDICARE

## 2024-05-02 DIAGNOSIS — J44.9 CHRONIC OBSTRUCTIVE PULMONARY DISEASE, UNSPECIFIED COPD TYPE: ICD-10-CM

## 2024-05-02 DIAGNOSIS — G57.93 NEUROPATHY OF BOTH FEET: ICD-10-CM

## 2024-05-02 DIAGNOSIS — I70.0 AORTIC ATHEROSCLEROSIS: ICD-10-CM

## 2024-05-02 DIAGNOSIS — N40.0 BENIGN PROSTATIC HYPERPLASIA WITHOUT LOWER URINARY TRACT SYMPTOMS: ICD-10-CM

## 2024-05-02 DIAGNOSIS — I10 PRIMARY HYPERTENSION: Primary | ICD-10-CM

## 2024-05-02 DIAGNOSIS — E11.9 TYPE 2 DIABETES MELLITUS WITHOUT COMPLICATION, WITHOUT LONG-TERM CURRENT USE OF INSULIN: ICD-10-CM

## 2024-05-02 PROCEDURE — 1160F RVW MEDS BY RX/DR IN RCRD: CPT | Mod: CPTII,95,, | Performed by: NURSE PRACTITIONER

## 2024-05-02 PROCEDURE — 1159F MED LIST DOCD IN RCRD: CPT | Mod: CPTII,95,, | Performed by: NURSE PRACTITIONER

## 2024-05-02 PROCEDURE — 4010F ACE/ARB THERAPY RXD/TAKEN: CPT | Mod: CPTII,95,, | Performed by: NURSE PRACTITIONER

## 2024-05-02 PROCEDURE — 99213 OFFICE O/P EST LOW 20 MIN: CPT | Mod: 95,,, | Performed by: NURSE PRACTITIONER

## 2024-05-02 NOTE — TELEPHONE ENCOUNTER
I called patient to let him know he is scheduled for fasting labs Friday 5/10/2024 at 8:45 AM in the Internal Medicine Clinic and also a Nurse Visit Friday 5/10/2024 to check his blood pressure and weight. Patient voiced understanding.

## 2024-05-06 DIAGNOSIS — G57.93 NEUROPATHY OF BOTH FEET: ICD-10-CM

## 2024-05-06 DIAGNOSIS — I10 PRIMARY HYPERTENSION: ICD-10-CM

## 2024-05-06 DIAGNOSIS — J41.1 MUCOPURULENT CHRONIC BRONCHITIS: ICD-10-CM

## 2024-05-06 DIAGNOSIS — J44.9 CHRONIC OBSTRUCTIVE PULMONARY DISEASE, UNSPECIFIED COPD TYPE: ICD-10-CM

## 2024-05-06 DIAGNOSIS — N40.0 BENIGN PROSTATIC HYPERPLASIA WITHOUT LOWER URINARY TRACT SYMPTOMS: ICD-10-CM

## 2024-05-07 DIAGNOSIS — J44.9 CHRONIC OBSTRUCTIVE PULMONARY DISEASE, UNSPECIFIED COPD TYPE: ICD-10-CM

## 2024-05-07 RX ORDER — ROSUVASTATIN CALCIUM 20 MG/1
TABLET, COATED ORAL
Qty: 90 TABLET | Refills: 1 | Status: SHIPPED | OUTPATIENT
Start: 2024-05-07 | End: 2024-05-10 | Stop reason: SDUPTHER

## 2024-05-07 RX ORDER — ALBUTEROL SULFATE 90 UG/1
2 AEROSOL, METERED RESPIRATORY (INHALATION) EVERY 6 HOURS PRN
Qty: 18 G | Refills: 5 | Status: SHIPPED | OUTPATIENT
Start: 2024-05-07 | End: 2025-05-07

## 2024-05-07 RX ORDER — GABAPENTIN 800 MG/1
1200 TABLET ORAL 2 TIMES DAILY
Qty: 270 TABLET | Refills: 1 | Status: SHIPPED | OUTPATIENT
Start: 2024-05-07 | End: 2024-05-10 | Stop reason: SDUPTHER

## 2024-05-07 RX ORDER — FLUTICASONE PROPIONATE AND SALMETEROL XINAFOATE 115; 21 UG/1; UG/1
2 AEROSOL, METERED RESPIRATORY (INHALATION) EVERY 12 HOURS
Qty: 12 G | Refills: 3 | Status: SHIPPED | OUTPATIENT
Start: 2024-05-07 | End: 2024-05-08

## 2024-05-07 RX ORDER — IRBESARTAN 150 MG/1
150 TABLET ORAL DAILY
Qty: 90 TABLET | Refills: 1 | Status: SHIPPED | OUTPATIENT
Start: 2024-05-07 | End: 2024-05-10 | Stop reason: SDUPTHER

## 2024-05-07 RX ORDER — FINASTERIDE 5 MG/1
5 TABLET, FILM COATED ORAL DAILY
Qty: 90 TABLET | Refills: 3 | Status: SHIPPED | OUTPATIENT
Start: 2024-05-07

## 2024-05-07 NOTE — PROGRESS NOTES
Subjective:           Patient ID: Javad Coughlin is a 68 y.o. male.    Chief Complaint: No chief complaint on file.    Javad Coughlin is a 68 y.o. male known to me ; known Type II DM, COPD, ANTONI, HTN, ^CHOl, BPH, neuropathy, psoriasis     Noted ED visit in January ; Dx duodenitis       The patient location is: home   The chief complaint leading to consultation is: 6m f/u - he is due for labs  No preceding labs     Visit type: audiovisual    Face to Face time with patient: 15 minutes of total time spent on the encounter, which includes face to face time and non-face to face time preparing to see the patient (eg, review of tests), Obtaining and/or reviewing separately obtained history, Documenting clinical information in the electronic or other health record, Independently interpreting results (not separately reported) and communicating results to the patient/family/caregiver, or Care coordination (not separately reported).         Each patient to whom he or she provides medical services by telemedicine is:  (1) informed of the relationship between the physician and patient and the respective role of any other health care provider with respect to management of the patient; and (2) notified that he or she may decline to receive medical services by telemedicine and may withdraw from such care at any time.    Notes:     He denies any abdominal pain   Has been trying to increase exercise  Rare alcohol   Quit smoking  8 years ago   Does not check BP at home   Will have him come in for labs and BP check      Review of Systems   Constitutional:  Negative for activity change and unexpected weight change.   HENT:  Negative for hearing loss, rhinorrhea and trouble swallowing.    Eyes:  Negative for discharge and visual disturbance.   Respiratory:  Negative for chest tightness and wheezing.    Cardiovascular:  Negative for chest pain and palpitations.   Gastrointestinal:  Negative for blood in stool, constipation,  "diarrhea and vomiting.   Endocrine: Negative for polydipsia and polyuria.   Genitourinary:  Negative for difficulty urinating, hematuria and urgency.   Musculoskeletal:  Positive for arthralgias. Negative for joint swelling and neck pain.   Neurological:  Negative for weakness and headaches.   Psychiatric/Behavioral:  Negative for confusion and dysphoric mood.        Objective:      Physical Exam  Constitutional:       Appearance: Normal appearance.   HENT:      Head: Normocephalic.   Pulmonary:      Effort: No respiratory distress.   Neurological:      Mental Status: He is alert.         Assessment:       1. Primary hypertension    2. Aortic atherosclerosis    3. Type 2 diabetes mellitus without complication, without long-term current use of insulin    4. Neuropathy of both feet    5. Chronic obstructive pulmonary disease, unspecified COPD type    6. Benign prostatic hyperplasia without lower urinary tract symptoms        Plan:   1. Primary hypertension  Overview:  12/21/22 Dx approx 10 yrs ago   Dx - 10yrs,  " off and on" , irbesartan - 150mg Rx    2/8/23 cardiology evaluation with Dr. Segura   Primary hypertension - Primary        Aldactone 25 mg added to ARB  for better blood pressure control an attempt at improvement in shortness of breath.  There could be a component of diastolic heart failure.           Irbesartan 150mg daily  Aldactone 25mg po daily   Cardiology records noted;     Assessment & Plan:  BP check today -   High in ED   Trying to lose weight  Denies any more chest pain       2. Aortic atherosclerosis  Overview:  12/22/22 CXR- aortic arch calcifications  Rosuvastatin 20mg   Ecotrin     Assessment & Plan:  Stable with rx  Lipids due       3. Type 2 diabetes mellitus without complication, without long-term current use of insulin  Overview:  12/21/22 Prior Dx 3 yrs ago; tx with Ozempic- was getting samples   Type 2 diabetes mellitus without complications  diet  Losing weight   Metformin ER 500mg " daily   Notes he was 318 down to 301  12/22/22 urine MA normal   ARB  Statin     Assessment & Plan:  Lab Results   Component Value Date    HGBA1C 6.9 (H) 08/22/2023     Has not weighed in lately       4. Neuropathy of both feet  Overview:  1/23 Gabapentin for neuropathy- bilateral feet ; dx 4- 5 yrs ago   800mg tid- takes 1.5 in am and 1.5 at night       5. Chronic obstructive pulmonary disease, unspecified COPD type  Overview:  Notes emphysema, quits smoking years ago, 4 years   Had PFTs years ago   Repeat PFTs- 12/23/22 showing Moderate obstruction.      2/14/23 Per pulmonary Patient with obstructive lung disease and decreased diffusion concerning for COPD with typical symptoms. picture is consistent with COPD (asthmatic variant vs emphysema).    He has not been on a controller medication, only rescue inhalers and is GOLD B. Will recommends starting LABA/ICS due to the reversibility associated with PFT in Dec 2022. We will plan for continuing a controller medication for at least six weeks and follow up with repeat PFTs, CT Chest.  CT for lung cancer screening (in addition to evaluating for asbestos risk).   - Advair 250 BID prescribed  Ventolin 2 p q6 prn   - follow up in 6-8 weeks    - PFTs w/ bronchodilator    - CT Chest non-contrast    Assessment & Plan:  Still has occasional cough;   No recent SOB or fever  Quit smoking around 8 years ago       6. Benign prostatic hyperplasia without lower urinary tract symptoms  Overview:  Established with Dx  cialis 5mg daily   Finasteride 5mg daily     Assessment & Plan:  Stable with Rx          Labs and BP check   If stable F/ U in 6m  Having transportation issues

## 2024-05-08 RX ORDER — FLUTICASONE PROPIONATE AND SALMETEROL 250; 50 UG/1; UG/1
1 POWDER RESPIRATORY (INHALATION) EVERY 12 HOURS
Qty: 60 EACH | Refills: 5 | Status: SHIPPED | OUTPATIENT
Start: 2024-05-08

## 2024-05-10 ENCOUNTER — LAB VISIT (OUTPATIENT)
Dept: INTERNAL MEDICINE | Facility: CLINIC | Age: 69
End: 2024-05-10
Payer: MEDICARE

## 2024-05-10 ENCOUNTER — CLINICAL SUPPORT (OUTPATIENT)
Dept: INTERNAL MEDICINE | Facility: CLINIC | Age: 69
End: 2024-05-10
Payer: MEDICARE

## 2024-05-10 VITALS — HEART RATE: 86 BPM | DIASTOLIC BLOOD PRESSURE: 88 MMHG | OXYGEN SATURATION: 97 % | SYSTOLIC BLOOD PRESSURE: 130 MMHG

## 2024-05-10 DIAGNOSIS — I10 PRIMARY HYPERTENSION: ICD-10-CM

## 2024-05-10 DIAGNOSIS — G57.93 NEUROPATHY OF BOTH FEET: ICD-10-CM

## 2024-05-10 DIAGNOSIS — E11.9 TYPE 2 DIABETES MELLITUS WITHOUT COMPLICATION, WITHOUT LONG-TERM CURRENT USE OF INSULIN: ICD-10-CM

## 2024-05-10 DIAGNOSIS — I10 PRIMARY HYPERTENSION: Primary | ICD-10-CM

## 2024-05-10 LAB
ALBUMIN SERPL BCP-MCNC: 3.8 G/DL (ref 3.5–5.2)
ALBUMIN/CREAT UR: 6 UG/MG (ref 0–30)
ALP SERPL-CCNC: 77 U/L (ref 55–135)
ALT SERPL W/O P-5'-P-CCNC: 31 U/L (ref 10–44)
ANION GAP SERPL CALC-SCNC: 10 MMOL/L (ref 8–16)
AST SERPL-CCNC: 30 U/L (ref 10–40)
BILIRUB SERPL-MCNC: 0.6 MG/DL (ref 0.1–1)
BUN SERPL-MCNC: 18 MG/DL (ref 8–23)
CALCIUM SERPL-MCNC: 9.4 MG/DL (ref 8.7–10.5)
CHLORIDE SERPL-SCNC: 103 MMOL/L (ref 95–110)
CHOLEST SERPL-MCNC: 232 MG/DL (ref 120–199)
CHOLEST/HDLC SERPL: 7 {RATIO} (ref 2–5)
CO2 SERPL-SCNC: 25 MMOL/L (ref 23–29)
CREAT SERPL-MCNC: 1.3 MG/DL (ref 0.5–1.4)
CREAT UR-MCNC: 363.9 MG/DL (ref 23–375)
EST. GFR  (NO RACE VARIABLE): >60 ML/MIN/1.73 M^2
ESTIMATED AVG GLUCOSE: 146 MG/DL (ref 68–131)
GLUCOSE SERPL-MCNC: 124 MG/DL (ref 70–110)
HBA1C MFR BLD: 6.7 % (ref 4–5.6)
HDLC SERPL-MCNC: 33 MG/DL (ref 40–75)
HDLC SERPL: 14.2 % (ref 20–50)
LDLC SERPL CALC-MCNC: 161 MG/DL (ref 63–159)
MICROALBUMIN UR DL<=1MG/L-MCNC: 22 UG/ML
NONHDLC SERPL-MCNC: 199 MG/DL
POTASSIUM SERPL-SCNC: 4.4 MMOL/L (ref 3.5–5.1)
PROT SERPL-MCNC: 7 G/DL (ref 6–8.4)
SODIUM SERPL-SCNC: 138 MMOL/L (ref 136–145)
TRIGL SERPL-MCNC: 190 MG/DL (ref 30–150)

## 2024-05-10 PROCEDURE — 99999 PR PBB SHADOW E&M-EST. PATIENT-LVL I: CPT | Mod: PBBFAC,,,

## 2024-05-10 PROCEDURE — 82043 UR ALBUMIN QUANTITATIVE: CPT | Performed by: NURSE PRACTITIONER

## 2024-05-10 PROCEDURE — 80061 LIPID PANEL: CPT | Performed by: NURSE PRACTITIONER

## 2024-05-10 PROCEDURE — 36415 COLL VENOUS BLD VENIPUNCTURE: CPT | Performed by: NURSE PRACTITIONER

## 2024-05-10 PROCEDURE — 36415 COLL VENOUS BLD VENIPUNCTURE: CPT | Mod: S$GLB,,, | Performed by: NURSE PRACTITIONER

## 2024-05-10 PROCEDURE — 80053 COMPREHEN METABOLIC PANEL: CPT | Performed by: NURSE PRACTITIONER

## 2024-05-10 PROCEDURE — 83036 HEMOGLOBIN GLYCOSYLATED A1C: CPT | Performed by: NURSE PRACTITIONER

## 2024-05-10 NOTE — PROGRESS NOTES
Pt presents to clinic for BP check pressure in clinic todauy 130/88 stated he had not taking his BP meds yet pulse 86 O2 97.

## 2024-05-14 DIAGNOSIS — Z12.5 SCREENING FOR PROSTATE CANCER: ICD-10-CM

## 2024-05-14 DIAGNOSIS — E11.9 TYPE 2 DIABETES MELLITUS WITHOUT COMPLICATION, WITHOUT LONG-TERM CURRENT USE OF INSULIN: ICD-10-CM

## 2024-05-14 DIAGNOSIS — I10 PRIMARY HYPERTENSION: Primary | ICD-10-CM

## 2024-05-14 RX ORDER — IRBESARTAN 150 MG/1
150 TABLET ORAL DAILY
Qty: 90 TABLET | Refills: 1 | Status: SHIPPED | OUTPATIENT
Start: 2024-05-14

## 2024-05-14 RX ORDER — SPIRONOLACTONE 25 MG/1
25 TABLET ORAL DAILY
Qty: 90 TABLET | Refills: 1 | Status: SHIPPED | OUTPATIENT
Start: 2024-05-14 | End: 2025-05-14

## 2024-05-14 RX ORDER — ROSUVASTATIN CALCIUM 20 MG/1
TABLET, COATED ORAL
Qty: 90 TABLET | Refills: 1 | Status: SHIPPED | OUTPATIENT
Start: 2024-05-14

## 2024-05-14 RX ORDER — GABAPENTIN 800 MG/1
1200 TABLET ORAL 2 TIMES DAILY
Qty: 270 TABLET | Refills: 1 | Status: SHIPPED | OUTPATIENT
Start: 2024-05-14

## 2024-05-14 NOTE — PROGRESS NOTES
Please call the patient regarding his abnormal result.   All labs are good except for his lipids are not at goal ; they were much better last check 8 months ago  Please see if he stopped taking his crestor?   Will need repeat labs in 6m before f/u appnt in 6m; CMP, lipids, HGB A1C, PSA; will place orders

## 2024-05-30 ENCOUNTER — TELEPHONE (OUTPATIENT)
Dept: INTERNAL MEDICINE | Facility: CLINIC | Age: 69
End: 2024-05-30
Payer: MEDICARE

## 2024-05-30 DIAGNOSIS — E11.9 TYPE 2 DIABETES MELLITUS WITHOUT COMPLICATION, UNSPECIFIED WHETHER LONG TERM INSULIN USE: ICD-10-CM

## 2024-05-30 DIAGNOSIS — Z12.5 SCREENING FOR PROSTATE CANCER: ICD-10-CM

## 2024-05-30 DIAGNOSIS — E78.00 PURE HYPERCHOLESTEROLEMIA: ICD-10-CM

## 2024-05-30 DIAGNOSIS — I10 PRIMARY HYPERTENSION: Primary | ICD-10-CM

## 2024-05-30 NOTE — TELEPHONE ENCOUNTER
----- Message from Kanchan Posada sent at 2024 12:19 PM CDT -----  Contact: Pt  Javad Coughlin  MRN: [id number]  : 1955  PCP: Jeimy Treviño  Home Phone      Not on file.  Work Phone      Not on file.  Mobile          [phone]      MESSAGE:     Pt returned call about lab results.       Please advise   [phone]

## 2024-06-17 ENCOUNTER — PATIENT OUTREACH (OUTPATIENT)
Dept: ADMINISTRATIVE | Facility: HOSPITAL | Age: 69
End: 2024-06-17
Payer: MEDICARE

## 2024-06-17 DIAGNOSIS — E11.9 TYPE 2 DIABETES MELLITUS WITHOUT COMPLICATION, WITHOUT LONG-TERM CURRENT USE OF INSULIN: Primary | ICD-10-CM

## 2024-06-17 NOTE — PROGRESS NOTES
Chart reviewed, immunization record updated.  No new results noted on [facility] or [facility] web site.  Care Everywhere updated.   Patient care coordination note  Upcoming PCP visit updated.  Next PCP visit 11/06/2024.  Patient replied to email campaign via portal.  Contacted patient, discussed eye exam, patient approved to have referral placed for Dr. Rivas.  Referral for Dr. Carmen Rivas placed, in basket message sent to Dr. Rivas staff to contact patient to discuss scheduling routine diabetic eye exam.

## 2024-07-20 ENCOUNTER — E-VISIT (OUTPATIENT)
Dept: INTERNAL MEDICINE | Facility: CLINIC | Age: 69
End: 2024-07-20
Payer: MEDICARE

## 2024-07-20 ENCOUNTER — HOSPITAL ENCOUNTER (EMERGENCY)
Facility: HOSPITAL | Age: 69
Discharge: HOME | End: 2024-07-20
Attending: STUDENT IN AN ORGANIZED HEALTH CARE EDUCATION/TRAINING PROGRAM
Payer: MEDICARE

## 2024-07-20 VITALS
BODY MASS INDEX: 40.87 KG/M2 | WEIGHT: 301.38 LBS | HEART RATE: 85 BPM | RESPIRATION RATE: 18 BRPM | DIASTOLIC BLOOD PRESSURE: 77 MMHG | TEMPERATURE: 98 F | OXYGEN SATURATION: 99 % | SYSTOLIC BLOOD PRESSURE: 144 MMHG

## 2024-07-20 DIAGNOSIS — N32.89 BLADDER MASS: Primary | ICD-10-CM

## 2024-07-20 DIAGNOSIS — N40.0 BENIGN PROSTATIC HYPERPLASIA WITHOUT LOWER URINARY TRACT SYMPTOMS: ICD-10-CM

## 2024-07-20 DIAGNOSIS — R31.9 HEMATURIA, UNSPECIFIED TYPE: ICD-10-CM

## 2024-07-20 LAB
BACTERIA #/AREA URNS HPF: ABNORMAL /HPF
BILIRUB UR QL STRIP: NEGATIVE
CLARITY UR: ABNORMAL
COLOR UR: ABNORMAL
GLUCOSE UR QL STRIP: NEGATIVE
HGB UR QL STRIP: ABNORMAL
HYALINE CASTS #/AREA URNS LPF: 0 /LPF
KETONES UR QL STRIP: NEGATIVE
LEUKOCYTE ESTERASE UR QL STRIP: ABNORMAL
MICROSCOPIC COMMENT: ABNORMAL
NITRITE UR QL STRIP: NEGATIVE
PH UR STRIP: 7 [PH] (ref 5–8)
PROT UR QL STRIP: ABNORMAL
RBC #/AREA URNS HPF: >100 /HPF (ref 0–4)
SP GR UR STRIP: 1.02 (ref 1–1.03)
URN SPEC COLLECT METH UR: ABNORMAL
UROBILINOGEN UR STRIP-ACNC: NEGATIVE EU/DL
WBC #/AREA URNS HPF: 2 /HPF (ref 0–5)

## 2024-07-20 PROCEDURE — 81000 URINALYSIS NONAUTO W/SCOPE: CPT | Performed by: STUDENT IN AN ORGANIZED HEALTH CARE EDUCATION/TRAINING PROGRAM

## 2024-07-20 PROCEDURE — 96372 THER/PROPH/DIAG INJ SC/IM: CPT | Performed by: STUDENT IN AN ORGANIZED HEALTH CARE EDUCATION/TRAINING PROGRAM

## 2024-07-20 PROCEDURE — 99285 EMERGENCY DEPT VISIT HI MDM: CPT | Mod: 25

## 2024-07-20 PROCEDURE — 63600175 PHARM REV CODE 636 W HCPCS: Performed by: STUDENT IN AN ORGANIZED HEALTH CARE EDUCATION/TRAINING PROGRAM

## 2024-07-20 RX ORDER — TAMSULOSIN HYDROCHLORIDE 0.4 MG/1
0.4 CAPSULE ORAL DAILY
Qty: 10 CAPSULE | Refills: 0 | Status: SHIPPED | OUTPATIENT
Start: 2024-07-20 | End: 2025-07-20

## 2024-07-20 RX ORDER — KETOROLAC TROMETHAMINE 30 MG/ML
15 INJECTION, SOLUTION INTRAMUSCULAR; INTRAVENOUS
Status: COMPLETED | OUTPATIENT
Start: 2024-07-20 | End: 2024-07-20

## 2024-07-20 RX ADMIN — KETOROLAC TROMETHAMINE 15 MG: 30 INJECTION, SOLUTION INTRAMUSCULAR at 03:07

## 2024-07-20 NOTE — ED TRIAGE NOTES
69 y.o. male presents to ER Room/bed info not found   Chief Complaint   Patient presents with    Hematuria   .   C/o blood in urine and mid lower back pain since this am

## 2024-07-20 NOTE — ED PROVIDER NOTES
Encounter Date: 2024       History     Chief Complaint   Patient presents with    Hematuria     69-year-old male with diabetes, hypertension, COPD, presenting with hematuria and left flank pain that began yesterday.  Bleeding worsened today.  No dysuria.  No fever, nausea, vomiting, diarrhea.  No other complaints.      Review of patient's allergies indicates:  No Known Allergies  Past Medical History:   Diagnosis Date    COPD (chronic obstructive pulmonary disease)     Diabetes mellitus, type 2     Eczema     Plaque psoriasis     Rosacea      Past Surgical History:   Procedure Laterality Date    ADENOIDECTOMY      SKIN GRAFT      SPINE SURGERY      TONSILLECTOMY       Family History   Problem Relation Name Age of Onset    Cancer Mother Samia Coughlin     Parkinsonism Sister Sadia Eastman     Diabetes Sister Sadia Eastman     Alcohol abuse Brother Hiram Coughlin         Quit Drinking and Smoking    Asbestos Brother Hiram Coughlin     Parkinsonism Brother Hiram Coughlin      Social History     Tobacco Use    Smoking status: Former     Current packs/day: 0.00     Types: Cigarettes     Quit date: 2019     Years since quittin.0     Passive exposure: Never    Smokeless tobacco: Never    Tobacco comments:     1-2 packs a day   Substance Use Topics    Alcohol use: Not Currently    Drug use: Never     Review of Systems   Constitutional:  Negative for fever.   HENT:  Negative for sore throat.    Respiratory:  Negative for shortness of breath.    Cardiovascular:  Negative for chest pain.   Gastrointestinal:  Negative for nausea.   Genitourinary:  Positive for flank pain and hematuria. Negative for dysuria.   Musculoskeletal:  Negative for back pain.   Skin:  Negative for rash.   Neurological:  Negative for weakness.   Hematological:  Does not bruise/bleed easily.       Physical Exam     Initial Vitals [24 1531]   BP Pulse Resp Temp SpO2   (!) 164/77 85 20 97.8 °F (36.6 °C) 95 %      MAP       --          Physical Exam    Nursing note and vitals reviewed.  Constitutional: He appears well-developed.   Eyes: EOM are normal.   Neck:   Normal range of motion.  Cardiovascular:            No murmur heard.  Pulmonary/Chest: No respiratory distress.   Abdominal: He exhibits no distension.   No abdominal tenderness to palpation.  No rebound or guarding.  Positive left CVA tenderness.  No right CVA tenderness.   Musculoskeletal:         General: Normal range of motion.      Cervical back: Normal range of motion.     Neurological: He is alert.   Skin: Skin is warm.   Psychiatric: He has a normal mood and affect.         ED Course   Procedures  Labs Reviewed   URINALYSIS, REFLEX TO URINE CULTURE - Abnormal       Result Value    Specimen UA Urine, Clean Catch      Color, UA Red (*)     Appearance, UA Hazy (*)     pH, UA 7.0      Specific Gravity, UA 1.020      Protein, UA 2+ (*)     Glucose, UA Negative      Ketones, UA Negative      Bilirubin (UA) Negative      Occult Blood UA 3+ (*)     Nitrite, UA Negative      Urobilinogen, UA Negative      Leukocytes, UA Trace (*)     Narrative:     Specimen Source->Urine   URINALYSIS MICROSCOPIC - Abnormal    RBC, UA >100 (*)     WBC, UA 2      Bacteria Occasional      Hyaline Casts, UA 0      Microscopic Comment SEE COMMENT      Narrative:     Specimen Source->Urine          Imaging Results              CT Renal Stone Study ABD Pelvis WO (Final result)  Result time 07/20/24 16:22:22      Final result by Anil Leon Jr., MD (07/20/24 16:22:22)                   Impression:      2.6 cm mass in the right posterior bladder wall with neoplasm suspected.  Cholelithiasis.  Two small cyst of the left kidney without hydronephrosis.  Diverticulosis coli.      Electronically signed by: Anil Leon MD  Date:    07/20/2024  Time:    16:22               Narrative:    EXAMINATION:  CT RENAL STONE STUDY ABD PELVIS WO    CLINICAL HISTORY:  Flank pain, kidney stone  suspected;    TECHNIQUE:  Low dose axial images, sagittal and coronal reformations were obtained from the lung bases to the pubic symphysis.  Contrast was not administered.    COMPARISON:  CT chest of January 11, 2024    FINDINGS:  The liver is of normal size contour and CT density without focal defect.  The gallbladder is of normal size but contains multiple gallstones.  Edema or thickening of the gallbladder wall is not seen.  The pancreas is of normal contour and CT density without edema or mass.  The spleen is of normal size and CT density.    The adrenal glands are not enlarged.  The kidneys are of normal size contour and CT density for a noncontrast study.  There is a small cyst identified of the left kidney measuring 1.4 cm.  And a small cyst at the lower pole of the left kidney measuring 1.4 cm.  Stone or hydronephrosis is not seen on either side.  The abdominal aorta and inferior vena cava are of normal caliber.    The stomach is of normal configuration.  Small bowel dilatation or air-fluid levels are not seen.  There is diverticulosis noted in the descending and sigmoid colon without CT evidence of diverticulitis.  A normal appendix is noted.  Free fluid or free air is not seen.    The bladder is of normal size however there is a soft tissue density mass in the right side of the bladder measuring 2.6 cm.  A neoplasm is suspected.  The prostate is not enlarged.                                       Medications   ketorolac injection 15 mg (15 mg Intramuscular Given 7/20/24 1541)     Medical Decision Making  DDX: Possible kidney stone. R/o UTI/pyelonephritis, infected stone. Less likely appendicitis/diverticulitis given benign abdomen, but will be screened. Otherwise likely muscular strain.  DX: UA. CTAP w/o contrast. Consider labs if large stone to assess for DENNY.  TX: Analgesia, antiemetic PRN. IVF. Treatment/consult as indicated by studies.  DISPO: Pending studies, reassessment. If studies WNL or stable  for outpatient management, symptoms controlled, discharge to follow up with PMD +/- urology within 1 week with supportive care recommendations and RTED precautions.        Amount and/or Complexity of Data Reviewed  Radiology: ordered.    Risk  Prescription drug management.               ED Course as of 07/20/24 1638   Sat Jul 20, 2024   1638 Will prescribe Flomax to reduce the risk of clots causing urinary obstruction. [NB]      ED Course User Index  [NB] Jim Garcia MD                           Clinical Impression:  Final diagnoses:  [R31.9] Hematuria, unspecified type  [N32.89] Bladder mass (Primary)          ED Disposition Condition    Discharge Stable          ED Prescriptions       Medication Sig Dispense Start Date End Date Auth. Provider    tamsulosin (FLOMAX) 0.4 mg Cap Take 1 capsule (0.4 mg total) by mouth once daily. 10 capsule 7/20/2024 7/20/2025 Jim Garcia MD          Follow-up Information       Follow up With Specialties Details Why Contact Info    Alex Hood MD Urology Schedule an appointment as soon as possible for a visit in 2 days  [address] [facility] [address]  [phone]      Jeimy Treviño NP Internal Medicine Schedule an appointment as soon as possible for a visit in 2 days  [address] [place] [id number]  [phone]      [facility] - Emergency Dept Emergency Medicine  If symptoms worsen [address] [place] [id number]  [phone]             Jim Garcia MD  07/20/24 1537       Jim Garcia MD  07/20/24 1638

## 2024-07-22 PROCEDURE — 99421 OL DIG E/M SVC 5-10 MIN: CPT | Mod: ,,, | Performed by: NURSE PRACTITIONER

## 2024-07-23 NOTE — PROGRESS NOTES
[facility] Note    Chief Complaint      Chief Complaint   Patient presents with    Urinary Tract Infection     Entered automatically based on patient selection in [place].     History of Present Illness      Javad Coughlin is a 69 y.o. male who presents today ***.  Patient comes to appointment ***.     Problem List Items Addressed This Visit    None      Health Maintenance   Topic Date Due    Foot Exam  2024    Eye Exam  2024    Shingles Vaccine (2 of 2) 2024    Hemoglobin A1c  2024    Lipid Panel  2025    Colorectal Cancer Screening  2025    TETANUS VACCINE  2033    Hepatitis C Screening  Completed    Abdominal Aortic Aneurysm Screening  Completed       Past Medical History:   Diagnosis Date    COPD (chronic obstructive pulmonary disease)     Diabetes mellitus, type 2     Eczema     Plaque psoriasis     Rosacea        Past Surgical History:   Procedure Laterality Date    ADENOIDECTOMY      SKIN GRAFT      SPINE SURGERY      TONSILLECTOMY         family history includes Alcohol abuse in his brother; Asbestos in his brother; Cancer in his mother; Diabetes in his sister; Parkinsonism in his brother and sister.    Social History     Tobacco Use    Smoking status: Former     Current packs/day: 0.00     Types: Cigarettes     Quit date: 2019     Years since quittin.0     Passive exposure: Never    Smokeless tobacco: Never    Tobacco comments:     1-2 packs a day   Substance Use Topics    Alcohol use: Not Currently    Drug use: Never       HPI    Review of Systems     Outpatient Encounter Medications as of 2024   Medication Sig Dispense Refill    albuterol (PROVENTIL/VENTOLIN HFA) 90 mcg/actuation inhaler Inhale 2 puffs into the lungs every 6 (six) hours as needed for Wheezing or Shortness of Breath (cough and wheezing). 18 g 5    clobetasoL (TEMOVATE) 0.05 % external solution       finasteride (PROSCAR) 5 mg tablet Take 1  "tablet (5 mg total) by mouth once daily. 90 tablet 3    fluticasone-salmeterol diskus inhaler 250-50 mcg Inhale 1 puff into the lungs every 12 (twelve) hours. 60 each 5    gabapentin (NEURONTIN) 800 MG tablet Take 1.5 tablets (1,200 mg total) by mouth 2 (two) times daily. 270 tablet 1    HYDROcodone-acetaminophen (NORCO) 7.5-325 mg per tablet Take 1 tablet by mouth every 8 (eight) hours as needed for Pain. 18 tablet 0    irbesartan (AVAPRO) 150 MG tablet Take 1 tablet (150 mg total) by mouth once daily. irbesartan 150 mg tablet 90 tablet 1    ketoconazole (NIZORAL) 2 % shampoo       metFORMIN (GLUCOPHAGE-XR) 500 MG ER 24hr tablet Take 1 tablet (500 mg total) by mouth 2 (two) times daily with meals. 180 tablet 1    metronidazole 1% (METROGEL) 1 % Gel APPLY TO FACE EVERY MORNING FOR ROSACEA      mometasone 0.1% (ELOCON) 0.1 % cream       pantoprazole (PROTONIX) 40 MG tablet Take 1 tablet (40 mg total) by mouth once daily. 30 tablet 0    QUEtiapine (SEROQUEL) 25 MG Tab Take 25 mg by mouth nightly as needed.      rosuvastatin (CRESTOR) 20 MG tablet Take one tablet by mouth once daily 90 tablet 1    spironolactone (ALDACTONE) 25 MG tablet Take 1 tablet (25 mg total) by mouth once daily. 90 tablet 1    tadalafiL (CIALIS) 5 MG tablet       tamsulosin (FLOMAX) 0.4 mg Cap Take 1 capsule (0.4 mg total) by mouth once daily. 10 capsule 0    TREMFYA 100 mg/mL AtIn Inject into the skin.      [] ketorolac injection 15 mg        No facility-administered encounter medications on file as of 2024.        Review of patient's allergies indicates:  No Known Allergies    Physical Exam       ]    @Physical Exam      Laboratory:  CBC:  No results for input(s): "WBC", "RBC", "HGB", "HCT", "PLT", "MCV", "MCH", "MCHC" in the last 2160 hours.  CMP:  Recent Labs   Lab Result Units 05/10/24  0914 24  0853   Glucose mg/dL 124* 184*   Calcium mg/dL 9.4 9.1   Albumin g/dL 3.8 3.7   Total Protein g/dL 7.0 6.5   Sodium mmol/L 138 " "135*   Potassium mmol/L 4.4 4.4   CO2 mmol/L 25 26   Chloride mmol/L 103 102   BUN mg/dL 18 12   Alkaline Phosphatase U/L 77 64   ALT U/L 31 23   AST U/L 30 27   Total Bilirubin mg/dL 0.6 0.6     URINALYSIS:  Recent Labs   Lab Result Units 07/20/24  1622   Color, UA  Red*   Specific Gravity, UA  1.020   pH, UA  7.0   Protein, UA  2+*   Bacteria /hpf Occasional   Nitrite, UA  Negative   Leukocytes, UA  Trace*   Urobilinogen, UA EU/dL Negative   Hyaline Casts, UA /lpf 0      LIPIDS:  Recent Labs   Lab Result Units 05/10/24  0914 05/29/24  0853   HDL mg/dL 33* 32*   Cholesterol mg/dL 232* 131   Triglycerides mg/dL 190* 198*   LDL Cholesterol mg/dL 161.0* 59.4*   HDL/Cholesterol Ratio % 14.2* 24.4   Non-HDL Cholesterol mg/dL 199 99   Total Cholesterol/HDL Ratio  7.0* 4.1     TSH:  No results for input(s): "TSH" in the last 2160 hours.  A1C:  Recent Labs   Lab Result Units 05/10/24  0914 05/29/24  0853   Hemoglobin A1C % 6.7* 7.0*       Radiology:  ***    Assessment/Plan     Javad Cuoghlin is a 69 y.o.male with:    There are no diagnoses linked to this encounter.    -Continue current medications and maintain follow up with specialists.  Return to clinic in *** months.        Jeimy Treviño MD  [facility] [place]                  "

## 2024-07-23 NOTE — PROGRESS NOTES
Patient ID: Javad Coughlin is a 69 y.o. male.    Chief Complaint: Urinary Tract Infection (Entered automatically based on patient selection in "[employer]".)    The patient initiated a request through "[employer]" on 7/20/2024 for evaluation and management with a chief complaint of Urinary Tract Infection (Entered automatically based on patient selection in "[employer].)     I evaluated the questionnaire submission on 7/22/24.    [facility] Evisit Uti Questionnaire    7/22/2024 12:05 PM CDT - Filed by Patient   Do you agree to participate in an E-Visit? Yes   If you have any of the following symptoms, please present to your local emergency room or call 911:  I acknowledge   What is the main issue you would like addressed today? Follow up with any available urologist in the [facility] area   What symptoms do you currently have? Change in urine appearance or smell   When did your symptoms first appear? 7/20/2024   List what you have done or taken to help your symptoms. [facility] emergency room check in prescribed Tamsulosin HCL 0.4 MG and recommended see a urologist specialist   Please indicate whether you have had the following symptoms during the past 24 hours     Urgent urination (a sudden and uncontrollable urge to urinate) Moderate   Frequent urination of small amounts of urine (going to the toilet very often) Mild   Burning pain when urinating Mild   Incomplete bladder emptying (still feel like you need to urinate again after urination) Mild   Pain not associated with urination in the lower abdomen below the belly button) None   What does your urine look like? Cloudy   Blood seen in the urine Severe   Flank pain (pain in one or both sides of the lower back) Severe   Discharge from the urethra (urinary opening) without urination None   High body temperature/fever? None-<99.5   Please rate how much discomfort you have experience because of the symptoms in the past 24 hours: Mild   Please indicate how the symptoms have  interfered with your every day activities/work in the past 24 hours: Moderate   Please indicate how these symptoms have interfered with your social activities (visiting people, meeting with friends, etc.) in the past 24 hours? Severe   Are you a diabetic? Yes   Provide any additional information you feel is important. Follow Up Appointment Needed ASAP with Urologist Immediately   Please attach any relevant images or files (if you have performed a home test for UTI, please submit a photo of results)    Are you able to take your vital signs? No         Encounter Diagnoses   Name Primary?    Bladder mass Yes    Benign prostatic hyperplasia without lower urinary tract symptoms         Orders Placed This Encounter   Procedures    Ambulatory referral/consult to Urology     Standing Status:   Future     Standing Expiration Date:   8/23/2025     Referral Priority:   Routine     Referral Type:   Consultation     Referral Reason:   Specialty Services Required     Referred to Provider:   Alex Hood MD     Requested Specialty:   Urology     Number of Visits Requested:   1          I placed an order for referral but looks like patient already has referral to Urology on 8/20/20 with Dr. Hood ; will ask for sooner appnt if available   No follow-ups on file.  Dear Javad,     Thank you for reaching out to me for an e-visit so we can address your concern regarding: Urinary Tract Infection (Entered automatically based on patient selection in [employer].)     I have reviewed your chart and read the answers to the questionnaire that you completed.  Based on the information provided, my diagnosis and recommendations are as follows:    Visit Diagnosis    1. Bladder mass    2. Benign prostatic hyperplasia without lower urinary tract symptoms           Please let me know if there is something else that you need.  If you send additional messages concerning this illness, please use this message thread to communicate.      Jeimy  G Kamila      E-Visit Time Tracking:    Day 1 Time (in minutes): 10    Total Time (in minutes): 10

## 2024-08-20 ENCOUNTER — OFFICE VISIT (OUTPATIENT)
Dept: UROLOGY | Facility: CLINIC | Age: 69
End: 2024-08-20
Attending: SPECIALIST
Payer: MEDICARE

## 2024-08-20 VITALS
HEIGHT: 72 IN | BODY MASS INDEX: 41.7 KG/M2 | SYSTOLIC BLOOD PRESSURE: 152 MMHG | DIASTOLIC BLOOD PRESSURE: 96 MMHG | HEART RATE: 88 BPM | WEIGHT: 307.88 LBS | OXYGEN SATURATION: 97 %

## 2024-08-20 DIAGNOSIS — N32.89 BLADDER MASS: ICD-10-CM

## 2024-08-20 PROCEDURE — 3051F HG A1C>EQUAL 7.0%<8.0%: CPT | Mod: CPTII,S$GLB,, | Performed by: SPECIALIST

## 2024-08-20 PROCEDURE — 99999 PR PBB SHADOW E&M-EST. PATIENT-LVL IV: CPT | Mod: PBBFAC,,, | Performed by: SPECIALIST

## 2024-08-20 PROCEDURE — 4010F ACE/ARB THERAPY RXD/TAKEN: CPT | Mod: CPTII,S$GLB,, | Performed by: SPECIALIST

## 2024-08-20 PROCEDURE — 99204 OFFICE O/P NEW MOD 45 MIN: CPT | Mod: S$GLB,,, | Performed by: SPECIALIST

## 2024-08-20 PROCEDURE — 3288F FALL RISK ASSESSMENT DOCD: CPT | Mod: CPTII,S$GLB,, | Performed by: SPECIALIST

## 2024-08-20 PROCEDURE — 1159F MED LIST DOCD IN RCRD: CPT | Mod: CPTII,S$GLB,, | Performed by: SPECIALIST

## 2024-08-20 PROCEDURE — 3061F NEG MICROALBUMINURIA REV: CPT | Mod: CPTII,S$GLB,, | Performed by: SPECIALIST

## 2024-08-20 PROCEDURE — 1126F AMNT PAIN NOTED NONE PRSNT: CPT | Mod: CPTII,S$GLB,, | Performed by: SPECIALIST

## 2024-08-20 PROCEDURE — 3077F SYST BP >= 140 MM HG: CPT | Mod: CPTII,S$GLB,, | Performed by: SPECIALIST

## 2024-08-20 PROCEDURE — 1160F RVW MEDS BY RX/DR IN RCRD: CPT | Mod: CPTII,S$GLB,, | Performed by: SPECIALIST

## 2024-08-20 PROCEDURE — 3066F NEPHROPATHY DOC TX: CPT | Mod: CPTII,S$GLB,, | Performed by: SPECIALIST

## 2024-08-20 PROCEDURE — 3008F BODY MASS INDEX DOCD: CPT | Mod: CPTII,S$GLB,, | Performed by: SPECIALIST

## 2024-08-20 PROCEDURE — 1101F PT FALLS ASSESS-DOCD LE1/YR: CPT | Mod: CPTII,S$GLB,, | Performed by: SPECIALIST

## 2024-08-20 PROCEDURE — 3080F DIAST BP >= 90 MM HG: CPT | Mod: CPTII,S$GLB,, | Performed by: SPECIALIST

## 2024-08-20 RX ORDER — TADALAFIL 5 MG/1
TABLET ORAL
COMMUNITY
Start: 2024-08-20 | End: 2024-08-20

## 2024-08-20 RX ORDER — KETOCONAZOLE 20 MG/ML
SHAMPOO, SUSPENSION TOPICAL
COMMUNITY
Start: 2024-08-20 | End: 2024-08-20

## 2024-08-20 RX ORDER — CLOBETASOL PROPIONATE 0.5 MG/ML
SOLUTION TOPICAL
COMMUNITY
Start: 2024-08-20 | End: 2024-08-20

## 2024-08-20 RX ORDER — HYDROCODONE BITARTRATE AND ACETAMINOPHEN 7.5; 325 MG/1; MG/1
1 TABLET ORAL EVERY 8 HOURS PRN
Qty: 18 TABLET | Refills: 0 | COMMUNITY
Start: 2024-08-20 | End: 2024-08-20

## 2024-08-20 NOTE — PROGRESS NOTES
[facility] - Urology   Clinic Note    SUBJECTIVE:     Chief Complaint   Patient presents with    bladder mass     States he is no longer bleeding, bleeding was excessive and full of blood clots. No burning with urination. CT scan in chart from ER. Has recently been getting really bad headaches.        Referral from: Jim Garcia MD.    History of Present Illness:  Javad Coughlin is a 69 y.o. male who presents to clinic for [facility].    Pleasant 69 y.o. M with 2-3 year hx of gross painless hematuria.  He mentioned that since his hematuria resolved spontaneously, he never bothered to seek medical attention.  Recent CT scan was performed and consistent with a 2.6 cm tumor of the right posterior bladder wall.  Patient is voiding without difficulty.  He denies passing clots or any bouts of retention.    Patient endorses no additional complaints at this time.    Past Medical History:   Diagnosis Date    COPD (chronic obstructive pulmonary disease)     Diabetes mellitus, type 2     Eczema     Plaque psoriasis     Rosacea        Past Surgical History:   Procedure Laterality Date    ADENOIDECTOMY      SKIN GRAFT      SPINE SURGERY      TONSILLECTOMY         Family History   Problem Relation Name Age of Onset    Cancer Mother Samia Coughlin     Parkinsonism Sister Sadia Eastman     Diabetes Sister Sadia Eastman     Alcohol abuse Brother Hiram Coughlin         Quit Drinking and Smoking    Asbestos Brother Hiram Coughlin     Parkinsonism Brother Hiram Coughlin        Social History     Tobacco Use    Smoking status: Former     Current packs/day: 0.00     Types: Cigarettes     Quit date: 2019     Years since quittin.1     Passive exposure: Never    Smokeless tobacco: Never    Tobacco comments:     1-2 packs a day   Substance Use Topics    Alcohol use: Not Currently    Drug use: Never       Current Outpatient Medications on File Prior to Visit   Medication Sig Dispense Refill    albuterol  (PROVENTIL/VENTOLIN HFA) 90 mcg/actuation inhaler Inhale 2 puffs into the lungs every 6 (six) hours as needed for Wheezing or Shortness of Breath (cough and wheezing). 18 g 5    clobetasoL (TEMOVATE) 0.05 % external solution       finasteride (PROSCAR) 5 mg tablet Take 1 tablet (5 mg total) by mouth once daily. 90 tablet 3    fluticasone-salmeterol diskus inhaler 250-50 mcg Inhale 1 puff into the lungs every 12 (twelve) hours. 60 each 5    gabapentin (NEURONTIN) 800 MG tablet Take 1.5 tablets (1,200 mg total) by mouth 2 (two) times daily. 270 tablet 1    HYDROcodone-acetaminophen (NORCO) 7.5-325 mg per tablet Take 1 tablet by mouth every 8 (eight) hours as needed for Pain. 18 tablet 0    irbesartan (AVAPRO) 150 MG tablet Take 1 tablet (150 mg total) by mouth once daily. irbesartan 150 mg tablet 90 tablet 1    ketoconazole (NIZORAL) 2 % shampoo       metFORMIN (GLUCOPHAGE-XR) 500 MG ER 24hr tablet Take 1 tablet (500 mg total) by mouth 2 (two) times daily with meals. 180 tablet 1    metronidazole 1% (METROGEL) 1 % Gel APPLY TO FACE EVERY MORNING FOR ROSACEA      mometasone 0.1% (ELOCON) 0.1 % cream       QUEtiapine (SEROQUEL) 25 MG Tab Take 25 mg by mouth nightly as needed.      rosuvastatin (CRESTOR) 20 MG tablet Take one tablet by mouth once daily 90 tablet 1    spironolactone (ALDACTONE) 25 MG tablet Take 1 tablet (25 mg total) by mouth once daily. 90 tablet 1    tadalafiL (CIALIS) 5 MG tablet       TREMFYA 100 mg/mL AtIn Inject into the skin.      [DISCONTINUED] pantoprazole (PROTONIX) 40 MG tablet Take 1 tablet (40 mg total) by mouth once daily. 30 tablet 0    [DISCONTINUED] tamsulosin (FLOMAX) 0.4 mg Cap Take 1 capsule (0.4 mg total) by mouth once daily. 10 capsule 0     No current facility-administered medications on file prior to visit.       Review of patient's allergies indicates:  No Known Allergies    Review of Systems   Genitourinary:  Positive for hematuria. Negative for flank pain, frequency and  "urgency.   All other systems reviewed and are negative.       Review of Systems:  A review of 10+ systems was conducted with pertinent positive and negative findings documented in HPI with all other systems reviewed and negative.    OBJECTIVE:     Estimated body mass index is 41.76 kg/m² as calculated from the following:    Height as of this encounter: 6' (1.829 m).    Weight as of this encounter: 139.7 kg (307 lb 14 oz).    Vital Signs (Most Recent)  Vitals:    08/20/24 1445   BP: (!) 152/96   Pulse: 88       Physical Exam:    Physical Exam     GENERAL: patient sitting comfortably  HEENT: normocephalic  NECK: supple, no JVD  PULM: normal chest rise, no increased WOB  HEART: non-diaphoretic  ABDO: soft, nondistended, nontender  BACK: no CVA tenderness bilaterally  SKIN: warm, dry, well perfused  EXT: no bruising or edema  NEURO: grossly normal with no focal deficits  PSYCH: appropriate mood and affect    Genitourinary Exam:  deferred until cystoscopy      LABS:     Lab Results   Component Value Date    BUN 12 05/29/2024    CREATININE 1.1 05/29/2024    WBC 8.29 01/11/2024    HGB 13.0 (L) 01/11/2024    HCT 39.6 (L) 01/11/2024     01/11/2024    AST 27 05/29/2024    ALT 23 05/29/2024    ALKPHOS 64 05/29/2024    ALBUMIN 3.7 05/29/2024    HGBA1C 7.0 (H) 05/29/2024        Urinalysis:   No results found for: "UAREFLEX"     PSA:  Lab Results   Component Value Date    PSA 0.43 05/29/2024    PSA 0.30 08/22/2023       Testosterone:  No results found for: "TOTALTESTOST", "TESTOSTERONE"     Imaging:  I have personally reviewed all relevant imaging studies.    Results for orders placed or performed during the hospital encounter of 07/20/24 (from the past 2160 hour(s))   CT Renal Stone Study ABD Pelvis WO    Narrative    EXAMINATION:  CT RENAL STONE STUDY ABD PELVIS WO    CLINICAL HISTORY:  Flank pain, kidney stone suspected;    TECHNIQUE:  Low dose axial images, sagittal and coronal reformations were obtained from the lung " bases to the pubic symphysis.  Contrast was not administered.    COMPARISON:  CT chest of January 11, 2024    FINDINGS:  The liver is of normal size contour and CT density without focal defect.  The gallbladder is of normal size but contains multiple gallstones.  Edema or thickening of the gallbladder wall is not seen.  The pancreas is of normal contour and CT density without edema or mass.  The spleen is of normal size and CT density.    The adrenal glands are not enlarged.  The kidneys are of normal size contour and CT density for a noncontrast study.  There is a small cyst identified of the left kidney measuring 1.4 cm.  And a small cyst at the lower pole of the left kidney measuring 1.4 cm.  Stone or hydronephrosis is not seen on either side.  The abdominal aorta and inferior vena cava are of normal caliber.    The stomach is of normal configuration.  Small bowel dilatation or air-fluid levels are not seen.  There is diverticulosis noted in the descending and sigmoid colon without CT evidence of diverticulitis.  A normal appendix is noted.  Free fluid or free air is not seen.    The bladder is of normal size however there is a soft tissue density mass in the right side of the bladder measuring 2.6 cm.  A neoplasm is suspected.  The prostate is not enlarged.      Impression    2.6 cm mass in the right posterior bladder wall with neoplasm suspected.  Cholelithiasis.  Two small cyst of the left kidney without hydronephrosis.  Diverticulosis coli.      Electronically signed by: Anil Leon MD  Date:    07/20/2024  Time:    16:22     No results found for this or any previous visit (from the past 2160 hour(s)).  CT Renal Stone Study ABD Pelvis WO  Narrative: EXAMINATION:  CT RENAL STONE STUDY ABD PELVIS WO    CLINICAL HISTORY:  Flank pain, kidney stone suspected;    TECHNIQUE:  Low dose axial images, sagittal and coronal reformations were obtained from the lung bases to the pubic symphysis.  Contrast was not  administered.    COMPARISON:  CT chest of January 11, 2024    FINDINGS:  The liver is of normal size contour and CT density without focal defect.  The gallbladder is of normal size but contains multiple gallstones.  Edema or thickening of the gallbladder wall is not seen.  The pancreas is of normal contour and CT density without edema or mass.  The spleen is of normal size and CT density.    The adrenal glands are not enlarged.  The kidneys are of normal size contour and CT density for a noncontrast study.  There is a small cyst identified of the left kidney measuring 1.4 cm.  And a small cyst at the lower pole of the left kidney measuring 1.4 cm.  Stone or hydronephrosis is not seen on either side.  The abdominal aorta and inferior vena cava are of normal caliber.    The stomach is of normal configuration.  Small bowel dilatation or air-fluid levels are not seen.  There is diverticulosis noted in the descending and sigmoid colon without CT evidence of diverticulitis.  A normal appendix is noted.  Free fluid or free air is not seen.    The bladder is of normal size however there is a soft tissue density mass in the right side of the bladder measuring 2.6 cm.  A neoplasm is suspected.  The prostate is not enlarged.  Impression: 2.6 cm mass in the right posterior bladder wall with neoplasm suspected.  Cholelithiasis.  Two small cyst of the left kidney without hydronephrosis.  Diverticulosis coli.    Electronically signed by: Anil Leon MD  Date:    07/20/2024  Time:    16:22         ASSESSMENT     1. Bladder mass        PLAN:     Although CT findings highly suggestive of papillary bladder tumors I favor preoperative assessment with outpatient cystoscopy.  Patient understands the potential of having a bladder malignancy and will follow up for outpatient cystoscopy in the near future.    Alex Hood MD  Urology  [facility]     Disclaimer: This note has been generated using voice-recognition  software. There may be typographical errors that have been missed during proof-reading.

## 2024-08-22 ENCOUNTER — PATIENT MESSAGE (OUTPATIENT)
Dept: INTERNAL MEDICINE | Facility: CLINIC | Age: 69
End: 2024-08-22
Payer: MEDICARE

## 2024-08-27 ENCOUNTER — PATIENT MESSAGE (OUTPATIENT)
Dept: INTERNAL MEDICINE | Facility: CLINIC | Age: 69
End: 2024-08-27
Payer: MEDICARE

## 2024-08-29 ENCOUNTER — PROCEDURE VISIT (OUTPATIENT)
Dept: UROLOGY | Facility: CLINIC | Age: 69
End: 2024-08-29
Attending: SPECIALIST
Payer: MEDICARE

## 2024-08-29 ENCOUNTER — OFFICE VISIT (OUTPATIENT)
Dept: INTERNAL MEDICINE | Facility: CLINIC | Age: 69
End: 2024-08-29
Payer: MEDICARE

## 2024-08-29 VITALS
OXYGEN SATURATION: 95 % | HEART RATE: 91 BPM | DIASTOLIC BLOOD PRESSURE: 68 MMHG | RESPIRATION RATE: 20 BRPM | SYSTOLIC BLOOD PRESSURE: 134 MMHG | HEIGHT: 72 IN | WEIGHT: 300.69 LBS | BODY MASS INDEX: 40.73 KG/M2

## 2024-08-29 DIAGNOSIS — E66.01 CLASS 3 SEVERE OBESITY DUE TO EXCESS CALORIES WITH SERIOUS COMORBIDITY AND BODY MASS INDEX (BMI) OF 40.0 TO 44.9 IN ADULT: ICD-10-CM

## 2024-08-29 DIAGNOSIS — N32.89 BLADDER MASS: ICD-10-CM

## 2024-08-29 DIAGNOSIS — I10 PRIMARY HYPERTENSION: Primary | ICD-10-CM

## 2024-08-29 DIAGNOSIS — N32.89 MASS OF URINARY BLADDER: ICD-10-CM

## 2024-08-29 DIAGNOSIS — J33.9 NASAL POLYP: ICD-10-CM

## 2024-08-29 DIAGNOSIS — E11.9 TYPE 2 DIABETES MELLITUS WITHOUT COMPLICATION, WITHOUT LONG-TERM CURRENT USE OF INSULIN: ICD-10-CM

## 2024-08-29 PROCEDURE — 3075F SYST BP GE 130 - 139MM HG: CPT | Mod: CPTII,S$GLB,, | Performed by: NURSE PRACTITIONER

## 2024-08-29 PROCEDURE — 99999 PR PBB SHADOW E&M-EST. PATIENT-LVL IV: CPT | Mod: PBBFAC,,, | Performed by: NURSE PRACTITIONER

## 2024-08-29 PROCEDURE — 3061F NEG MICROALBUMINURIA REV: CPT | Mod: CPTII,S$GLB,, | Performed by: NURSE PRACTITIONER

## 2024-08-29 PROCEDURE — 1101F PT FALLS ASSESS-DOCD LE1/YR: CPT | Mod: CPTII,S$GLB,, | Performed by: NURSE PRACTITIONER

## 2024-08-29 PROCEDURE — 1125F AMNT PAIN NOTED PAIN PRSNT: CPT | Mod: CPTII,S$GLB,, | Performed by: NURSE PRACTITIONER

## 2024-08-29 PROCEDURE — 4010F ACE/ARB THERAPY RXD/TAKEN: CPT | Mod: CPTII,S$GLB,, | Performed by: NURSE PRACTITIONER

## 2024-08-29 PROCEDURE — 1159F MED LIST DOCD IN RCRD: CPT | Mod: CPTII,S$GLB,, | Performed by: NURSE PRACTITIONER

## 2024-08-29 PROCEDURE — 3051F HG A1C>EQUAL 7.0%<8.0%: CPT | Mod: CPTII,S$GLB,, | Performed by: NURSE PRACTITIONER

## 2024-08-29 PROCEDURE — 3288F FALL RISK ASSESSMENT DOCD: CPT | Mod: CPTII,S$GLB,, | Performed by: NURSE PRACTITIONER

## 2024-08-29 PROCEDURE — 3008F BODY MASS INDEX DOCD: CPT | Mod: CPTII,S$GLB,, | Performed by: NURSE PRACTITIONER

## 2024-08-29 PROCEDURE — 3078F DIAST BP <80 MM HG: CPT | Mod: CPTII,S$GLB,, | Performed by: NURSE PRACTITIONER

## 2024-08-29 PROCEDURE — 99214 OFFICE O/P EST MOD 30 MIN: CPT | Mod: S$GLB,,, | Performed by: NURSE PRACTITIONER

## 2024-08-29 PROCEDURE — 3066F NEPHROPATHY DOC TX: CPT | Mod: CPTII,S$GLB,, | Performed by: NURSE PRACTITIONER

## 2024-08-29 RX ORDER — IRBESARTAN 300 MG/1
300 TABLET ORAL NIGHTLY
Qty: 30 TABLET | Refills: 0 | Status: SHIPPED | OUTPATIENT
Start: 2024-08-29 | End: 2025-08-29

## 2024-08-29 RX ORDER — NITROFURANTOIN 25; 75 MG/1; MG/1
100 CAPSULE ORAL
Status: SHIPPED | OUTPATIENT
Start: 2024-08-29

## 2024-08-29 NOTE — PROGRESS NOTES
Subjective:           Patient ID: Javad Coughlin is a 69 y.o. male.    Chief Complaint: Hypertension and Headache (Home BP readings high systolic 150-180 w/normal diastolic 70-80's)    Javad Coughlin is a 69 y.o. male known to me ; known Type II DM, COPD, ANTONI, HTN, ^CHOl, BPH, neuropathy, psoriasis    Noted ED visit in January ; Dx duodenitis   Since last visit he developed hematuria with subsequent urology workup revealing a bladder mass; 7/20/24 Ct renal stone   2.6 cm mass in the right posterior bladder wall with neoplasm suspected.  Cholelithiasis.  Two small cyst of the left kidney without hydronephrosis.  Diverticulosis coli.  8/20   Patient is voiding without difficulty.  He denies passing clots or any bouts of retention.      Due for 6m f/u in November but having headache and ^BP of late     HTN - long hx ; current meds; Avapro 150mg and aldactone 25mg     /68 HR, 91   BP Readings from Last 3 Encounters:  08/29/24 : 134/68  08/20/24 : (!) 152/96  07/20/24 : (!) 144/77      He is also requesting to see ENT for growth to right nostril   Requesting podiatry referral due to diabetes, discoloration, toe nail fungus.;   Difficulty with self care due to HOOPER           Review of Systems   Constitutional: Negative.    HENT:  Negative for congestion, ear pain, sinus pressure, sneezing and sore throat.         Noting nasal growth to right nostril    Eyes: Negative.    Respiratory:  Positive for shortness of breath (Chronic HOOPER). Negative for cough, chest tightness and wheezing.    Cardiovascular: Negative.  Negative for chest pain.   Gastrointestinal:  Negative for abdominal pain, blood in stool, constipation, diarrhea, nausea and vomiting.   Genitourinary:  Negative for difficulty urinating, dysuria and flank pain.   Musculoskeletal: Negative.  Negative for joint swelling.   Skin:  Positive for color change (feet).   Neurological:  Negative for dizziness, weakness and headaches.   Hematological: Negative.     Psychiatric/Behavioral: Negative.  Negative for behavioral problems and confusion.        Objective:      Physical Exam  Vitals and nursing note reviewed.   Constitutional:       General: He is not in acute distress.     Appearance: He is well-developed. He is obese. He is not ill-appearing, toxic-appearing or diaphoretic.   HENT:      Head: Normocephalic and atraumatic.      Nose: Nose normal.   Eyes:      Pupils: Pupils are equal, round, and reactive to light.   Cardiovascular:      Rate and Rhythm: Normal rate and regular rhythm.      Heart sounds: No murmur heard.  Pulmonary:      Effort: Pulmonary effort is normal. No respiratory distress.      Breath sounds: Normal breath sounds. No stridor. No wheezing or rhonchi.      Comments: Diminished   Abdominal:      General: Bowel sounds are normal.      Palpations: Abdomen is soft.   Musculoskeletal:         General: No swelling, tenderness, deformity or signs of injury. Normal range of motion.      Cervical back: Normal range of motion and neck supple.      Right lower leg: No edema.      Left lower leg: No edema.   Skin:     General: Skin is warm and dry.      Capillary Refill: Capillary refill takes less than 2 seconds.      Findings: Rash (face, forhead) present.      Comments: Bilateral Pretibial hyperpigmented rash - DM dermatitis    Neurological:      Mental Status: He is alert and oriented to person, place, and time.   Psychiatric:         Behavior: Behavior normal.         Thought Content: Thought content normal.         Judgment: Judgment normal.         Assessment:       1. Primary hypertension    2. Type 2 diabetes mellitus without complication, without long-term current use of insulin    3. Nasal polyp    4. Mass of urinary bladder    5. Class 3 severe obesity due to excess calories with serious comorbidity and body mass index (BMI) of 40.0 to 44.9 in adult        Plan:   1. Primary hypertension  Overview:  12/21/22 Dx approx 10 yrs ago   Dx - 10yrs,   "" off and on" , irbesartan - 150mg Rx    2/8/23 cardiology evaluation with Dr. Segura   Primary hypertension - Primary        Aldactone 25 mg added to ARB  for better blood pressure control an attempt at improvement in shortness of breath.  There could be a component of diastolic heart failure.           Irbesartan 150mg daily  Aldactone 25mg po daily   Cardiology records noted;     Orders:  -     irbesartan (AVAPRO) 300 MG tablet; Take 1 tablet (300 mg total) by mouth every evening.  Dispense: 30 tablet; Refill: 0    2. Type 2 diabetes mellitus without complication, without long-term current use of insulin  Overview:  12/21/22 Prior Dx 3 yrs ago; tx with Ozempic- was getting samples   Type 2 diabetes mellitus without complications  diet  Losing weight   Metformin ER 500mg daily   Notes he was 318 down to 301  12/22/22 urine MA normal   ARB  Statin     Assessment & Plan:  Lab Results   Component Value Date    HGBA1C 7.0 (H) 05/29/2024   GLP-1 not covered       Orders:  -     Ambulatory referral/consult to Podiatry; Future; Expected date: 09/05/2024    3. Nasal polyp  -     Ambulatory referral/consult to ENT; Future; Expected date: 09/05/2024    4. Mass of urinary bladder  Overview:  Since last visit he developed hematuria with subsequent urology workup revealing a bladder mass; 7/20/24 Ct renal stone   2.6 cm mass in the right posterior bladder wall with neoplasm suspected.  Cholelithiasis.  Two small cyst of the left kidney without hydronephrosis.  Diverticulosis coli.  8/20   Patient is voiding without difficulty.  He denies passing clots or any bouts of retention.      Assessment & Plan:  Planned for cystoscope today       5. Class 3 severe obesity due to excess calories with serious comorbidity and body mass index (BMI) of 40.0 to 44.9 in adult  Overview:  Wt Readings from Last 3 Encounters:   08/22/23 1418 (!) 140.8 kg (310 lb 6.5 oz)   08/22/23 1414 (!) 140.8 kg (310 lb 6.5 oz)   03/30/23 1513 133 kg (293 lb " 3.4 oz)     Think this is a large component of HOOPER         Discussed can take irbsartan 150mg bid or 300mg daily   Use remainder of his 150mg   F/U in 1m with Alternate provider for BP   Wants to see JEFFERSON Marks NP

## 2024-08-29 NOTE — PROCEDURES
Procedure: Cystoscopy    Indications:  Patient reports a 2-3 year history of sporadic bouts of gross painless hematuria.  Recent CT scan suggestive of a papillary bladder tumors along the left bladder wall.  We agreed to proceed with cystoscopic evaluation in the clinic prior to proceeding to a TURBT under anesthesia.    Patient was brought to the procedure room and placed on the examining table in a supine position.  His genitalia was prepped and draped in the usual sterile fashion.  Flexible cystourethroscopy was performed.  Patient was found to have small papillary lesions along the left bladder wall, roughly 1-2 cm in total diameter.  Patient tolerated the procedure well.    Plan:     Patient understands that cystoscopic exam findings are consistent with typical papillary bladder tumors which need to be resected.  He understands this is likely to be malignant.  We will schedule him in the OR for TURBT.  Risks were discussed and include but not limited to bladder perforation and need for laparotomy and surgical repair, infection, bleeding, retention, pain, and anesthetic risks.  All questions were answered.

## 2024-09-09 RX ORDER — TAMSULOSIN HYDROCHLORIDE 0.4 MG/1
0.4 CAPSULE ORAL DAILY
Qty: 30 CAPSULE | Refills: 11 | Status: SHIPPED | OUTPATIENT
Start: 2024-09-09 | End: 2025-09-09

## 2024-09-09 NOTE — TELEPHONE ENCOUNTER
----- Message from Cary Holland sent at 2024  2:33 PM CDT -----  Contact: [facility]  Javad Coughlin  MRN: [id number]  : 1955  PCP: Jeimy Treviño  Home Phone      [phone]  Work Phone      Not on file.  Mobile          [phone]      MESSAGE: Patient needs a refill on Tamsulosin 4 mg, 1 daily sent to [facility].        Phone: [phone]

## 2024-09-10 ENCOUNTER — TELEPHONE (OUTPATIENT)
Dept: UROLOGY | Facility: CLINIC | Age: 69
End: 2024-09-10
Payer: MEDICARE

## 2024-09-10 NOTE — TELEPHONE ENCOUNTER
Returned call to patient and states he wanted to know what time his procedure was on the 25th of this month. Notified him that he is scheduled for 9/25/24 @ 11:30am and someone from the OR will be reaching out to him the day before to go over more details. V/u.

## 2024-09-10 NOTE — TELEPHONE ENCOUNTER
----- Message from Saige Morgan sent at 9/10/2024  1:00 PM CDT -----  Contact: Patient  Javad Coughlin  MRN: [id number]  : 1955  PCP: Jeimy Treviño  Home Phone      [phone]  Work Phone      Not on file.  Mobile          [phone]      MESSAGE: Patient would like a returned call to reschedule his procedure on .    PHONE; [phone]

## 2024-09-16 DIAGNOSIS — I10 PRIMARY HYPERTENSION: ICD-10-CM

## 2024-09-16 RX ORDER — IRBESARTAN 300 MG/1
300 TABLET ORAL NIGHTLY
Qty: 30 TABLET | Refills: 0 | Status: SHIPPED | OUTPATIENT
Start: 2024-09-16 | End: 2025-09-16

## 2024-09-16 NOTE — TELEPHONE ENCOUNTER
Fax received for medication refill request    LOV 8/29/2024 with Jeimy Treviño  Scheduled visit 10/2/2024 with you

## 2024-09-25 ENCOUNTER — HOSPITAL ENCOUNTER (OUTPATIENT)
Facility: HOSPITAL | Age: 69
Discharge: HOME | End: 2024-09-25
Attending: SPECIALIST | Admitting: SPECIALIST
Payer: MEDICARE

## 2024-09-25 ENCOUNTER — ANESTHESIA EVENT (OUTPATIENT)
Dept: SURGERY | Facility: HOSPITAL | Age: 69
End: 2024-09-25
Payer: MEDICARE

## 2024-09-25 ENCOUNTER — ANESTHESIA (OUTPATIENT)
Dept: SURGERY | Facility: HOSPITAL | Age: 69
End: 2024-09-25
Payer: MEDICARE

## 2024-09-25 VITALS
SYSTOLIC BLOOD PRESSURE: 128 MMHG | DIASTOLIC BLOOD PRESSURE: 66 MMHG | OXYGEN SATURATION: 98 % | RESPIRATION RATE: 16 BRPM | HEART RATE: 66 BPM | TEMPERATURE: 98 F

## 2024-09-25 DIAGNOSIS — N32.89 MASS OF URINARY BLADDER: Primary | ICD-10-CM

## 2024-09-25 PROCEDURE — 25000003 PHARM REV CODE 250: Performed by: NURSE ANESTHETIST, CERTIFIED REGISTERED

## 2024-09-25 PROCEDURE — 63600175 PHARM REV CODE 636 W HCPCS: Performed by: SPECIALIST

## 2024-09-25 PROCEDURE — 25000003 PHARM REV CODE 250: Performed by: SPECIALIST

## 2024-09-25 PROCEDURE — 88305 TISSUE EXAM BY PATHOLOGIST: CPT | Mod: 59 | Performed by: PATHOLOGY

## 2024-09-25 PROCEDURE — 52235 CYSTOSCOPY AND TREATMENT: CPT | Mod: ,,, | Performed by: SPECIALIST

## 2024-09-25 PROCEDURE — 63600175 PHARM REV CODE 636 W HCPCS: Performed by: NURSE ANESTHETIST, CERTIFIED REGISTERED

## 2024-09-25 PROCEDURE — 25000242 PHARM REV CODE 250 ALT 637 W/ HCPCS: Performed by: NURSE ANESTHETIST, CERTIFIED REGISTERED

## 2024-09-25 PROCEDURE — 88305 TISSUE EXAM BY PATHOLOGIST: CPT | Mod: 26,,, | Performed by: PATHOLOGY

## 2024-09-25 RX ORDER — HYDROCODONE BITARTRATE AND ACETAMINOPHEN 5; 325 MG/1; MG/1
1 TABLET ORAL EVERY 4 HOURS PRN
Status: CANCELLED | OUTPATIENT
Start: 2024-09-25

## 2024-09-25 RX ORDER — MIDAZOLAM HYDROCHLORIDE 1 MG/ML
INJECTION INTRAMUSCULAR; INTRAVENOUS
Status: DISCONTINUED | OUTPATIENT
Start: 2024-09-25 | End: 2024-09-25

## 2024-09-25 RX ORDER — FENTANYL CITRATE 50 UG/ML
INJECTION, SOLUTION INTRAMUSCULAR; INTRAVENOUS
Status: DISCONTINUED | OUTPATIENT
Start: 2024-09-25 | End: 2024-09-25

## 2024-09-25 RX ORDER — NITROFURANTOIN 25; 75 MG/1; MG/1
100 CAPSULE ORAL 2 TIMES DAILY
Qty: 14 CAPSULE | Refills: 0 | Status: SHIPPED | OUTPATIENT
Start: 2024-09-25

## 2024-09-25 RX ORDER — ROCURONIUM BROMIDE 10 MG/ML
INJECTION, SOLUTION INTRAVENOUS
Status: DISCONTINUED | OUTPATIENT
Start: 2024-09-25 | End: 2024-09-25

## 2024-09-25 RX ORDER — SUCCINYLCHOLINE CHLORIDE 20 MG/ML
INJECTION INTRAMUSCULAR; INTRAVENOUS
Status: DISCONTINUED | OUTPATIENT
Start: 2024-09-25 | End: 2024-09-25

## 2024-09-25 RX ORDER — HYOSCYAMINE SULFATE 0.12 MG/1
0.12 TABLET SUBLINGUAL ONCE
Status: DISCONTINUED | OUTPATIENT
Start: 2024-09-25 | End: 2024-09-25 | Stop reason: HOSPADM

## 2024-09-25 RX ORDER — ALBUTEROL SULFATE 90 UG/1
INHALANT RESPIRATORY (INHALATION)
Status: DISCONTINUED | OUTPATIENT
Start: 2024-09-25 | End: 2024-09-25

## 2024-09-25 RX ORDER — LIDOCAINE HYDROCHLORIDE 20 MG/ML
INJECTION, SOLUTION EPIDURAL; INFILTRATION; INTRACAUDAL; PERINEURAL
Status: DISCONTINUED | OUTPATIENT
Start: 2024-09-25 | End: 2024-09-25

## 2024-09-25 RX ORDER — HYDROCODONE BITARTRATE AND ACETAMINOPHEN 5; 325 MG/1; MG/1
1 TABLET ORAL EVERY 6 HOURS PRN
Status: DISCONTINUED | OUTPATIENT
Start: 2024-09-25 | End: 2024-09-25 | Stop reason: HOSPADM

## 2024-09-25 RX ORDER — HYDROCODONE BITARTRATE AND ACETAMINOPHEN 5; 325 MG/1; MG/1
1 TABLET ORAL EVERY 6 HOURS PRN
Qty: 12 TABLET | Refills: 0 | Status: SHIPPED | OUTPATIENT
Start: 2024-09-25

## 2024-09-25 RX ORDER — ONDANSETRON HYDROCHLORIDE 2 MG/ML
INJECTION, SOLUTION INTRAMUSCULAR; INTRAVENOUS
Status: DISCONTINUED | OUTPATIENT
Start: 2024-09-25 | End: 2024-09-25

## 2024-09-25 RX ORDER — PROPOFOL 10 MG/ML
VIAL (ML) INTRAVENOUS
Status: DISCONTINUED | OUTPATIENT
Start: 2024-09-25 | End: 2024-09-25

## 2024-09-25 RX ADMIN — FENTANYL CITRATE 50 MCG: 0.05 INJECTION, SOLUTION INTRAMUSCULAR; INTRAVENOUS at 03:09

## 2024-09-25 RX ADMIN — ROCURONIUM BROMIDE 45 MG: 10 INJECTION, SOLUTION INTRAVENOUS at 02:09

## 2024-09-25 RX ADMIN — ALBUTEROL SULFATE 2 PUFF: 90 AEROSOL, METERED RESPIRATORY (INHALATION) at 01:09

## 2024-09-25 RX ADMIN — LIDOCAINE HYDROCHLORIDE 100 MG: 20 INJECTION, SOLUTION EPIDURAL; INFILTRATION; INTRACAUDAL; PERINEURAL at 02:09

## 2024-09-25 RX ADMIN — FENTANYL CITRATE 50 MCG: 0.05 INJECTION, SOLUTION INTRAMUSCULAR; INTRAVENOUS at 02:09

## 2024-09-25 RX ADMIN — MIDAZOLAM HYDROCHLORIDE 2 MG: 1 INJECTION, SOLUTION INTRAMUSCULAR; INTRAVENOUS at 01:09

## 2024-09-25 RX ADMIN — SUGAMMADEX 200 MG: 100 INJECTION, SOLUTION INTRAVENOUS at 03:09

## 2024-09-25 RX ADMIN — PROPOFOL 150 MG: 10 INJECTION, EMULSION INTRAVENOUS at 02:09

## 2024-09-25 RX ADMIN — ROCURONIUM BROMIDE 5 MG: 10 INJECTION, SOLUTION INTRAVENOUS at 02:09

## 2024-09-25 RX ADMIN — SUCCINYLCHOLINE CHLORIDE 160 MG: 20 INJECTION, SOLUTION INTRAMUSCULAR; INTRAVENOUS at 02:09

## 2024-09-25 RX ADMIN — SODIUM CHLORIDE, SODIUM LACTATE, POTASSIUM CHLORIDE, AND CALCIUM CHLORIDE: .6; .31; .03; .02 INJECTION, SOLUTION INTRAVENOUS at 01:09

## 2024-09-25 RX ADMIN — ONDANSETRON 4 MG: 2 INJECTION INTRAMUSCULAR; INTRAVENOUS at 02:09

## 2024-09-25 RX ADMIN — CEFAZOLIN 2 G: 2 INJECTION, POWDER, FOR SOLUTION INTRAMUSCULAR; INTRAVENOUS at 02:09

## 2024-09-25 RX ADMIN — SODIUM CHLORIDE, SODIUM LACTATE, POTASSIUM CHLORIDE, AND CALCIUM CHLORIDE: .6; .31; .03; .02 INJECTION, SOLUTION INTRAVENOUS at 03:09

## 2024-09-25 NOTE — H&P
[facility]  Urology  History & Physical    Patient Name: Javad Coughlin  MRN: [id number]  Admission Date: 2024  Code Status: No Order   Attending Provider: JOSE ALBERTO IBARRA MD  Primary Care Physician: Jeimy Treviño NP  Principal Problem:<principal problem not specified>    Subjective:     HPI:   69 y.o. M with a 2.6 cm tumor of right posterior bladder wall who comes forth for TURBT.    Past Medical History:   Diagnosis Date    COPD (chronic obstructive pulmonary disease)     Diabetes mellitus, type 2     Eczema     Plaque psoriasis     Rosacea        Past Surgical History:   Procedure Laterality Date    ADENOIDECTOMY      SKIN GRAFT      SPINE SURGERY      TONSILLECTOMY         Review of patient's allergies indicates:  No Known Allergies    Family History       Problem Relation (Age of Onset)    Alcohol abuse Brother    Asbestos Brother    Cancer Mother    Diabetes Sister    Parkinsonism Sister, Brother            Tobacco Use    Smoking status: Former     Current packs/day: 0.00     Average packs/day: 0.5 packs/day for 53.1 years (26.5 ttl pk-yrs)     Types: Cigarettes     Start date: 1966     Quit date: 2019     Years since quittin.2     Passive exposure: Never    Smokeless tobacco: Never    Tobacco comments:     1-2 packs a day   Substance and Sexual Activity    Alcohol use: Not Currently    Drug use: Never    Sexual activity: Yes     Partners: Female     Birth control/protection: Post-menopausal       Review of Systems   Genitourinary:  Positive for hematuria.       Objective:     BP: (139)/(63) 139/63     There is no height or weight on file to calculate BMI.    No intake/output data recorded.       Lines/Drains/Airways       Peripheral Intravenous Line  Duration                  Peripheral IV - Single Lumen 24 1311 22 G Posterior;Right Hand <1 day                    Physical Exam  Constitutional:       Appearance: He is obese.   HENT:      Head: Normocephalic and  "atraumatic.      Right Ear: External ear normal.      Left Ear: External ear normal.      Nose: Nose normal.      Mouth/Throat:      Pharynx: Oropharynx is clear.   Eyes:      Extraocular Movements: Extraocular movements intact.   Cardiovascular:      Rate and Rhythm: Normal rate and regular rhythm.      Heart sounds: Normal heart sounds.   Pulmonary:      Effort: Pulmonary effort is normal.      Breath sounds: Normal breath sounds.   Abdominal:      Palpations: Abdomen is soft.   Genitourinary:     Penis: Normal.    Musculoskeletal:         General: Normal range of motion.      Cervical back: Normal range of motion.   Skin:     General: Skin is warm.   Neurological:      General: No focal deficit present.      Mental Status: He is alert.   Psychiatric:         Mood and Affect: Mood normal.         Behavior: Behavior normal.         Significant Labs:  BMP:  No results for input(s): "NA", "K", "CL", "CO2", "BUN", "CREATININE", "LABGLOM", "GLUCOSE", "CALCIUM" in the last 168 hours.    CBC:  No results for input(s): "WBC", "HGB", "HCT", "PLT" in the last 168 hours.    All pertinent labs results from the past 24 hours have been reviewed.    Significant Imaging:  All pertinent imaging results/findings from the past 24 hours have been reviewed.    Assessment and Plan:     There are no hospital problems to display for this patient.      VTE Risk Mitigation (From admission, onward)           Ordered     Place sequential compression device  Until discontinued         09/25/24 1340     IP VTE HIGH RISK PATIENT  Once         09/25/24 1340                  Imp: Bladder Tumor approximately 2.6 cm  Plan: TURBT (with intraoperative dose of MMC)  JOSE ALBERTO IBARRA MD  Urology  [facility]    "

## 2024-09-25 NOTE — DISCHARGE SUMMARY
[place] - Surgery  Discharge Note  Short Stay    Procedure(s) (LRB):  TURBT (TRANSURETHRAL RESECTION OF BLADDER TUMOR) (MITOMYCIN AT END OF CASE) (N/A)      OUTCOME: Patient tolerated treatment/procedure well without complication and is now ready for discharge.    DISPOSITION: Home or Self Care    FINAL DIAGNOSIS:  <principal problem not specified>    FOLLOWUP: In clinic    DISCHARGE INSTRUCTIONS:  No discharge procedures on file.     TIME SPENT ON DISCHARGE: 20 minutes

## 2024-09-25 NOTE — OP NOTE
[facility] - Surgery  Surgery Department  Operative Note    SUMMARY     Date of Procedure: 9/25/2024     Procedure: Procedure(s) (LRB):  TURBT (TRANSURETHRAL RESECTION OF BLADDER TUMOR) (MITOMYCIN AT END OF CASE) (N/A)     Surgeons and Role:     * Alex Hood MD - Primary    Assisting Surgeon: None    Pre-Operative Diagnosis: Bladder mass [N32.89]    Post-Operative Diagnosis: Post-Op Diagnosis Codes:     * Bladder mass [N32.89]    Anesthesia: General    Operative Findings (including complications, if any): none    Description of Technical Procedures:  Patient was taken to the operating room arena and given general anesthesia.  Time-out was performed.  Antibiotics had been administered.  Patient was placed in a dorsal lithotomy position.  His genitalia was prepped and draped in the usual sterile fashion.  Cystoscopic examination was performed.  Both ureteral orifices were visualized.  Along the right posterior wall of the bladder he had a documented 2.6 cm tumor.  I 1st used a cold cup biopsy forceps to obtain to deep biopsies of the base of the tumor.  I then proceeded width resection.  Bipolar electrocautery was used to resect down to the base of the tumor.  The superficial fragments were sent in a specimen formalin container.  Finally the base of the tumor was resected and sent to pathology as well.  A rollerball was used to fulgurate small satellite lesions.  Hemostasis was excellent.  I then passed a 2 way 20 Green[place]ic hematuria catheter and left this to straight drainage.  Balloon was inflated with 30 cc.  Finally I infused 40 cc of mitomycin cc.  Og was left to straight drainage and the Og bag was suspended from an IV pole.  Patient tolerated the procedure well and will remain in recovery until chemotherapy is drained and properly disposed from his Og bag.  Patient will follow up in the office in 1-2 days for catheter removal.  Pathology review in 1 week.    Significant Surgical Tasks Conducted by  the Assistant(s), if Applicable:  None    Estimated Blood Loss (EBL): * No values recorded between 9/25/2024  2:12 PM and 9/25/2024  3:13 PM *           Implants: * No implants in log *    Specimens:   Specimen (24h ago, onward)       Start     Ordered    09/25/24 1425  Specimen to Pathology, Surgery Urology  Once        Comments: Pre-op Diagnosis: Bladder mass [N32.89]Procedure(s):TURBT (TRANSURETHRAL RESECTION OF BLADDER TUMOR) (MITOMYCIN AT END OF CASE) Number of specimens: 2Name of specimens: 1. Deep bladder bx  2. Superficial bladder bxDr. Sana     References:    Click here for ordering Quick Tip   Question Answer Comment   Procedure Type: Urology    Release to patient Immediate        09/25/24 1502                            Condition: Good    Disposition: PACU - hemodynamically stable.    Attestation: Op Note Attestation: I performed the procedure.

## 2024-09-25 NOTE — TRANSFER OF CARE
Anesthesia Transfer of Care Note    Patient: Javad Coughlin    Procedure(s) Performed: Procedure(s) (LRB):  TURBT (TRANSURETHRAL RESECTION OF BLADDER TUMOR) (MITOMYCIN AT END OF CASE) (N/A)    Patient location: PACU    Anesthesia Type: general    Transport from OR: Transported from OR on 6-10 L/min O2 by face mask with adequate spontaneous ventilation    Post pain: adequate analgesia    Post assessment: no apparent anesthetic complications and tolerated procedure well    Post vital signs: stable    Level of consciousness: sedated    Nausea/Vomiting: no nausea/vomiting    Complications: none    Transfer of care protocol was followed      Last vitals: Visit Vitals  /63

## 2024-09-25 NOTE — ANESTHESIA PROCEDURE NOTES
Intubation    Date/Time: 9/25/2024 2:06 PM    Performed by: Zelalem Shine CRNA  Authorized by: Donis Can CRNA    Intubation:     Induction:  Intravenous    Intubated:  Postinduction    Mask Ventilation:  Easy with oral airway    Attempts:  1    Attempted By:  Student    Method of Intubation:  Video laryngoscopy    Blade:  Jama 3    Laryngeal View Grade: Grade I - full view of cords      Difficult Airway Encountered?: No      Complications:  None    Airway Device:  Oral endotracheal tube    Airway Device Size:  7.5    Style/Cuff Inflation:  Cuffed (inflated to minimal occlusive pressure)    Tube secured:  23    Secured at:  The lips    Placement Verified By:  Capnometry and Revisualization with laryngoscopy    Complicating Factors:  None    Findings Post-Intubation:  BS equal bilateral

## 2024-09-25 NOTE — ANESTHESIA POSTPROCEDURE EVALUATION
Anesthesia Post Evaluation    Patient: Javad Coughlin    Procedure(s) Performed: Procedure(s) (LRB):  TURBT (TRANSURETHRAL RESECTION OF BLADDER TUMOR) (MITOMYCIN AT END OF CASE) (N/A)    Final Anesthesia Type: general      Patient location during evaluation: PACU  Patient participation: Yes- Able to Participate  Level of consciousness: awake and alert and oriented  Post-procedure vital signs: reviewed and stable  Pain management: adequate  Airway patency: patent    PONV status at discharge: No PONV  Anesthetic complications: no      Cardiovascular status: blood pressure returned to baseline and hemodynamically stable  Respiratory status: unassisted, spontaneous ventilation and room air  Hydration status: euvolemic  Follow-up not needed.              Vitals Value Taken Time   /66 09/25/24 1705   Temp 36.5 °C (97.7 °F) 09/25/24 1515   Pulse 66 09/25/24 1705   Resp 16 09/25/24 1705   SpO2 98 % 09/25/24 1705         Event Time   Out of Recovery 17:19:59         Pain/Juan Score: Juan Score: 10 (9/25/2024  5:16 PM)

## 2024-09-25 NOTE — ANESTHESIA PREPROCEDURE EVALUATION
09/25/2024  Javad Coughlin is a 69 y.o., male.  Past Medical History:   Diagnosis Date    COPD (chronic obstructive pulmonary disease)     Diabetes mellitus, type 2     Eczema     Plaque psoriasis     Rosacea      Past Surgical History:   Procedure Laterality Date    ADENOIDECTOMY      SKIN GRAFT      SPINE SURGERY      TONSILLECTOMY           Pre-op Assessment    I have reviewed the Patient Summary Reports.     I have reviewed the Nursing Notes. I have reviewed the NPO Status.   I have reviewed the Medications.     Review of Systems  Anesthesia Hx:  No problems with previous Anesthesia   History of prior surgery of interest to airway management or planning:            Denies Personal Hx of Anesthesia complications.                    Social:  Former Smoker       Hematology/Oncology:  Hematology Normal   Oncology Normal                                   EENT/Dental:  EENT/Dental Normal           Cardiovascular:  Exercise tolerance: good   Hypertension, well controlled                                        Pulmonary:   COPD, mild     Sleep Apnea                Renal/:  Renal/ Normal                 Hepatic/GI:  Hepatic/GI Normal                 Musculoskeletal:  Musculoskeletal Normal                Neurological:    Neuromuscular Disease,             Peripheral Neuropathy                          Endocrine:  Diabetes, well controlled, type 2         Morbid Obesity / BMI > 40  Dermatological:  Skin Normal    Psych:  Psychiatric Normal                  Physical Exam  General: Well nourished, Cooperative, Alert and Oriented    Airway:  Mallampati: III   Mouth Opening: Normal  TM Distance: Normal  Tongue: Normal      Anesthesia Plan  Type of Anesthesia, risks & benefits discussed:    Anesthesia Type: Gen ETT  Intra-op Monitoring Plan: Standard ASA Monitors  Post Op Pain Control Plan: multimodal  analgesia and IV/PO Opioids PRN  Induction:  IV  Airway Plan: Video, Post-Induction  Informed Consent: Informed consent signed with the Patient and all parties understand the risks and agree with anesthesia plan.  All questions answered. Patient consented to blood products? Yes  ASA Score: 3  Day of Surgery Review of History & Physical: H&P Update referred to the surgeon/provider.I have interviewed and examined the patient. I have reviewed the patient's H&P dated: 9/25/24. There are no significant changes.     Ready For Surgery From Anesthesia Perspective.     .

## 2024-09-26 RX ORDER — ATROPA BELLADONNA AND OPIUM 16.2; 6 MG/1; MG/1
SUPPOSITORY RECTAL
Status: DISCONTINUED | OUTPATIENT
Start: 2024-09-25 | End: 2024-09-26 | Stop reason: HOSPADM

## 2024-09-27 ENCOUNTER — OFFICE VISIT (OUTPATIENT)
Dept: UROLOGY | Facility: CLINIC | Age: 69
End: 2024-09-27
Attending: SPECIALIST
Payer: MEDICARE

## 2024-09-27 DIAGNOSIS — N32.89 BLADDER MASS: Primary | ICD-10-CM

## 2024-09-27 PROCEDURE — 99999 PR PBB SHADOW E&M-EST. PATIENT-LVL I: CPT | Mod: PBBFAC,,, | Performed by: SPECIALIST

## 2024-09-27 NOTE — PROGRESS NOTES
[facility] - Urology   Clinic Note    SUBJECTIVE:     Chief Complaint   Patient presents with    Follow-up       Referral from: No ref. provider found.    History of Present Illness:  Javad Coughlin is a 69 y.o. male who presents to clinic for post op visit.    Doing well.  Had a few clots which resolved.  Will remove mcdowell.  Past Medical History:   Diagnosis Date    COPD (chronic obstructive pulmonary disease)     Diabetes mellitus, type 2     Eczema     Plaque psoriasis     Rosacea        Current Outpatient Medications on File Prior to Visit   Medication Sig Dispense Refill    albuterol (PROVENTIL/VENTOLIN HFA) 90 mcg/actuation inhaler Inhale 2 puffs into the lungs every 6 (six) hours as needed for Wheezing or Shortness of Breath (cough and wheezing). 18 g 5    finasteride (PROSCAR) 5 mg tablet Take 1 tablet (5 mg total) by mouth once daily. 90 tablet 3    fluticasone-salmeterol diskus inhaler 250-50 mcg Inhale 1 puff into the lungs every 12 (twelve) hours. 60 each 5    gabapentin (NEURONTIN) 800 MG tablet Take 1.5 tablets (1,200 mg total) by mouth 2 (two) times daily. 270 tablet 1    HYDROcodone-acetaminophen (NORCO) 5-325 mg per tablet Take 1 tablet by mouth every 6 (six) hours as needed for Pain. 12 tablet 0    irbesartan (AVAPRO) 300 MG tablet Take 1 tablet (300 mg total) by mouth every evening. 30 tablet 0    metFORMIN (GLUCOPHAGE-XR) 500 MG ER 24hr tablet Take 1 tablet (500 mg total) by mouth 2 (two) times daily with meals. 180 tablet 1    mometasone 0.1% (ELOCON) 0.1 % cream       nitrofurantoin, macrocrystal-monohydrate, (MACROBID) 100 MG capsule Take 1 capsule (100 mg total) by mouth 2 (two) times daily. 14 capsule 0    rosuvastatin (CRESTOR) 20 MG tablet Take one tablet by mouth once daily 90 tablet 1    spironolactone (ALDACTONE) 25 MG tablet Take 1 tablet (25 mg total) by mouth once daily. 90 tablet 1    tamsulosin (FLOMAX) 0.4 mg Cap Take 1 capsule (0.4 mg total) by mouth once daily.  "30 capsule 11    TREMFYA 100 mg/mL AtIn Inject into the skin.       Current Facility-Administered Medications on File Prior to Visit   Medication Dose Route Frequency Provider Last Rate Last Admin    nitrofurantoin (macrocrystal-monohydrate) 100 MG capsule 100 mg  100 mg Oral 1 time in Clinic/HOD Alex Hood MD             OBJECTIVE:     Estimated body mass index is 40.78 kg/m² as calculated from the following:    Height as of 8/29/24: 6' (1.829 m).    Weight as of 8/29/24: 136.4 kg (300 lb 11.3 oz).    Vital Signs (Most Recent)  There were no vitals filed for this visit.    Physical Exam:    Physical Exam     Og cath removed without difficulty    Genitourinary Exam:  UOP Clear      LABS:     Lab Results   Component Value Date    BUN 12 05/29/2024    CREATININE 1.1 05/29/2024    WBC 8.29 01/11/2024    HGB 13.0 (L) 01/11/2024    HCT 39.6 (L) 01/11/2024     01/11/2024    AST 27 05/29/2024    ALT 23 05/29/2024    ALKPHOS 64 05/29/2024    ALBUMIN 3.7 05/29/2024    HGBA1C 7.0 (H) 05/29/2024        Urinalysis:   No results found for: "UAREFLEX"     PSA:  Lab Results   Component Value Date    PSA 0.43 05/29/2024    PSA 0.30 08/22/2023       Testosterone:  No results found for: "TOTALTESTOST", "TESTOSTERONE"     Imaging:  I have personally reviewed all relevant imaging studies.    Results for orders placed or performed during the hospital encounter of 07/20/24 (from the past 2160 hour(s))   CT Renal Stone Study ABD Pelvis WO    Narrative    EXAMINATION:  CT RENAL STONE STUDY ABD PELVIS WO    CLINICAL HISTORY:  Flank pain, kidney stone suspected;    TECHNIQUE:  Low dose axial images, sagittal and coronal reformations were obtained from the lung bases to the pubic symphysis.  Contrast was not administered.    COMPARISON:  CT chest of January 11, 2024    FINDINGS:  The liver is of normal size contour and CT density without focal defect.  The gallbladder is of normal size but contains multiple gallstones.  " Edema or thickening of the gallbladder wall is not seen.  The pancreas is of normal contour and CT density without edema or mass.  The spleen is of normal size and CT density.    The adrenal glands are not enlarged.  The kidneys are of normal size contour and CT density for a noncontrast study.  There is a small cyst identified of the left kidney measuring 1.4 cm.  And a small cyst at the lower pole of the left kidney measuring 1.4 cm.  Stone or hydronephrosis is not seen on either side.  The abdominal aorta and inferior vena cava are of normal caliber.    The stomach is of normal configuration.  Small bowel dilatation or air-fluid levels are not seen.  There is diverticulosis noted in the descending and sigmoid colon without CT evidence of diverticulitis.  A normal appendix is noted.  Free fluid or free air is not seen.    The bladder is of normal size however there is a soft tissue density mass in the right side of the bladder measuring 2.6 cm.  A neoplasm is suspected.  The prostate is not enlarged.      Impression    2.6 cm mass in the right posterior bladder wall with neoplasm suspected.  Cholelithiasis.  Two small cyst of the left kidney without hydronephrosis.  Diverticulosis coli.      Electronically signed by: Anil Leon MD  Date:    07/20/2024  Time:    16:22     No results found for this or any previous visit (from the past 2160 hour(s)).  CT Renal Stone Study ABD Pelvis WO  Narrative: EXAMINATION:  CT RENAL STONE STUDY ABD PELVIS WO    CLINICAL HISTORY:  Flank pain, kidney stone suspected;    TECHNIQUE:  Low dose axial images, sagittal and coronal reformations were obtained from the lung bases to the pubic symphysis.  Contrast was not administered.    COMPARISON:  CT chest of January 11, 2024    FINDINGS:  The liver is of normal size contour and CT density without focal defect.  The gallbladder is of normal size but contains multiple gallstones.  Edema or thickening of the gallbladder wall is not  seen.  The pancreas is of normal contour and CT density without edema or mass.  The spleen is of normal size and CT density.    The adrenal glands are not enlarged.  The kidneys are of normal size contour and CT density for a noncontrast study.  There is a small cyst identified of the left kidney measuring 1.4 cm.  And a small cyst at the lower pole of the left kidney measuring 1.4 cm.  Stone or hydronephrosis is not seen on either side.  The abdominal aorta and inferior vena cava are of normal caliber.    The stomach is of normal configuration.  Small bowel dilatation or air-fluid levels are not seen.  There is diverticulosis noted in the descending and sigmoid colon without CT evidence of diverticulitis.  A normal appendix is noted.  Free fluid or free air is not seen.    The bladder is of normal size however there is a soft tissue density mass in the right side of the bladder measuring 2.6 cm.  A neoplasm is suspected.  The prostate is not enlarged.  Impression: 2.6 cm mass in the right posterior bladder wall with neoplasm suspected.  Cholelithiasis.  Two small cyst of the left kidney without hydronephrosis.  Diverticulosis coli.    Electronically signed by: Anil Leon MD  Date:    07/20/2024  Time:    16:22         ASSESSMENT     1. Bladder mass        PLAN:     RTC one week to review pathology    Alex Hood MD  Urology  [facility]     Disclaimer: This note has been generated using voice-recognition software. There may be typographical errors that have been missed during proof-reading.

## 2024-10-01 LAB
FINAL PATHOLOGIC DIAGNOSIS: NORMAL
GROSS: NORMAL
Lab: NORMAL

## 2024-10-02 ENCOUNTER — OFFICE VISIT (OUTPATIENT)
Dept: INTERNAL MEDICINE | Facility: CLINIC | Age: 69
End: 2024-10-02
Payer: MEDICARE

## 2024-10-02 VITALS
BODY MASS INDEX: 41.66 KG/M2 | OXYGEN SATURATION: 96 % | HEIGHT: 72 IN | WEIGHT: 307.56 LBS | HEART RATE: 102 BPM | RESPIRATION RATE: 20 BRPM | DIASTOLIC BLOOD PRESSURE: 54 MMHG | SYSTOLIC BLOOD PRESSURE: 104 MMHG

## 2024-10-02 DIAGNOSIS — J44.9 CHRONIC OBSTRUCTIVE PULMONARY DISEASE, UNSPECIFIED COPD TYPE: ICD-10-CM

## 2024-10-02 DIAGNOSIS — E66.01 CLASS 3 SEVERE OBESITY DUE TO EXCESS CALORIES WITH SERIOUS COMORBIDITY AND BODY MASS INDEX (BMI) OF 40.0 TO 44.9 IN ADULT: ICD-10-CM

## 2024-10-02 DIAGNOSIS — I10 PRIMARY HYPERTENSION: ICD-10-CM

## 2024-10-02 DIAGNOSIS — C67.8 MALIGNANT NEOPLASM OF OVERLAPPING SITES OF BLADDER: ICD-10-CM

## 2024-10-02 DIAGNOSIS — E66.813 CLASS 3 SEVERE OBESITY DUE TO EXCESS CALORIES WITH SERIOUS COMORBIDITY AND BODY MASS INDEX (BMI) OF 40.0 TO 44.9 IN ADULT: ICD-10-CM

## 2024-10-02 DIAGNOSIS — E11.65 TYPE 2 DIABETES MELLITUS WITH HYPERGLYCEMIA, WITHOUT LONG-TERM CURRENT USE OF INSULIN: Primary | ICD-10-CM

## 2024-10-02 LAB — GLUCOSE SERPL-MCNC: 275 MG/DL (ref 70–110)

## 2024-10-02 PROCEDURE — 3074F SYST BP LT 130 MM HG: CPT | Mod: CPTII,S$GLB,, | Performed by: NURSE PRACTITIONER

## 2024-10-02 PROCEDURE — 3288F FALL RISK ASSESSMENT DOCD: CPT | Mod: CPTII,S$GLB,, | Performed by: NURSE PRACTITIONER

## 2024-10-02 PROCEDURE — 4010F ACE/ARB THERAPY RXD/TAKEN: CPT | Mod: CPTII,S$GLB,, | Performed by: NURSE PRACTITIONER

## 2024-10-02 PROCEDURE — 3061F NEG MICROALBUMINURIA REV: CPT | Mod: CPTII,S$GLB,, | Performed by: NURSE PRACTITIONER

## 2024-10-02 PROCEDURE — 82962 GLUCOSE BLOOD TEST: CPT | Mod: S$GLB,,, | Performed by: NURSE PRACTITIONER

## 2024-10-02 PROCEDURE — 1159F MED LIST DOCD IN RCRD: CPT | Mod: CPTII,S$GLB,, | Performed by: NURSE PRACTITIONER

## 2024-10-02 PROCEDURE — 1101F PT FALLS ASSESS-DOCD LE1/YR: CPT | Mod: CPTII,S$GLB,, | Performed by: NURSE PRACTITIONER

## 2024-10-02 PROCEDURE — 3078F DIAST BP <80 MM HG: CPT | Mod: CPTII,S$GLB,, | Performed by: NURSE PRACTITIONER

## 2024-10-02 PROCEDURE — 3066F NEPHROPATHY DOC TX: CPT | Mod: CPTII,S$GLB,, | Performed by: NURSE PRACTITIONER

## 2024-10-02 PROCEDURE — 1125F AMNT PAIN NOTED PAIN PRSNT: CPT | Mod: CPTII,S$GLB,, | Performed by: NURSE PRACTITIONER

## 2024-10-02 PROCEDURE — 3051F HG A1C>EQUAL 7.0%<8.0%: CPT | Mod: CPTII,S$GLB,, | Performed by: NURSE PRACTITIONER

## 2024-10-02 PROCEDURE — 3008F BODY MASS INDEX DOCD: CPT | Mod: CPTII,S$GLB,, | Performed by: NURSE PRACTITIONER

## 2024-10-02 PROCEDURE — 99214 OFFICE O/P EST MOD 30 MIN: CPT | Mod: S$GLB,,, | Performed by: NURSE PRACTITIONER

## 2024-10-02 PROCEDURE — 99999 PR PBB SHADOW E&M-EST. PATIENT-LVL III: CPT | Mod: PBBFAC,,, | Performed by: NURSE PRACTITIONER

## 2024-10-02 RX ORDER — IRBESARTAN 300 MG/1
150 TABLET ORAL NIGHTLY
Qty: 30 TABLET | Refills: 0 | Status: SHIPPED | OUTPATIENT
Start: 2024-10-02 | End: 2025-10-02

## 2024-10-02 RX ORDER — SEMAGLUTIDE 0.68 MG/ML
0.25 INJECTION, SOLUTION SUBCUTANEOUS
Qty: 4 EACH | Refills: 0 | Status: SHIPPED | OUTPATIENT
Start: 2024-10-02

## 2024-10-02 NOTE — PROGRESS NOTES
Subjective:       Patient ID: Javad Coughlin is a 69 y.o. male.    Chief Complaint: Establish Care and Follow-up (1 month)    HPI: Pt presents to clinic today new to me but was seeing kt- he is here to establish care with me since Kt is gone. He report that he is unsure what he took this morning. He has bene taking 150 BID due to when he was taking 300 he was running too low.     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     He is diabetic on metformin but only taking daily   Lab Results   Component Value Date    HGBA1C 7.0 (H) 05/29/2024     BMP  Lab Results   Component Value Date     (L) 05/29/2024    K 4.4 05/29/2024     05/29/2024    CO2 26 05/29/2024    BUN 12 05/29/2024    CREATININE 1.1 05/29/2024    CALCIUM 9.1 05/29/2024    ANIONGAP 7 (L) 05/29/2024    EGFRNORACEVR >60.0 05/29/2024       Review of Systems   Constitutional:  Negative for chills and fever.   HENT:  Negative for congestion, postnasal drip and sore throat.    Eyes:  Negative for photophobia.   Respiratory:  Negative for chest tightness and shortness of breath.    Cardiovascular:  Negative for chest pain.   Gastrointestinal:  Negative for abdominal distention, abdominal pain, blood in stool and vomiting.   Genitourinary:  Negative for dysuria, flank pain and hematuria.   Musculoskeletal:  Negative for back pain.   Skin:  Negative for pallor.   Neurological:  Negative for dizziness, seizures, facial asymmetry, speech difficulty and numbness.   Hematological:  Does not bruise/bleed easily.   Psychiatric/Behavioral:  Negative for agitation and suicidal ideas. The patient is not nervous/anxious.        Objective:      Physical Exam  Vitals and nursing note reviewed.   Constitutional:        "Appearance: Normal appearance. He is well-developed. He is obese.   HENT:      Head: Normocephalic and atraumatic.   Neck:      Vascular: No JVD.   Cardiovascular:      Rate and Rhythm: Normal rate and regular rhythm.      Heart sounds: Normal heart sounds. No murmur heard.  Pulmonary:      Effort: Pulmonary effort is normal. No respiratory distress.      Breath sounds: Normal breath sounds. No wheezing.   Abdominal:      General: Bowel sounds are normal.      Palpations: Abdomen is soft.      Tenderness: There is no abdominal tenderness.   Musculoskeletal:         General: No swelling. Normal range of motion.   Skin:     General: Skin is warm and dry.   Neurological:      Mental Status: He is alert and oriented to person, place, and time.   Psychiatric:         Behavior: Behavior normal.         Thought Content: Thought content normal.         Judgment: Judgment normal.         Assessment:       1. Type 2 diabetes mellitus with hyperglycemia, without long-term current use of insulin    2. Primary hypertension    3. Chronic obstructive pulmonary disease, unspecified COPD type    4. Class 3 severe obesity due to excess calories with serious comorbidity and body mass index (BMI) of 40.0 to 44.9 in adult    5. Malignant neoplasm of overlapping sites of bladder        Plan:     Problem List Items Addressed This Visit       Primary hypertension    Overview     12/21/22 Dx approx 10 yrs ago   Dx - 10yrs,  " off and on" , irbesartan - 150mg Rx    2/8/23 cardiology evaluation with Dr. Segura   Primary hypertension - Primary        Aldactone 25 mg added to ARB  for better blood pressure control an attempt at improvement in shortness of breath.  There could be a component of diastolic heart failure.           Irbesartan 150mg daily  Aldactone 25mg po daily   Cardiology records noted;          Relevant Medications    irbesartan (AVAPRO) 300 MG tablet> decrease to 150mg daily     COPD (chronic obstructive pulmonary disease) " "   Overview     Notes emphysema, quits smoking years ago, 4 years   Had PFTs years ago   Repeat PFTs- 12/23/22 showing Moderate obstruction.      2/14/23 Per pulmonary Patient with obstructive lung disease and decreased diffusion concerning for COPD with typical symptoms. picture is consistent with COPD (asthmatic variant vs emphysema).    He has not been on a controller medication, only rescue inhalers and is GOLD B. Will recommends starting LABA/ICS due to the reversibility associated with PFT in Dec 2022. We will plan for continuing a controller medication for at least six weeks and follow up with repeat PFTs, CT Chest.  CT for lung cancer screening (in addition to evaluating for asbestos risk).   - Advair 250 BID prescribed  Ventolin 2 p q6 prn   - follow up in 6-8 weeks    - PFTs w/ bronchodilator    - CT Chest non-contrast         Class 3 severe obesity due to excess calories with serious comorbidity and body mass index (BMI) of 40.0 to 44.9 in adult    Overview     Wt Readings from Last 3 Encounters:   08/22/23 1418 (!) 140.8 kg (310 lb 6.5 oz)   08/22/23 1414 (!) 140.8 kg (310 lb 6.5 oz)   03/30/23 1513 133 kg (293 lb 3.4 oz)     Think this is a large component of HOOPER  Resume ozempic         Malignant neoplasm of overlapping sites of bladder   Cont f/u with maynormen  Other Visit Diagnoses       Type 2 diabetes mellitus with hyperglycemia, without long-term current use of insulin    -  Primary    Relevant Medications    semaglutide (OZEMPIC) 0.25 mg or 0.5 mg (2 mg/3 mL) pen injector            He was on ozempic and doing well but then "insurance stopped covering it"??? He is uncontrolled DM ? Donut hole?? Will call pharamcy and check coverage. Already schedled for labs 11/2024 which would be routine 6 months     11/2020 has colonoscopy was suppose to repeat in 5 years which is 2025       Also waiting on ENT for nasal polyp treatment   "

## 2024-10-10 ENCOUNTER — OFFICE VISIT (OUTPATIENT)
Dept: UROLOGY | Facility: CLINIC | Age: 69
End: 2024-10-10
Attending: SPECIALIST
Payer: MEDICARE

## 2024-10-10 DIAGNOSIS — C67.2 MALIGNANT NEOPLASM OF LATERAL WALL OF URINARY BLADDER: Primary | ICD-10-CM

## 2024-10-10 PROCEDURE — 3066F NEPHROPATHY DOC TX: CPT | Mod: CPTII,S$GLB,, | Performed by: SPECIALIST

## 2024-10-10 PROCEDURE — 99999 PR PBB SHADOW E&M-EST. PATIENT-LVL I: CPT | Mod: PBBFAC,,, | Performed by: SPECIALIST

## 2024-10-10 PROCEDURE — 1159F MED LIST DOCD IN RCRD: CPT | Mod: CPTII,S$GLB,, | Performed by: SPECIALIST

## 2024-10-10 PROCEDURE — 99214 OFFICE O/P EST MOD 30 MIN: CPT | Mod: S$GLB,,, | Performed by: SPECIALIST

## 2024-10-10 PROCEDURE — 1160F RVW MEDS BY RX/DR IN RCRD: CPT | Mod: CPTII,S$GLB,, | Performed by: SPECIALIST

## 2024-10-10 PROCEDURE — 3061F NEG MICROALBUMINURIA REV: CPT | Mod: CPTII,S$GLB,, | Performed by: SPECIALIST

## 2024-10-10 PROCEDURE — 4010F ACE/ARB THERAPY RXD/TAKEN: CPT | Mod: CPTII,S$GLB,, | Performed by: SPECIALIST

## 2024-10-10 PROCEDURE — 3051F HG A1C>EQUAL 7.0%<8.0%: CPT | Mod: CPTII,S$GLB,, | Performed by: SPECIALIST

## 2024-10-10 NOTE — PROGRESS NOTES
[facility] - Urology   Clinic Note    SUBJECTIVE:     Chief Complaint   Patient presents with    Results     Biopsy        Referral from: No ref. provider found.    History of Present Illness:  Javad Coughlin is a 69 y.o. male who presents to clinic for postop check.    He is doing quite well after his TURBT.  His hematuria has resolved.  Pathology was discussed:    pTa, low grade TCC    I explained that he has low-grade bladder cancer.  I would favor a surveillance cystoscopic exam in 3 months as there may be a few questionable areas which may develop into papillary tumors within the next few months.  We can then advance his intervals of surveillance thereafter.  All questions were answered.  Past Medical History:   Diagnosis Date    COPD (chronic obstructive pulmonary disease)     Diabetes mellitus, type 2     Eczema     Plaque psoriasis     Rosacea        Current Outpatient Medications on File Prior to Visit   Medication Sig Dispense Refill    albuterol (PROVENTIL/VENTOLIN HFA) 90 mcg/actuation inhaler Inhale 2 puffs into the lungs every 6 (six) hours as needed for Wheezing or Shortness of Breath (cough and wheezing). 18 g 5    finasteride (PROSCAR) 5 mg tablet Take 1 tablet (5 mg total) by mouth once daily. 90 tablet 3    fluticasone-salmeterol diskus inhaler 250-50 mcg Inhale 1 puff into the lungs every 12 (twelve) hours. (Patient taking differently: Inhale 1 puff into the lungs every 12 (twelve) hours. WIXELA) 60 each 5    gabapentin (NEURONTIN) 800 MG tablet Take 1.5 tablets (1,200 mg total) by mouth 2 (two) times daily. 270 tablet 1    HYDROcodone-acetaminophen (NORCO) 5-325 mg per tablet Take 1 tablet by mouth every 6 (six) hours as needed for Pain. (Patient not taking: Reported on 10/2/2024) 12 tablet 0    irbesartan (AVAPRO) 300 MG tablet Take 0.5 tablets (150 mg total) by mouth every evening. 30 tablet 0    metFORMIN (GLUCOPHAGE-XR) 500 MG ER 24hr tablet Take 1 tablet (500 mg total) by  mouth 2 (two) times daily with meals. (Patient taking differently: Take 500 mg by mouth once daily.) 180 tablet 1    mometasone 0.1% (ELOCON) 0.1 % cream       nitrofurantoin, macrocrystal-monohydrate, (MACROBID) 100 MG capsule Take 1 capsule (100 mg total) by mouth 2 (two) times daily. 14 capsule 0    rosuvastatin (CRESTOR) 20 MG tablet Take one tablet by mouth once daily 90 tablet 1    semaglutide (OZEMPIC) 0.25 mg or 0.5 mg (2 mg/3 mL) pen injector Inject 0.25 mg into the skin every 7 days. 4 each 0    spironolactone (ALDACTONE) 25 MG tablet Take 1 tablet (25 mg total) by mouth once daily. 90 tablet 1    tamsulosin (FLOMAX) 0.4 mg Cap Take 1 capsule (0.4 mg total) by mouth once daily. 30 capsule 11    TREMFYA 100 mg/mL AtIn Inject into the skin.       Current Facility-Administered Medications on File Prior to Visit   Medication Dose Route Frequency Provider Last Rate Last Admin    nitrofurantoin (macrocrystal-monohydrate) 100 MG capsule 100 mg  100 mg Oral 1 time in Clinic/HOD Alex Hood MD             OBJECTIVE:     Estimated body mass index is 41.71 kg/m² as calculated from the following:    Height as of 10/2/24: 6' (1.829 m).    Weight as of 10/2/24: 139.5 kg (307 lb 8.7 oz).    Vital Signs (Most Recent)  There were no vitals filed for this visit.    Physical Exam:    Physical Exam     GENERAL: patient sitting comfortably  HEENT: normocephalic  NECK: supple, no JVD  PULM: normal chest rise, no increased WOB  HEART: non-diaphoretic  ABDO: soft, nondistended, nontender  BACK: no CVA tenderness bilaterally  SKIN: warm, dry, well perfused  EXT: no bruising or edema  NEURO: grossly normal with no focal deficits  PSYCH: appropriate mood and affect    Genitourinary Exam:  deferred      LABS:     Lab Results   Component Value Date    BUN 12 05/29/2024    CREATININE 1.1 05/29/2024    WBC 8.29 01/11/2024    HGB 13.0 (L) 01/11/2024    HCT 39.6 (L) 01/11/2024     01/11/2024    AST 27 05/29/2024    ALT 23  "05/29/2024    ALKPHOS 64 05/29/2024    ALBUMIN 3.7 05/29/2024    HGBA1C 7.0 (H) 05/29/2024        Urinalysis:   No results found for: "UAREFLEX"     PSA:  Lab Results   Component Value Date    PSA 0.43 05/29/2024    PSA 0.30 08/22/2023       Testosterone:  No results found for: "TOTALTESTOST", "TESTOSTERONE"     Imaging:  I have personally reviewed all relevant imaging studies.    Results for orders placed or performed during the hospital encounter of 07/20/24 (from the past 2160 hours)   CT Renal Stone Study ABD Pelvis WO    Narrative    EXAMINATION:  CT RENAL STONE STUDY ABD PELVIS WO    CLINICAL HISTORY:  Flank pain, kidney stone suspected;    TECHNIQUE:  Low dose axial images, sagittal and coronal reformations were obtained from the lung bases to the pubic symphysis.  Contrast was not administered.    COMPARISON:  CT chest of January 11, 2024    FINDINGS:  The liver is of normal size contour and CT density without focal defect.  The gallbladder is of normal size but contains multiple gallstones.  Edema or thickening of the gallbladder wall is not seen.  The pancreas is of normal contour and CT density without edema or mass.  The spleen is of normal size and CT density.    The adrenal glands are not enlarged.  The kidneys are of normal size contour and CT density for a noncontrast study.  There is a small cyst identified of the left kidney measuring 1.4 cm.  And a small cyst at the lower pole of the left kidney measuring 1.4 cm.  Stone or hydronephrosis is not seen on either side.  The abdominal aorta and inferior vena cava are of normal caliber.    The stomach is of normal configuration.  Small bowel dilatation or air-fluid levels are not seen.  There is diverticulosis noted in the descending and sigmoid colon without CT evidence of diverticulitis.  A normal appendix is noted.  Free fluid or free air is not seen.    The bladder is of normal size however there is a soft tissue density mass in the right side of " the bladder measuring 2.6 cm.  A neoplasm is suspected.  The prostate is not enlarged.      Impression    2.6 cm mass in the right posterior bladder wall with neoplasm suspected.  Cholelithiasis.  Two small cyst of the left kidney without hydronephrosis.  Diverticulosis coli.      Electronically signed by: Anil Leon MD  Date:    07/20/2024  Time:    16:22     No results found for this or any previous visit (from the past 2160 hours).  CT Renal Stone Study ABD Pelvis WO  Narrative: EXAMINATION:  CT RENAL STONE STUDY ABD PELVIS WO    CLINICAL HISTORY:  Flank pain, kidney stone suspected;    TECHNIQUE:  Low dose axial images, sagittal and coronal reformations were obtained from the lung bases to the pubic symphysis.  Contrast was not administered.    COMPARISON:  CT chest of January 11, 2024    FINDINGS:  The liver is of normal size contour and CT density without focal defect.  The gallbladder is of normal size but contains multiple gallstones.  Edema or thickening of the gallbladder wall is not seen.  The pancreas is of normal contour and CT density without edema or mass.  The spleen is of normal size and CT density.    The adrenal glands are not enlarged.  The kidneys are of normal size contour and CT density for a noncontrast study.  There is a small cyst identified of the left kidney measuring 1.4 cm.  And a small cyst at the lower pole of the left kidney measuring 1.4 cm.  Stone or hydronephrosis is not seen on either side.  The abdominal aorta and inferior vena cava are of normal caliber.    The stomach is of normal configuration.  Small bowel dilatation or air-fluid levels are not seen.  There is diverticulosis noted in the descending and sigmoid colon without CT evidence of diverticulitis.  A normal appendix is noted.  Free fluid or free air is not seen.    The bladder is of normal size however there is a soft tissue density mass in the right side of the bladder measuring 2.6 cm.  A neoplasm is  suspected.  The prostate is not enlarged.  Impression: 2.6 cm mass in the right posterior bladder wall with neoplasm suspected.  Cholelithiasis.  Two small cyst of the left kidney without hydronephrosis.  Diverticulosis coli.    Electronically signed by: Anil Leon MD  Date:    07/20/2024  Time:    16:22         ASSESSMENT     1. Malignant neoplasm of lateral wall of urinary bladder        PLAN:     Outpatient surveillance cystoscopic exam 3 months    Alex Hood MD  Urology  [facility]     Disclaimer: This note has been generated using voice-recognition software. There may be typographical errors that have been missed during proof-reading.

## 2024-10-31 ENCOUNTER — LAB VISIT (OUTPATIENT)
Dept: LAB | Facility: HOSPITAL | Age: 69
End: 2024-10-31
Attending: NURSE PRACTITIONER
Payer: MEDICARE

## 2024-10-31 DIAGNOSIS — I10 PRIMARY HYPERTENSION: ICD-10-CM

## 2024-10-31 DIAGNOSIS — E11.9 TYPE 2 DIABETES MELLITUS WITHOUT COMPLICATION, WITHOUT LONG-TERM CURRENT USE OF INSULIN: ICD-10-CM

## 2024-10-31 DIAGNOSIS — E78.00 PURE HYPERCHOLESTEROLEMIA: ICD-10-CM

## 2024-10-31 LAB
ALBUMIN SERPL BCP-MCNC: 3.9 G/DL (ref 3.5–5.2)
ALP SERPL-CCNC: 64 U/L (ref 40–150)
ALT SERPL W/O P-5'-P-CCNC: 22 U/L (ref 10–44)
ANION GAP SERPL CALC-SCNC: 9 MMOL/L (ref 8–16)
AST SERPL-CCNC: 24 U/L (ref 10–40)
BASOPHILS # BLD AUTO: 0.03 K/UL (ref 0–0.2)
BASOPHILS NFR BLD: 0.5 % (ref 0–1.9)
BILIRUB SERPL-MCNC: 0.8 MG/DL (ref 0.1–1)
BUN SERPL-MCNC: 15 MG/DL (ref 8–23)
CALCIUM SERPL-MCNC: 9.2 MG/DL (ref 8.7–10.5)
CHLORIDE SERPL-SCNC: 106 MMOL/L (ref 95–110)
CHOLEST SERPL-MCNC: 114 MG/DL (ref 120–199)
CHOLEST/HDLC SERPL: 3.8 {RATIO} (ref 2–5)
CO2 SERPL-SCNC: 23 MMOL/L (ref 23–29)
CREAT SERPL-MCNC: 1.3 MG/DL (ref 0.5–1.4)
DIFFERENTIAL METHOD BLD: ABNORMAL
EOSINOPHIL # BLD AUTO: 0.1 K/UL (ref 0–0.5)
EOSINOPHIL NFR BLD: 1.1 % (ref 0–8)
ERYTHROCYTE [DISTWIDTH] IN BLOOD BY AUTOMATED COUNT: 14.7 % (ref 11.5–14.5)
EST. GFR  (NO RACE VARIABLE): 60 ML/MIN/1.73 M^2
ESTIMATED AVG GLUCOSE: 143 MG/DL (ref 68–131)
GLUCOSE SERPL-MCNC: 115 MG/DL (ref 70–110)
HBA1C MFR BLD: 6.6 % (ref 4–5.6)
HCT VFR BLD AUTO: 38.7 % (ref 40–54)
HDLC SERPL-MCNC: 30 MG/DL (ref 40–75)
HDLC SERPL: 26.3 % (ref 20–50)
HGB BLD-MCNC: 13 G/DL (ref 14–18)
IMM GRANULOCYTES # BLD AUTO: 0.02 K/UL (ref 0–0.04)
IMM GRANULOCYTES NFR BLD AUTO: 0.4 % (ref 0–0.5)
LDLC SERPL CALC-MCNC: 44.2 MG/DL (ref 63–159)
LYMPHOCYTES # BLD AUTO: 1.6 K/UL (ref 1–4.8)
LYMPHOCYTES NFR BLD: 29.1 % (ref 18–48)
MCH RBC QN AUTO: 28.8 PG (ref 27–31)
MCHC RBC AUTO-ENTMCNC: 33.6 G/DL (ref 32–36)
MCV RBC AUTO: 86 FL (ref 82–98)
MONOCYTES # BLD AUTO: 0.4 K/UL (ref 0.3–1)
MONOCYTES NFR BLD: 7.9 % (ref 4–15)
NEUTROPHILS # BLD AUTO: 3.4 K/UL (ref 1.8–7.7)
NEUTROPHILS NFR BLD: 61 % (ref 38–73)
NONHDLC SERPL-MCNC: 84 MG/DL
NRBC BLD-RTO: 0 /100 WBC
PLATELET # BLD AUTO: 169 K/UL (ref 150–450)
PMV BLD AUTO: 9.3 FL (ref 9.2–12.9)
POTASSIUM SERPL-SCNC: 4.4 MMOL/L (ref 3.5–5.1)
PROT SERPL-MCNC: 7 G/DL (ref 6–8.4)
RBC # BLD AUTO: 4.51 M/UL (ref 4.6–6.2)
SODIUM SERPL-SCNC: 138 MMOL/L (ref 136–145)
TRIGL SERPL-MCNC: 199 MG/DL (ref 30–150)
WBC # BLD AUTO: 5.57 K/UL (ref 3.9–12.7)

## 2024-10-31 PROCEDURE — 80053 COMPREHEN METABOLIC PANEL: CPT | Performed by: NURSE PRACTITIONER

## 2024-10-31 PROCEDURE — 36415 COLL VENOUS BLD VENIPUNCTURE: CPT | Performed by: NURSE PRACTITIONER

## 2024-10-31 PROCEDURE — 85025 COMPLETE CBC W/AUTO DIFF WBC: CPT | Performed by: NURSE PRACTITIONER

## 2024-10-31 PROCEDURE — 80061 LIPID PANEL: CPT | Performed by: NURSE PRACTITIONER

## 2024-10-31 PROCEDURE — 83036 HEMOGLOBIN GLYCOSYLATED A1C: CPT | Performed by: NURSE PRACTITIONER

## 2024-11-05 DIAGNOSIS — I10 PRIMARY HYPERTENSION: ICD-10-CM

## 2024-11-05 RX ORDER — IRBESARTAN 300 MG/1
150 TABLET ORAL NIGHTLY
Qty: 45 TABLET | Refills: 1 | Status: SHIPPED | OUTPATIENT
Start: 2024-11-05

## 2024-11-05 NOTE — TELEPHONE ENCOUNTER
Please see the attached refill request. Please see pharmacy comment:  REQUEST FOR 90 DAYS PRESCRIPTION. DX Code Needed.

## 2024-11-12 ENCOUNTER — OFFICE VISIT (OUTPATIENT)
Dept: INTERNAL MEDICINE | Facility: CLINIC | Age: 69
End: 2024-11-12
Payer: MEDICARE

## 2024-11-12 VITALS
RESPIRATION RATE: 20 BRPM | BODY MASS INDEX: 41.33 KG/M2 | OXYGEN SATURATION: 96 % | HEART RATE: 72 BPM | HEIGHT: 72 IN | WEIGHT: 305.13 LBS | SYSTOLIC BLOOD PRESSURE: 108 MMHG | DIASTOLIC BLOOD PRESSURE: 68 MMHG

## 2024-11-12 DIAGNOSIS — I10 PRIMARY HYPERTENSION: ICD-10-CM

## 2024-11-12 DIAGNOSIS — G57.93 NEUROPATHY OF BOTH FEET: ICD-10-CM

## 2024-11-12 DIAGNOSIS — E66.813 CLASS 3 SEVERE OBESITY DUE TO EXCESS CALORIES WITH SERIOUS COMORBIDITY AND BODY MASS INDEX (BMI) OF 40.0 TO 44.9 IN ADULT: ICD-10-CM

## 2024-11-12 DIAGNOSIS — E11.9 TYPE 2 DIABETES MELLITUS WITHOUT COMPLICATION, WITHOUT LONG-TERM CURRENT USE OF INSULIN: ICD-10-CM

## 2024-11-12 DIAGNOSIS — E78.00 PURE HYPERCHOLESTEROLEMIA: ICD-10-CM

## 2024-11-12 DIAGNOSIS — E66.01 CLASS 3 SEVERE OBESITY DUE TO EXCESS CALORIES WITH SERIOUS COMORBIDITY AND BODY MASS INDEX (BMI) OF 40.0 TO 44.9 IN ADULT: ICD-10-CM

## 2024-11-12 DIAGNOSIS — N40.0 BENIGN PROSTATIC HYPERPLASIA WITHOUT LOWER URINARY TRACT SYMPTOMS: Primary | ICD-10-CM

## 2024-11-12 DIAGNOSIS — C67.8 MALIGNANT NEOPLASM OF OVERLAPPING SITES OF BLADDER: ICD-10-CM

## 2024-11-12 DIAGNOSIS — J44.9 CHRONIC OBSTRUCTIVE PULMONARY DISEASE, UNSPECIFIED COPD TYPE: ICD-10-CM

## 2024-11-12 PROCEDURE — 1125F AMNT PAIN NOTED PAIN PRSNT: CPT | Mod: CPTII,S$GLB,, | Performed by: NURSE PRACTITIONER

## 2024-11-12 PROCEDURE — 3044F HG A1C LEVEL LT 7.0%: CPT | Mod: CPTII,S$GLB,, | Performed by: NURSE PRACTITIONER

## 2024-11-12 PROCEDURE — 3074F SYST BP LT 130 MM HG: CPT | Mod: CPTII,S$GLB,, | Performed by: NURSE PRACTITIONER

## 2024-11-12 PROCEDURE — 3078F DIAST BP <80 MM HG: CPT | Mod: CPTII,S$GLB,, | Performed by: NURSE PRACTITIONER

## 2024-11-12 PROCEDURE — 4010F ACE/ARB THERAPY RXD/TAKEN: CPT | Mod: CPTII,S$GLB,, | Performed by: NURSE PRACTITIONER

## 2024-11-12 PROCEDURE — 1159F MED LIST DOCD IN RCRD: CPT | Mod: CPTII,S$GLB,, | Performed by: NURSE PRACTITIONER

## 2024-11-12 PROCEDURE — 3061F NEG MICROALBUMINURIA REV: CPT | Mod: CPTII,S$GLB,, | Performed by: NURSE PRACTITIONER

## 2024-11-12 PROCEDURE — 3008F BODY MASS INDEX DOCD: CPT | Mod: CPTII,S$GLB,, | Performed by: NURSE PRACTITIONER

## 2024-11-12 PROCEDURE — 1101F PT FALLS ASSESS-DOCD LE1/YR: CPT | Mod: CPTII,S$GLB,, | Performed by: NURSE PRACTITIONER

## 2024-11-12 PROCEDURE — 3288F FALL RISK ASSESSMENT DOCD: CPT | Mod: CPTII,S$GLB,, | Performed by: NURSE PRACTITIONER

## 2024-11-12 PROCEDURE — 1160F RVW MEDS BY RX/DR IN RCRD: CPT | Mod: CPTII,S$GLB,, | Performed by: NURSE PRACTITIONER

## 2024-11-12 PROCEDURE — 3066F NEPHROPATHY DOC TX: CPT | Mod: CPTII,S$GLB,, | Performed by: NURSE PRACTITIONER

## 2024-11-12 PROCEDURE — 99214 OFFICE O/P EST MOD 30 MIN: CPT | Mod: S$GLB,,, | Performed by: NURSE PRACTITIONER

## 2024-11-12 PROCEDURE — 99999 PR PBB SHADOW E&M-EST. PATIENT-LVL III: CPT | Mod: PBBFAC,,, | Performed by: NURSE PRACTITIONER

## 2024-11-12 NOTE — PROGRESS NOTES
Subjective:       Patient ID: Javad Coughlin is a 69 y.o. male.    Chief Complaint: Follow-up      History of Present Illness    CHIEF COMPLAINT:  Javad presents for a follow-up visit to discuss recent lab results and medication management.    HPI:  Javad is following up after 1 month for review of labs results. He recently underwent surgery for a bladder mass, performed by Dr. Hood, a urologist. TURP The surgeon informed the patient of a 60% chance of polyp recurrence. Javad reports a history of hematuria, which he attributes to previous cancer. He also reports ongoing anemia, unchanged from 10 months ago. Javad expresses concern about dyspnea, particularly during physical exertion. He report that he is very deconditioned. Regarding medication, the patient restarted Ozempic 2 weeks ago after a period of discontinuation. He has been administering 0.25mg doses weekly for 6 weeks.    MEDICATIONS:  Javad is on Ozempic 0.25 mg, administered as a once weekly injection for blood sugar control, which he restarted 2 weeks ago. He is also on Metformin for blood sugar control, though he reported poor tolerance to this medication.    MEDICAL HISTORY:  Javad has a history of anemia and bladder cancer.    A colonoscopy is due for next year.    TEST RESULTS:  Javad's A1C was 7.0 six months ago and improved to 6.6 two weeks ago. Two weeks ago, his total cholesterol was 114, triglycerides were 199, HDL was 30, and LDL was 44. Blood counts from 10 months ago and 2 weeks ago indicate anemia, which remains unchanged.      ROS:  Respiratory: +shortness of breath          Objective:      Physical Exam    General: No acute distress. Well-developed. Well-nourished.  Eyes: EOMI. Sclerae anicteric.  HENT: Normocephalic. Atraumatic. Nares patent. Moist oral mucosa.  Ears: Bilateral TMs clear. Bilateral EACs clear.  Cardiovascular: Regular rate. Regular rhythm. No murmurs. No rubs. No gallops. Normal S1,  "S2.  Respiratory: Normal respiratory effort. Clear to auscultation bilaterally. No rales. No rhonchi. No wheezing.  Abdomen: Soft. Non-tender. Non-distended. Normoactive bowel sounds.  Musculoskeletal: No  obvious deformity.  Extremities: No lower extremity edema.  Neurological: Alert & oriented x3. No slurred speech. Normal gait.  Psychiatric: Normal mood. Normal affect. Good insight. Good judgment.  Skin: Warm. Dry. No rash.          Assessment:       1. Benign prostatic hyperplasia without lower urinary tract symptoms    2. Class 3 severe obesity due to excess calories with serious comorbidity and body mass index (BMI) of 40.0 to 44.9 in adult    3. Chronic obstructive pulmonary disease, unspecified COPD type    4. Malignant neoplasm of overlapping sites of bladder    5. Neuropathy of both feet    6. Primary hypertension    7. Pure hypercholesterolemia    8. Type 2 diabetes mellitus without complication, without long-term current use of insulin        Plan:     Problem List Items Addressed This Visit       Type 2 diabetes mellitus without complication, without long-term current use of insulin    Overview     12/21/22 Prior Dx 3 yrs ago; tx with Ozempic- was getting samples   Type 2 diabetes mellitus without complications  diet  Losing weight   Metformin ER 500mg daily   Notes he was 318 down to 301  12/22/22 urine MA normal   ARB  Statin          Primary hypertension    Overview     12/21/22 Dx approx 10 yrs ago   Dx - 10yrs,  " off and on" , irbesartan - 150mg Rx    2/8/23 cardiology evaluation with Dr. Segura   Primary hypertension - Primary        Aldactone 25 mg added to ARB  for better blood pressure control an attempt at improvement in shortness of breath.  There could be a component of diastolic heart failure.           Irbesartan 150mg daily  Aldactone 25mg po daily   Cardiology records noted;          Benign prostatic hyperplasia without lower urinary tract symptoms - Primary    Overview     " Established with Dx- follows with urology   flomax  Finasteride 5mg daily          Pure hypercholesterolemia    Overview     Dx ~ 3 yrs ago with DM2    Rosuvastatin 20mg          COPD (chronic obstructive pulmonary disease)    Overview     Notes emphysema, quits smoking years ago, 4 years   Had PFTs years ago   Repeat PFTs- 12/23/22 showing Moderate obstruction.      He is ibly using albuterol as needed- was in Wixela but stopped on own         Class 3 severe obesity due to excess calories with serious comorbidity and body mass index (BMI) of 40.0 to 44.9 in adult    Overview     Wt Readings from Last 3 Encounters:   08/22/23 1418 (!) 140.8 kg (310 lb 6.5 oz)   08/22/23 1414 (!) 140.8 kg (310 lb 6.5 oz)   03/30/23 1513 133 kg (293 lb 3.4 oz)     Think this is a large component of HOOPER           Neuropathy of both feet    Overview     1/23 Gabapentin for neuropathy- bilateral feet ; dx 4- 5 yrs ago   800mg tid- takes 1.5 in am and 1.5 at night          Malignant neoplasm of overlapping sites of bladder     Assessment & Plan    Reviewed A1C results, noting improvement from 7.0 to 6.6 over past 6 months  Evaluated lipid panel: total cholesterol 114, triglycerides 199, HDL 30, LDL 44  Assessed anemia status, noting no significant change from 10 months ago  Considered recent urological surgery for bladder mass removal, expecting potential improvement in blood counts    DIABETES:  Explained A1C as a 3-month average blood sugar measurement.  Continued Ozempic but increased to 0.5mg weekly .  Clarified correct dosing schedule: 0.25 mg weekly for 4 weeks- done, now increase to 0.5 mg weekly.  Provided sample pen and additional needles to bridge until January when insurance coverage changes.    HYPERLIPIDEMIA:  Discussed cholesterol results, emphasizing the importance of low LDL levels.  Clarified that triglycerides become more concerning when they reach 400-500 range.  Explained the relationship between HDL and LDL  cholesterol.  Started cholesterol medication.    MEDICATIONS/SUPPLEMENTS:  Javad to take fish oil supplements.    WEIGHT MANAGEMENT:  Recommend increasing exercise.    FOLLOW UP:  Follow up in 6 months.  Repeat bloodwork at next follow-up visit.          This note was generated with the assistance of ambient listening technology. Verbal consent was obtained by the patient and accompanying visitor(s) for the recording of patient appointment to facilitate this note. I attest to having reviewed and edited the generated note for accuracy, though some syntax or spelling errors may persist. Please contact the author of this note for any clarification.       No changes except to cont ozempic but 0.25>0.5mg weekly. F/u labs 6 months

## 2024-12-06 DIAGNOSIS — E11.65 TYPE 2 DIABETES MELLITUS WITH HYPERGLYCEMIA, WITHOUT LONG-TERM CURRENT USE OF INSULIN: ICD-10-CM

## 2024-12-06 RX ORDER — SEMAGLUTIDE 0.68 MG/ML
0.25 INJECTION, SOLUTION SUBCUTANEOUS
Qty: 3 EACH | Refills: 2 | Status: SHIPPED | OUTPATIENT
Start: 2024-12-06

## 2025-01-10 ENCOUNTER — TELEPHONE (OUTPATIENT)
Dept: UROLOGY | Facility: CLINIC | Age: 70
End: 2025-01-10
Payer: MEDICARE

## 2025-01-10 DIAGNOSIS — N40.0 BENIGN PROSTATIC HYPERPLASIA WITHOUT LOWER URINARY TRACT SYMPTOMS: Primary | ICD-10-CM

## 2025-01-10 NOTE — TELEPHONE ENCOUNTER
Pamela,  Please call patient to re-schedule appointment for cystoscopy and let him know that bladder tumors reoccur and follow up (survellance) cystos are important,  Thanks, MS

## 2025-01-30 ENCOUNTER — PROCEDURE VISIT (OUTPATIENT)
Dept: UROLOGY | Facility: CLINIC | Age: 70
End: 2025-01-30
Attending: SPECIALIST
Payer: MEDICARE

## 2025-01-30 ENCOUNTER — OFFICE VISIT (OUTPATIENT)
Dept: INTERNAL MEDICINE | Facility: CLINIC | Age: 70
End: 2025-01-30
Payer: MEDICARE

## 2025-01-30 VITALS
BODY MASS INDEX: 41.11 KG/M2 | OXYGEN SATURATION: 96 % | RESPIRATION RATE: 20 BRPM | DIASTOLIC BLOOD PRESSURE: 62 MMHG | HEIGHT: 72 IN | SYSTOLIC BLOOD PRESSURE: 130 MMHG | WEIGHT: 303.56 LBS | HEART RATE: 105 BPM

## 2025-01-30 DIAGNOSIS — E11.65 TYPE 2 DIABETES MELLITUS WITH HYPERGLYCEMIA, WITHOUT LONG-TERM CURRENT USE OF INSULIN: ICD-10-CM

## 2025-01-30 DIAGNOSIS — C67.8 MALIGNANT NEOPLASM OF OVERLAPPING SITES OF BLADDER: ICD-10-CM

## 2025-01-30 DIAGNOSIS — J43.8 OTHER EMPHYSEMA: ICD-10-CM

## 2025-01-30 DIAGNOSIS — I10 PRIMARY HYPERTENSION: ICD-10-CM

## 2025-01-30 DIAGNOSIS — N40.0 BENIGN PROSTATIC HYPERPLASIA WITHOUT LOWER URINARY TRACT SYMPTOMS: ICD-10-CM

## 2025-01-30 DIAGNOSIS — E66.01 CLASS 3 SEVERE OBESITY DUE TO EXCESS CALORIES WITH SERIOUS COMORBIDITY AND BODY MASS INDEX (BMI) OF 40.0 TO 44.9 IN ADULT: ICD-10-CM

## 2025-01-30 DIAGNOSIS — R06.09 DOE (DYSPNEA ON EXERTION): Primary | ICD-10-CM

## 2025-01-30 DIAGNOSIS — E11.9 TYPE 2 DIABETES MELLITUS WITHOUT COMPLICATION, WITHOUT LONG-TERM CURRENT USE OF INSULIN: ICD-10-CM

## 2025-01-30 DIAGNOSIS — E66.813 CLASS 3 SEVERE OBESITY DUE TO EXCESS CALORIES WITH SERIOUS COMORBIDITY AND BODY MASS INDEX (BMI) OF 40.0 TO 44.9 IN ADULT: ICD-10-CM

## 2025-01-30 PROCEDURE — 1159F MED LIST DOCD IN RCRD: CPT | Mod: CPTII,S$GLB,, | Performed by: NURSE PRACTITIONER

## 2025-01-30 PROCEDURE — 1125F AMNT PAIN NOTED PAIN PRSNT: CPT | Mod: CPTII,S$GLB,, | Performed by: NURSE PRACTITIONER

## 2025-01-30 PROCEDURE — 3072F LOW RISK FOR RETINOPATHY: CPT | Mod: CPTII,S$GLB,, | Performed by: NURSE PRACTITIONER

## 2025-01-30 PROCEDURE — 99214 OFFICE O/P EST MOD 30 MIN: CPT | Mod: S$GLB,,, | Performed by: NURSE PRACTITIONER

## 2025-01-30 PROCEDURE — 3008F BODY MASS INDEX DOCD: CPT | Mod: CPTII,S$GLB,, | Performed by: NURSE PRACTITIONER

## 2025-01-30 PROCEDURE — 99999 PR PBB SHADOW E&M-EST. PATIENT-LVL IV: CPT | Mod: PBBFAC,,, | Performed by: NURSE PRACTITIONER

## 2025-01-30 PROCEDURE — 1101F PT FALLS ASSESS-DOCD LE1/YR: CPT | Mod: CPTII,S$GLB,, | Performed by: NURSE PRACTITIONER

## 2025-01-30 PROCEDURE — 3288F FALL RISK ASSESSMENT DOCD: CPT | Mod: CPTII,S$GLB,, | Performed by: NURSE PRACTITIONER

## 2025-01-30 PROCEDURE — 3078F DIAST BP <80 MM HG: CPT | Mod: CPTII,S$GLB,, | Performed by: NURSE PRACTITIONER

## 2025-01-30 PROCEDURE — 3075F SYST BP GE 130 - 139MM HG: CPT | Mod: CPTII,S$GLB,, | Performed by: NURSE PRACTITIONER

## 2025-01-30 RX ORDER — SPIRONOLACTONE 25 MG/1
25 TABLET ORAL DAILY
Qty: 90 TABLET | Refills: 1 | Status: SHIPPED | OUTPATIENT
Start: 2025-01-30 | End: 2026-01-30

## 2025-01-30 RX ORDER — SEMAGLUTIDE 0.68 MG/ML
0.5 INJECTION, SOLUTION SUBCUTANEOUS
Qty: 3 EACH | Refills: 2 | Status: SHIPPED | OUTPATIENT
Start: 2025-01-30

## 2025-01-30 RX ORDER — FLUTICASONE PROPIONATE AND SALMETEROL 250; 50 UG/1; UG/1
1 POWDER RESPIRATORY (INHALATION) 2 TIMES DAILY
COMMUNITY
Start: 2025-01-29

## 2025-01-30 NOTE — PROGRESS NOTES
Subjective:       Patient ID: Javad Coughlin is a 69 y.o. male.    Chief Complaint: Shortness of Breath      History of Present Illness    CHIEF COMPLAINT:  Javad presents today for medication refills and respiratory symptoms.    RESPIRATORY:  He experiences fluid sensation in chest when lying down. He reports wheezing requiring rescue inhaler usage 1-2 times daily, particularly after minimal exertion such as retrieving mail. He has positioned a chair near the entrance for rest and rescue inhaler use after such activities. He uses Wixela inhaler twice daily as prescribed.    MEDICATIONS:  He has been out of spironolactone for two weeks and Ozempic 0.5 mg weekly injection for one week.    MUSCULOSKELETAL:  He has a history of herniated disc requiring surgical intervention. He currently experiences back spasms but denies taking muscle relaxants.    SOCIAL HISTORY:  He quit smoking 8 years ago. He occasionally uses marijuana for pain management. He has a history of addiction which has influenced his medical decisions regarding pain management.      ROS:  Respiratory: -cough, +wheezing, +chest congestion  Musculoskeletal: +muscle spasms          Objective:      Physical Exam    General: No acute distress. Well-developed. Well-nourished.  Eyes: EOMI. Sclerae anicteric.  HENT: Normocephalic. Atraumatic. Nares patent. Moist oral mucosa.  Ears: Bilateral TMs clear. Bilateral EACs clear.  Cardiovascular: Regular rate. Regular rhythm. No murmurs. No rubs. No gallops. Normal S1, S2.  Respiratory: Normal respiratory effort. Clear to auscultation bilaterally. No rales. No rhonchi. No wheezing.  Abdomen: Soft. Non-tender. Non-distended. Normoactive bowel sounds.  Musculoskeletal: No  obvious deformity.  Extremities: No lower extremity edema.  Neurological: Alert & oriented x3. No slurred speech. Normal gait.  Psychiatric: Normal mood. Normal affect. Good insight. Good judgment.  Skin: Warm. Dry. No rash.         "  Assessment:       1. HOOPER (dyspnea on exertion)    2. Primary hypertension    3. Type 2 diabetes mellitus with hyperglycemia, without long-term current use of insulin    4. Other emphysema        Plan:     Problem List Items Addressed This Visit       Primary hypertension    Overview     12/21/22 Dx approx 10 yrs ago   Dx - 10yrs,  " off and on" , irbesartan - 150mg Rx    2/8/23 cardiology evaluation with Dr. Segura   Primary hypertension - Primary        Aldactone 25 mg added to ARB  for better blood pressure control an attempt at improvement in shortness of breath.  There could be a component of diastolic heart failure.           Irbesartan 150mg daily  Aldactone 25mg po daily   Cardiology records noted;          Relevant Medications    spironolactone (ALDACTONE) 25 MG tablet> refilled has bene out x 2 weeks but has no crackles in lungs or swelling in lower ext       Emphysema lung seen on CT 2/14/23 Mild emphysema.   On wixexla BID- trial airsupra  Consider changin to breztri or trelegy as he is using rescue more than 2 time weekly- "daily"          Type 2 diabetes mellitus with hyperglycemia, without long-term current use of insulin        Relevant Medications    semaglutide (OZEMPIC) 0.25 mg or 0.5 mg (2 mg/3 mL) pen injector restart          Assessment & Plan    IMPRESSION:  - Assessed patient's respiratory symptoms, suspecting fluid retention and lung issues  - Determined spironolactone discontinuation may be contributing to symptoms  - Evaluated current inhaler regimen and decided to change rescue inhaler for better symptom control  - Considered patient's back pain, but focused on addressing respiratory concerns    ASTHMA/COPD:  - Continued Wixela twice daily.  - Javad reports experiencing chest congestion and wheezing, using rescue inhaler 1-2 times daily.  - Performed physical exam, including auscultation of patient's breathing.  - Assessed that the problem is more related to lungs than fluid " retention.  - Changed rescue inhaler to a new medication containing two different active ingredients.  - Explained proper use of rescue inhalers, emphasizing they should not be used daily unless necessary.  - Discussed that the new rescue inhaler contains 2 different medicines and should work better and longer than albuterol.  - Instructed patient to discontinue daily use of rescue inhaler.  - Initiated new rescue inhaler (sample provided) to replace albuterol: 2 puffs as needed, not to be used daily.  - Advised patient to contact the office to report effectiveness of new rescue inhaler.  - Scheduled follow-up as needed for prescription of new rescue inhaler if patient reports it is working well.    FLUID RETENTION:  - Restarted spironolactone.  - Javad reports being without spironolactone (fluid pill) for 2 weeks.  - Acknowledged that fluid retention may be contributing to the patient's symptoms.    BACK PAIN: offered pain specialist referral   - Javad reports ongoing back pain from previous injury.  - Inquired about use of muscle relaxants for back spasms.  - Suggested muscle relaxants for back spasms.    PAIN MANAGEMENT:  - Javad reports occasional cannabis use for pain management.  - Advised patient that cannabis use won't alleviate lung issues.        OTHER INSTRUCTIONS:  - Noted patient's past history of addiction and smoking cessation.          This note was generated with the assistance of ambient listening technology. Verbal consent was obtained by the patient and accompanying visitor(s) for the recording of patient appointment to facilitate this note. I attest to having reviewed and edited the generated note for accuracy, though some syntax or spelling errors may persist. Please contact the author of this note for any clarification.       Lab Results   Component Value Date    HGBA1C 6.6 (H) 10/31/2024   Lipid well controlled 10/2024

## 2025-01-30 NOTE — PROCEDURES
PROCEDURE:       Patient comes 4th for his 1st surveillance cystoscopic examination.  He denies any hematuria or irritative voiding symptoms.  His pathology was consistent with non muscle invasive bladder cancer, low risk disease, pTa, low-grade    Patient was placed on the examining table in a supine position.  His genitalia was prepped and draped in the usual sterile fashion.  Flexible cystourethroscopy was performed.  Bladder was free of any tumors stones or areas of erythema.  Patient tolerated the procedure well     Plan surveillance cystoscopic examination in 6 months-consider advancing to annual surveillance examinations, given his low risk disease.

## 2025-02-12 RX ORDER — ROSUVASTATIN CALCIUM 20 MG/1
TABLET, COATED ORAL
Qty: 90 TABLET | Refills: 1 | Status: SHIPPED | OUTPATIENT
Start: 2025-02-12

## 2025-02-12 NOTE — TELEPHONE ENCOUNTER
Fax received for medication refill request.    LOV 1/30/2025  Scheduled visit 5/12/2025    Requested Prescriptions     Pending Prescriptions Disp Refills    rosuvastatin (CRESTOR) 20 MG tablet 90 tablet 1     Sig: Take one tablet by mouth once daily

## 2025-02-14 DIAGNOSIS — G57.93 NEUROPATHY OF BOTH FEET: ICD-10-CM

## 2025-02-14 RX ORDER — GABAPENTIN 800 MG/1
1200 TABLET ORAL 2 TIMES DAILY
Qty: 270 TABLET | Refills: 0 | Status: SHIPPED | OUTPATIENT
Start: 2025-02-14

## 2025-02-14 NOTE — TELEPHONE ENCOUNTER
----- Message from [place] sent at 2025 11:23 AM CST -----  Contact: Pt  Javad Coughlin  MRN: [id number]  : 1955  PCP: Chuyita Marks  Home Phone      [phone]  Work Phone      Not on file.  Mobile          [phone]      MESSAGE:     Pt needs a refill of gabapentin (NEURONTIN) 800 MG tablet sent to [facility] on [address] [place]. Pt is completely out.         Syed  [phone]

## 2025-03-10 DIAGNOSIS — I10 PRIMARY HYPERTENSION: ICD-10-CM

## 2025-03-10 DIAGNOSIS — E11.65 TYPE 2 DIABETES MELLITUS WITH HYPERGLYCEMIA, WITHOUT LONG-TERM CURRENT USE OF INSULIN: ICD-10-CM

## 2025-03-11 RX ORDER — SPIRONOLACTONE 25 MG/1
25 TABLET ORAL DAILY
Qty: 90 TABLET | Refills: 1 | Status: SHIPPED | OUTPATIENT
Start: 2025-03-11 | End: 2026-03-11

## 2025-03-11 RX ORDER — ROSUVASTATIN CALCIUM 20 MG/1
TABLET, COATED ORAL
Qty: 90 TABLET | Refills: 1 | Status: SHIPPED | OUTPATIENT
Start: 2025-03-11

## 2025-03-11 RX ORDER — SEMAGLUTIDE 0.68 MG/ML
0.5 INJECTION, SOLUTION SUBCUTANEOUS
Qty: 3 EACH | Refills: 2 | Status: SHIPPED | OUTPATIENT
Start: 2025-03-11

## 2025-03-11 RX ORDER — IRBESARTAN 300 MG/1
150 TABLET ORAL NIGHTLY
Qty: 45 TABLET | Refills: 1 | Status: SHIPPED | OUTPATIENT
Start: 2025-03-11

## 2025-03-12 ENCOUNTER — TELEPHONE (OUTPATIENT)
Dept: INTERNAL MEDICINE | Facility: CLINIC | Age: 70
End: 2025-03-12
Payer: MEDICARE

## 2025-03-12 DIAGNOSIS — N40.0 BENIGN PROSTATIC HYPERPLASIA WITHOUT LOWER URINARY TRACT SYMPTOMS: ICD-10-CM

## 2025-03-12 RX ORDER — FLUTICASONE PROPIONATE AND SALMETEROL 250; 50 UG/1; UG/1
1 POWDER RESPIRATORY (INHALATION) 2 TIMES DAILY
Qty: 60 EACH | Refills: 1 | Status: SHIPPED | OUTPATIENT
Start: 2025-03-12

## 2025-03-12 RX ORDER — FINASTERIDE 5 MG/1
5 TABLET, FILM COATED ORAL DAILY
Qty: 90 TABLET | Refills: 3 | Status: SHIPPED | OUTPATIENT
Start: 2025-03-12

## 2025-03-12 NOTE — TELEPHONE ENCOUNTER
Spoke with patient and he states he started Mucinex today. States he is wheezing and feels it in his chest, so he will try Mucinex and give us a call tomorrow to get an appt if he is not feeling better.

## 2025-03-12 NOTE — TELEPHONE ENCOUNTER
Fax received for medication refill request.    LOV 1/30/2025  Scheduled visit 5/12/2025    Requested Prescriptions     Pending Prescriptions Disp Refills    finasteride (PROSCAR) 5 mg tablet 90 tablet 3     Sig: Take 1 tablet (5 mg total) by mouth once daily.    WIXELA INHUB 250-50 mcg/dose diskus inhaler       Sig: Inhale 1 puff into the lungs 2 (two) times daily.

## 2025-03-12 NOTE — TELEPHONE ENCOUNTER
----- Message from [place] sent at 3/12/2025  2:09 PM CDT -----  Contact: Pt  Javad JACOBO CedMRN: [id number]: 1955PCP: Chuyita Marks.Home Phone      [phone] Phone      Not on file.Mobile          [phone]: Pt is asking for medication be sent in to [facility] in [place]. Pt has issues getting a ride states he has fluid in his bronchial tubes. He states he gets headaches from coughing. Please advise [phone]

## 2025-03-12 NOTE — TELEPHONE ENCOUNTER
Pt has had a cough and is requesting a cough medication. Please see pt message below. He has had the cough since Saturday. Light yellow mucas. Please advise.

## 2025-03-12 NOTE — TELEPHONE ENCOUNTER
Pt has emphysema and really should be seen - e can use mucinex OTC to help thin the secretion and should cont his inhaler but I would advise him to see someone to have them listen to see if he needs steroids vs abx or both. Ge also has diabetes so steroids should be reserved for if actively wheezing or tight

## 2025-03-14 RX ORDER — TAMSULOSIN HYDROCHLORIDE 0.4 MG/1
0.4 CAPSULE ORAL DAILY
Qty: 30 CAPSULE | Refills: 11 | Status: SHIPPED | OUTPATIENT
Start: 2025-03-14 | End: 2026-03-14

## 2025-04-12 DIAGNOSIS — G57.93 NEUROPATHY OF BOTH FEET: ICD-10-CM

## 2025-04-14 RX ORDER — GABAPENTIN 800 MG/1
TABLET ORAL
Qty: 270 TABLET | Refills: 0 | Status: SHIPPED | OUTPATIENT
Start: 2025-04-14

## 2025-05-08 DIAGNOSIS — E11.9 TYPE 2 DIABETES MELLITUS WITHOUT COMPLICATION: ICD-10-CM

## 2025-05-08 RX ORDER — FLUTICASONE PROPIONATE AND SALMETEROL 250; 50 UG/1; UG/1
POWDER RESPIRATORY (INHALATION)
Qty: 60 EACH | Refills: 1 | Status: SHIPPED | OUTPATIENT
Start: 2025-05-08

## 2025-05-11 DIAGNOSIS — G57.93 NEUROPATHY OF BOTH FEET: ICD-10-CM

## 2025-05-12 RX ORDER — GABAPENTIN 800 MG/1
TABLET ORAL
Qty: 270 TABLET | Refills: 0 | Status: SHIPPED | OUTPATIENT
Start: 2025-05-12

## 2025-05-14 DIAGNOSIS — E11.9 TYPE 2 DIABETES MELLITUS WITHOUT COMPLICATION: ICD-10-CM

## 2025-07-07 RX ORDER — FLUTICASONE PROPIONATE AND SALMETEROL 250; 50 UG/1; UG/1
POWDER RESPIRATORY (INHALATION)
Qty: 60 EACH | Refills: 1 | Status: SHIPPED | OUTPATIENT
Start: 2025-07-07

## 2025-07-10 ENCOUNTER — OFFICE VISIT (OUTPATIENT)
Dept: INTERNAL MEDICINE | Facility: CLINIC | Age: 70
End: 2025-07-10
Payer: MEDICARE

## 2025-07-10 VITALS
DIASTOLIC BLOOD PRESSURE: 80 MMHG | SYSTOLIC BLOOD PRESSURE: 156 MMHG | RESPIRATION RATE: 20 BRPM | HEART RATE: 88 BPM | BODY MASS INDEX: 41.48 KG/M2 | HEIGHT: 72 IN | OXYGEN SATURATION: 97 % | WEIGHT: 306.25 LBS
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
## 2025-07-10 DIAGNOSIS — E78.2 MIXED HYPERLIPIDEMIA: ICD-10-CM

## 2025-07-10 DIAGNOSIS — M79.645 FINGER PAIN, LEFT: Primary | ICD-10-CM

## 2025-07-10 DIAGNOSIS — E66.813 CLASS 3 SEVERE OBESITY DUE TO EXCESS CALORIES WITH SERIOUS COMORBIDITY AND BODY MASS INDEX (BMI) OF 40.0 TO 44.9 IN ADULT: ICD-10-CM

## 2025-07-10 DIAGNOSIS — E78.49 OTHER HYPERLIPIDEMIA: ICD-10-CM

## 2025-07-10 DIAGNOSIS — I10 PRIMARY HYPERTENSION: ICD-10-CM

## 2025-07-10 DIAGNOSIS — R63.5 ABNORMAL WEIGHT GAIN: ICD-10-CM

## 2025-07-10 DIAGNOSIS — E11.9 TYPE 2 DIABETES MELLITUS WITHOUT COMPLICATION, WITHOUT LONG-TERM CURRENT USE OF INSULIN: ICD-10-CM

## 2025-07-10 DIAGNOSIS — E11.65 TYPE 2 DIABETES MELLITUS WITH HYPERGLYCEMIA, WITHOUT LONG-TERM CURRENT USE OF INSULIN: ICD-10-CM

## 2025-07-10 DIAGNOSIS — E66.01 CLASS 3 SEVERE OBESITY DUE TO EXCESS CALORIES WITH SERIOUS COMORBIDITY AND BODY MASS INDEX (BMI) OF 40.0 TO 44.9 IN ADULT: ICD-10-CM

## 2025-07-10 PROBLEM — E78.00 PURE HYPERCHOLESTEROLEMIA: Status: RESOLVED | Noted: 2022-12-21 | Resolved: 2025-07-10

## 2025-07-10 PROCEDURE — 99999 PR PBB SHADOW E&M-EST. PATIENT-LVL V: CPT | Mod: PBBFAC,,, | Performed by: NURSE PRACTITIONER

## 2025-07-10 RX ORDER — SEMAGLUTIDE 1.34 MG/ML
1 INJECTION, SOLUTION SUBCUTANEOUS
Qty: 3 ML | Refills: 11 | Status: SHIPPED | OUTPATIENT
Start: 2025-07-10 | End: 2026-07-10

## 2025-07-10 RX ORDER — IRBESARTAN 300 MG/1
150 TABLET ORAL 2 TIMES DAILY
Qty: 90 TABLET | Refills: 1 | Status: SHIPPED | OUTPATIENT
Start: 2025-07-10

## 2025-07-10 NOTE — PROGRESS NOTES
Subjective:       Patient ID: Javad Coughlin is a 70 y.o. male.    Chief Complaint: foreign body in finger      History of Present Illness    CHIEF COMPLAINT:  Javad presents today with left ring finger injury from a broken plate.    HISTORY OF PRESENT ILLNESS:  He sustained a left ring finger injury during a syncopal episode (due to intermittent fasting) while carrying a plate to the kitchen. The pain is most severe at night, consistently disrupting his sleep. The fall was weeks ago and it has healed over but still has a sensitive area to medial finger     GASTROINTESTINAL:  He reports occasional diarrhea with intermittent stomach cramps, having 2-3 bowel movements daily without vomiting.   MEDICAL HISTORY:  He has a history of bladder cancer with surgery and catheter placement in October. His last colonoscopy was in 2023, with next due in 2028.    MEDICATIONS:  He takes Irbesartan 150mg nightly (half of 300mg dose) for blood pressure management and uses a daily inhaler. He has cream available for rash flares but is not currently using it. On tremfya for psoriasis     DIABETES MANAGEMENT:  He is currently taking Ozempic 0.5mg weekly for diabetes management with occasional mild nausea post-injection. Despite being on Ozempic, he reports recent weight gain with current weight of 306 lbs. Ozempic dose is planned to increase to 1.0mg weekly.      ROS:  Constitutional: +weight gain  Gastrointestinal: +abdominal pain, -vomiting, +diarrhea  Musculoskeletal: +limb pain, +nightime pain  Neurological: +loss of consciousness, +syncope bp was low and was fasting          Objective:      Physical Exam    Vitals: Weight: 306 lbs. Blood pressure elevated.  General: No acute distress. Well-developed. Well-nourished.  Eyes: EOMI. Sclerae anicteric.  HENT: Normocephalic. Atraumatic. Nares patent. Moist oral mucosa.  Ears: Bilateral TMs clear. Bilateral EACs clear.  Cardiovascular: Regular rate. Regular rhythm. No murmurs. No  "rubs. No gallops. Normal S1, S2.  Respiratory: Normal respiratory effort. Clear to auscultation bilaterally. No rales. No rhonchi. No wheezing.  Abdomen: Soft. Non-tender. Non-distended. Normoactive bowel sounds.  Musculoskeletal: No  obvious deformity.  Extremities: No lower extremity edema.  Neurological: Alert & oriented x3. No slurred speech. Normal gait.  Psychiatric: Normal mood. Normal affect. Good insight. Good judgment.  Skin: Warm. Dry. No rash.  MSK: Hand/Wrist - Left: Open wound on left ring finger.          Assessment:       1. Finger pain, left    2. Fall, initial encounter    3. Type 2 diabetes mellitus without complication, without long-term current use of insulin    4. Mixed hyperlipidemia    5. Other hyperlipidemia    6. Primary hypertension    7. Class 3 severe obesity due to excess calories with serious comorbidity and body mass index (BMI) of 40.0 to 44.9 in adult    8. Type 2 diabetes mellitus with hyperglycemia, without long-term current use of insulin    9. Abnormal weight gain        Plan:     Problem List Items Addressed This Visit       Type 2 diabetes mellitus without complication, without long-term current use of insulin    Overview   12/21/22 Prior Dx 3 yrs ago; tx with Ozempic- was getting samples   Type 2 diabetes mellitus without complications  diet  Losing weight   Metformin ER 500mg daily   Notes he was 318 down to 301  12/22/22 urine MA normal   ARB  Statin          Relevant Medications    semaglutide (OZEMPIC) 1 mg/dose (4 mg/3 mL)    Other Relevant Orders    Hemoglobin A1C    Primary hypertension    Overview   12/21/22 Dx approx 10 yrs ago   Dx - 10yrs,  " off and on" , irbesartan - 150mg Rx    2/8/23 cardiology evaluation with Dr. Segura   Primary hypertension - Primary        Aldactone 25 mg added to ARB  for better blood pressure control an attempt at improvement in shortness of breath.  There could be a component of diastolic heart failure.           Irbesartan 150mg " daily  Aldactone 25mg po daily   Cardiology records noted;          Relevant Medications    irbesartan (AVAPRO) 300 MG tablet    Other Relevant Orders    CBC Auto Differential    Comprehensive Metabolic Panel    RESOLVED: Pure hypercholesterolemia    Overview   Dx ~ 3 yrs ago with DM2    Rosuvastatin 20mg          Class 3 severe obesity due to excess calories with serious comorbidity and body mass index (BMI) of 40.0 to 44.9 in adult    Overview   Wt Readings from Last 3 Encounters:   08/22/23 1418 (!) 140.8 kg (310 lb 6.5 oz)   08/22/23 1414 (!) 140.8 kg (310 lb 6.5 oz)   03/30/23 1513 133 kg (293 lb 3.4 oz)     Think this is a large component of HOOPER           Relevant Orders    TSH     Other Visit Diagnoses         Finger pain, left    -  Primary    Relevant Orders    X-Ray Finger 2 or More Views Left      Fall, initial encounter        Relevant Orders    Ambulatory referral/consult to Orthopedics      Other hyperlipidemia          Type 2 diabetes mellitus with hyperglycemia, without long-term current use of insulin        Relevant Medications    semaglutide (OZEMPIC) 1 mg/dose (4 mg/3 mL)      Abnormal weight gain        Relevant Orders    TSH          Assessment & Plan    E11.620 Type 2 diabetes mellitus with diabetic dermatitis  I11.0 Hypertensive heart disease with heart failure  S61.205A Unspecified open wound of left ring finger without damage to nail, initial encounter  R55 Syncope and collapse  I10 Essential (primary) hypertension  R19.7 Diarrhea, unspecified  Z85.51 Personal history of malignant neoplasm of bladder    TYPE 2 DIABETES MELLITUS WITH DIABETIC DERMATITIS:  - Assessed patient's weight gain and diabetes management.  - Increased Ozempic from 0.5 mg to 1.0 mg weekly injection for improved glycemic control and weight loss, despite occasional nausea with injections.  - Ordered fasting lab work to evaluate current diabetes control.  - Javad instructed to schedule fasting appointment for labs.  -  Follow up in 1 week to review results.    HYPERTENSIVE HEART DISEASE WITH HEART FAILURE:  - Monitored blood pressure, which is elevated today.  - Adjusted Irbesartan dosage to half in the morning and half at night for better control, noting previous adjustment was due to hypotension when fasting.  - Javad instructed to monitor blood pressure twice daily (morning and night) and submit log in 1 week.    LEFT RING FINGER OPEN WOUND:  - Evaluated closed wound on left ring finger from fall.  - Javad reports pain, especially at night causing insomnia.  - Ordered XR Left Ring Finger despite low likelihood of detecting ceramic foreign body.  - Referred patient to Dr. Fraire (hand surgeon) for evaluation.  - Instructed patient to schedule XR appointment.    SYNCOPE:  - Evaluated syncope episode that resulted in fall and finger injury.  - Javad reports passing out while holding a plate after taking medication on empty stomach and a long intermittent fast .  - Determined syncope possibly related to not eating. Dehydration/low bp. Low blood sugar.     DIARRHEA:  - Javad reports diarrhea, which they mention could be a form of constipation.  - Sometimes experiences bowel movements 2-3 times a day .    HISTORY OF BLADDER CANCER:      He is due for routine labs- bp is too high since cutting back on avapro- will go back to full dose but feels rodney comfortable doing 1/2 BID and will monitor blood pressure  Increase the ozempic due to weight gain and check A1C  F.u with Dr Fraire for possible foreign body          This note was generated with the assistance of ambient listening technology. Verbal consent was obtained by the patient and accompanying visitor(s) for the recording of patient appointment to facilitate this note. I attest to having reviewed and edited the generated note for accuracy, though some syntax or spelling errors may persist. Please contact the author of this note for any clarification.

## 2025-07-11 ENCOUNTER — TELEPHONE (OUTPATIENT)
Dept: ORTHOPEDICS | Facility: CLINIC | Age: 70
End: 2025-07-11
Payer: MEDICARE

## 2025-07-11 DIAGNOSIS — R52 PAIN: Primary | ICD-10-CM

## 2025-07-11 NOTE — TELEPHONE ENCOUNTER
Spoke top patient regarding appt request. Patient was given an appt with  on 7/14/25 in [place]. Patient denied the appt due to transportation issues. [facility] Patient stated they will go to Urgent Care instead

## 2025-07-11 NOTE — TELEPHONE ENCOUNTER
Called and spoke with pt, he accepted appointment wih Dr. Fraire in [place] on 7/3 and is aware of needing xrays before appointment and explained if his pain becomes unbearable to report to ER. Pt v/u          Charity Ramirez PA-C [place], [place], [place]  If there is a foreign body in his finger then he probably needs to see [facility]. He can see him on the 23rd here. If he wants something sooner then I can see him but he still may need to see Dr. Fraire.   Previous Messages       ----- Message -----  From: Mary Cox MA  Sent: 7/11/2025  12:55 PM CDT  To: Charity Ramirez PA-C  Subject: FW: Referral                                    Pt was suppose to have his xray appointment this afternoon but he cancelled it. I know you were wanting to see the xray results before deciding how to schedule. Please advise  ----- Message -----  From: Dante Albert  Sent: 7/11/2025   9:44 AM CDT  To: [facility]; [facility]*  Subject: Referral                                        Pt referral was to see Dr. Fraire, Pt states he would like local due to transportation. Pt would also like a sooner appt. I was not able to schedule w/ Dr Fraire until next year and Pt would like to be seen ASAP.

## 2025-07-14 ENCOUNTER — PATIENT OUTREACH (OUTPATIENT)
Dept: ADMINISTRATIVE | Facility: HOSPITAL | Age: 70
End: 2025-07-14
Payer: MEDICARE

## 2025-07-14 VITALS — SYSTOLIC BLOOD PRESSURE: 131 MMHG | DIASTOLIC BLOOD PRESSURE: 62 MMHG

## 2025-07-14 DIAGNOSIS — F17.211 CIGARETTE NICOTINE DEPENDENCE IN REMISSION: Primary | ICD-10-CM

## 2025-07-14 DIAGNOSIS — Z12.2 SCREENING FOR MALIGNANT NEOPLASM OF RESPIRATORY ORGAN: ICD-10-CM

## 2025-07-14 DIAGNOSIS — Z71.89 COMPLEX CARE COORDINATION: ICD-10-CM

## 2025-07-14 NOTE — PROGRESS NOTES
Left detailed message for patient to return call to complete [facility]BC outreach.  Topics to discuss:  Foot Exam  Admission or ED Risk: 42.9%  Digital Medicine Diabetes Program Eligibility: Needs Review  Schedule DM lab visit (Hemoglobin A1c and Urine Micro albumin)  Schedule Enhanced Annual Wellness Visit  Obtain remote B/P  Discuss Digital Medicine Hypertension Program Eligibility: Needs Review  Discuss [facility]: Refer to [facility]   LDCT Lung Screen    LOV: 7/10/2025  Colonoscopy up to date, next due 11/13/2025  Eye exam up to date, next due 10/24/2025  SDOH reviewed 5/14/2025

## 2025-07-14 NOTE — PROGRESS NOTES
Care Coordination Encounter Details:       MyChart Portal Status:         [x]  Reviewed MyChart Portal Status offered / enrolled if applicable        Additional Notes:     MyChart Outcomes: Pt is enrolled & active          Updates Requested / Reviewed:        Updated Care Coordination Note, Care Everywhere, , External Sources: [facility] and [facility], Care Team Updated, Removed  or Duplicate Orders, and Immunizations Reconciliation Completed or Queried: [place]         Health Maintenance Screening(s) Due:      Health Maintenance Topics Overdue:  VBHM Score: 4     Urine Screening  Hemoglobin A1c  Foot Exam  Uncontrolled BP    Shingles/Zoster Vaccine          Health Maintenance Topic(s) Outreach Outcomes & Actions Taken:    Colorectal Cancer Screening - Outreach Outcomes & Actions Taken  : Colonoscopy up to date, next due 2025.    Eye Exam - Outreach Outcomes & Actions Taken  : Eye Exam up to date, next due 10/24/2025.    Blood Pressure - Outreach Outcomes & Actions Taken  : Remote B/P: 131/62. Digital Medicine Hypertension Program: Needs Review.    Lab(s) - Outreach Outcomes & Actions Taken  : Patient has lab orders, patient states he will collect labs at [facility] lab.    Low Dose CT Screening - Outreach Outcomes & Actions Taken  : Low Dose CT Order Placed and Low Dose CT Scheduled for 2025.     Additional Notes:             Chronic Disease Management:     Diabetes Measures        Lab Results   Component Value Date    HGBA1C 6.6 (H) 10/31/2024           [x]  Reviewed chart for active Diabetes diagnosis     []  Scheduled necessary follow up appointments if needed         Additional Notes:  Digital Medicine Diabetes Registry: Needs Review           Hypertension Measures        BP Readings from Last 1 Encounters:   25 131/62           [x]  Reviewed chart for active Hypertension diagnosis     [x]  Reviewed & documented Home BP Cuff     [x]  Documented a Remote BP if  needed & applicable     []  Scheduled necessary follow up appointments with Primary Care if needed         Additional Notes:             Provider Team Continuity:     Last PCP Visit Date: 7/10/2025          [x]  Reviewed Primary Care Provider Visits, Annual Wellness Visit, and Future          Appointments to ensure appointments have been scheduled and/or           completed        Additional Notes:  Enhanced Annual Wellness Visit scheduled for 11/12/2025.           Social Determinants of Health          [x]  Reviewed, completed, and/or updated the following sections:                  Food Insecurity, Transportation Needs, Financial Resource Strain,                 Tobacco Use        Additional Notes:  SDOH reviewed 5/14/2025.           Care Management, Digital Medicine, and/or Education Referrals    OPCM Risk Score: 42.9         Next Steps - Referral Actions: Referral placed for OPCM for Financial and Food Insecurity, Admission or ED Risk: 42.9%        Additional Notes:

## 2025-07-16 DIAGNOSIS — J41.1 MUCOPURULENT CHRONIC BRONCHITIS: ICD-10-CM

## 2025-07-16 RX ORDER — ALBUTEROL SULFATE 90 UG/1
2 INHALANT RESPIRATORY (INHALATION) EVERY 6 HOURS PRN
Qty: 18 G | Refills: 5 | Status: SHIPPED | OUTPATIENT
Start: 2025-07-16 | End: 2026-07-16

## 2025-07-21 ENCOUNTER — HOSPITAL ENCOUNTER (OUTPATIENT)
Dept: RADIOLOGY | Facility: HOSPITAL | Age: 70
Discharge: HOME | End: 2025-07-21
Attending: NURSE PRACTITIONER
Payer: MEDICARE

## 2025-07-21 DIAGNOSIS — Z12.2 SCREENING FOR MALIGNANT NEOPLASM OF RESPIRATORY ORGAN: ICD-10-CM

## 2025-07-21 DIAGNOSIS — M79.645 FINGER PAIN, LEFT: ICD-10-CM

## 2025-07-21 DIAGNOSIS — F17.211 CIGARETTE NICOTINE DEPENDENCE IN REMISSION: ICD-10-CM

## 2025-07-21 DIAGNOSIS — R52 PAIN: ICD-10-CM

## 2025-07-21 PROCEDURE — 73130 X-RAY EXAM OF HAND: CPT | Mod: TC,LT

## 2025-07-21 PROCEDURE — 73130 X-RAY EXAM OF HAND: CPT | Mod: 26,LT,, | Performed by: RADIOLOGY

## 2025-07-21 PROCEDURE — 73140 X-RAY EXAM OF FINGER(S): CPT | Mod: TC,LT

## 2025-07-21 PROCEDURE — 71271 CT THORAX LUNG CANCER SCR C-: CPT | Mod: 26,,, | Performed by: RADIOLOGY

## 2025-07-21 PROCEDURE — 71271 CT THORAX LUNG CANCER SCR C-: CPT | Mod: TC

## 2025-07-21 PROCEDURE — 73140 X-RAY EXAM OF FINGER(S): CPT | Mod: 26,XU,LT, | Performed by: RADIOLOGY

## 2025-07-23 ENCOUNTER — OFFICE VISIT (OUTPATIENT)
Dept: ORTHOPEDICS | Facility: CLINIC | Age: 70
End: 2025-07-23
Payer: MEDICARE

## 2025-07-23 ENCOUNTER — LAB VISIT (OUTPATIENT)
Dept: LAB | Facility: HOSPITAL | Age: 70
End: 2025-07-23
Attending: NURSE PRACTITIONER
Payer: MEDICARE

## 2025-07-23 DIAGNOSIS — E78.2 MIXED HYPERLIPIDEMIA: ICD-10-CM

## 2025-07-23 DIAGNOSIS — E11.9 TYPE 2 DIABETES MELLITUS WITHOUT COMPLICATION: ICD-10-CM

## 2025-07-23 DIAGNOSIS — E11.9 TYPE 2 DIABETES MELLITUS WITHOUT COMPLICATION, WITHOUT LONG-TERM CURRENT USE OF INSULIN: ICD-10-CM
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
## 2025-07-23 DIAGNOSIS — I10 PRIMARY HYPERTENSION: ICD-10-CM

## 2025-07-23 DIAGNOSIS — R63.5 ABNORMAL WEIGHT GAIN: ICD-10-CM

## 2025-07-23 DIAGNOSIS — E66.813 CLASS 3 SEVERE OBESITY DUE TO EXCESS CALORIES WITH SERIOUS COMORBIDITY AND BODY MASS INDEX (BMI) OF 40.0 TO 44.9 IN ADULT: ICD-10-CM

## 2025-07-23 DIAGNOSIS — S60.459A FOREIGN BODY OF FINGER: Primary | ICD-10-CM

## 2025-07-23 LAB
ABSOLUTE EOSINOPHIL (OHS): 0.13 K/UL
ABSOLUTE MONOCYTE (OHS): 0.54 K/UL (ref 0.3–1)
ABSOLUTE NEUTROPHIL COUNT (OHS): 4.74 K/UL (ref 1.8–7.7)
ALBUMIN SERPL BCP-MCNC: 3.9 G/DL (ref 3.5–5.2)
ALBUMIN/CREAT UR: 24.2 UG/MG
ALP SERPL-CCNC: 82 UNIT/L (ref 40–150)
ALT SERPL W/O P-5'-P-CCNC: 19 UNIT/L (ref 10–44)
ANION GAP (OHS): 10 MMOL/L (ref 8–16)
AST SERPL-CCNC: 24 UNIT/L (ref 11–45)
BASOPHILS # BLD AUTO: 0.04 K/UL
BASOPHILS NFR BLD AUTO: 0.6 %
BILIRUB SERPL-MCNC: 0.8 MG/DL (ref 0.1–1)
BUN SERPL-MCNC: 15 MG/DL (ref 8–23)
CALCIUM SERPL-MCNC: 9.1 MG/DL (ref 8.7–10.5)
CHLORIDE SERPL-SCNC: 103 MMOL/L (ref 95–110)
CHOLEST SERPL-MCNC: 111 MG/DL (ref 120–199)
CHOLEST/HDLC SERPL: 3.5 {RATIO} (ref 2–5)
CO2 SERPL-SCNC: 23 MMOL/L (ref 23–29)
CREAT SERPL-MCNC: 1.2 MG/DL (ref 0.5–1.4)
CREAT UR-MCNC: 136.5 MG/DL (ref 23–375)
EAG (OHS): 137 MG/DL (ref 68–131)
ERYTHROCYTE [DISTWIDTH] IN BLOOD BY AUTOMATED COUNT: 15.6 % (ref 11.5–14.5)
GFR SERPLBLD CREATININE-BSD FMLA CKD-EPI: >60 ML/MIN/1.73/M2
GLUCOSE SERPL-MCNC: 118 MG/DL (ref 70–110)
HBA1C MFR BLD: 6.4 % (ref 4–5.6)
HCT VFR BLD AUTO: 38.1 % (ref 40–54)
HDLC SERPL-MCNC: 32 MG/DL (ref 40–75)
HDLC SERPL: 28.8 % (ref 20–50)
HGB BLD-MCNC: 12.9 GM/DL (ref 14–18)
IMM GRANULOCYTES # BLD AUTO: 0.02 K/UL (ref 0–0.04)
IMM GRANULOCYTES NFR BLD AUTO: 0.3 % (ref 0–0.5)
LDLC SERPL CALC-MCNC: 52.8 MG/DL (ref 63–159)
LYMPHOCYTES # BLD AUTO: 1.17 K/UL (ref 1–4.8)
MCH RBC QN AUTO: 28.5 PG (ref 27–31)
MCHC RBC AUTO-ENTMCNC: 33.9 G/DL (ref 32–36)
MCV RBC AUTO: 84 FL (ref 82–98)
MICROALBUMIN UR-MCNC: 33 UG/ML (ref ?–5000)
NONHDLC SERPL-MCNC: 79 MG/DL
NUCLEATED RBC (/100WBC) (OHS): 0 /100 WBC
PLATELET # BLD AUTO: 198 K/UL (ref 150–450)
PMV BLD AUTO: 9.7 FL (ref 9.2–12.9)
POTASSIUM SERPL-SCNC: 4.5 MMOL/L (ref 3.5–5.1)
PROT SERPL-MCNC: 7.5 GM/DL (ref 6–8.4)
RBC # BLD AUTO: 4.53 M/UL (ref 4.6–6.2)
RELATIVE EOSINOPHIL (OHS): 2 %
RELATIVE LYMPHOCYTE (OHS): 17.6 % (ref 18–48)
RELATIVE MONOCYTE (OHS): 8.1 % (ref 4–15)
RELATIVE NEUTROPHIL (OHS): 71.4 % (ref 38–73)
SODIUM SERPL-SCNC: 136 MMOL/L (ref 136–145)
T4 FREE SERPL-MCNC: 0.68 NG/DL (ref 0.71–1.51)
TRIGL SERPL-MCNC: 131 MG/DL (ref 30–150)
TSH SERPL-ACNC: 7.87 UIU/ML (ref 0.4–4)
WBC # BLD AUTO: 6.64 K/UL (ref 3.9–12.7)

## 2025-07-23 PROCEDURE — 36415 COLL VENOUS BLD VENIPUNCTURE: CPT

## 2025-07-23 PROCEDURE — 84439 ASSAY OF FREE THYROXINE: CPT

## 2025-07-23 PROCEDURE — 83036 HEMOGLOBIN GLYCOSYLATED A1C: CPT

## 2025-07-23 PROCEDURE — 80053 COMPREHEN METABOLIC PANEL: CPT

## 2025-07-23 PROCEDURE — 85025 COMPLETE CBC W/AUTO DIFF WBC: CPT

## 2025-07-23 PROCEDURE — 82570 ASSAY OF URINE CREATININE: CPT

## 2025-07-23 PROCEDURE — 99999 PR PBB SHADOW E&M-EST. PATIENT-LVL III: CPT | Mod: PBBFAC,,, | Performed by: ORTHOPAEDIC SURGERY

## 2025-07-23 PROCEDURE — 80061 LIPID PANEL: CPT

## 2025-07-23 NOTE — PROGRESS NOTES
CC:  Foreign body left ring finger        HPI:  Javad Coughlin is a very pleasant 70 y.o. male sustained injury to his left hand about a month ago when he dropped a plate with multiple fragments sort of exploding into his left hand   He was seen in the emergency room where the wounds were cleaned up and repaired but he continued to have pain in his left ring finger   He is describing pain and a sort of funny tingling sensation but no real numbness or tingling reported         PAST MEDICAL HISTORY:   Past Medical History:   Diagnosis Date    COPD (chronic obstructive pulmonary disease)     Diabetes mellitus, type 2     Eczema     Plaque psoriasis     Rosacea      PAST SURGICAL HISTORY:   Past Surgical History:   Procedure Laterality Date    ADENOIDECTOMY      SKIN GRAFT      SPINE SURGERY      TONSILLECTOMY      TURBT (TRANSURETHRAL RESECTION OF BLADDER TUMOR) N/A 9/25/2024    Procedure: TURBT (TRANSURETHRAL RESECTION OF BLADDER TUMOR) (MITOMYCIN AT END OF CASE);  Surgeon: Alex Hood MD;  Location: [facility] OR;  Service: Urology;  Laterality: N/A;     FAMILY HISTORY:   Family History   Problem Relation Name Age of Onset    Cancer Mother Samia Coughlin     Parkinsonism Sister Sadia Eastman     Diabetes Sister Sadia Eastman     Alcohol abuse Brother Hiram Coughlin         Quit Drinking and Smoking    Asbestos Brother Hiram Coughlin     Parkinsonism Brother Hiram Coughlin      SOCIAL HISTORY: Social History[1]    MEDICATIONS: Current Medications[2]  ALLERGIES: Review of patient's allergies indicates:  No Known Allergies    Review of Systems:  Constitutional: no fever or chills  ENT: no nasal congestion or sore throat  Respiratory: no cough or shortness of breath  Cardiovascular: no chest pain or palpitations  Gastrointestinal: no nausea or vomiting, PUD, GERD, NSAID intolerance  Genitourinary: no hematuria or dysuria  Integument/Breast: no rash or pruritis  Hematologic/Lymphatic: no easy bruising or  "lymphadenopathy  Musculoskeletal: see HPI  Neurological: no seizures or tremors  Behavioral/Psych: no auditory or visual hallucinations      Physical Exam   There were no vitals filed for this visit.    Constitutional: Oriented to person, place, and time. Appears well-developed and well-nourished.   HENT:   Head: Normocephalic and atraumatic.   Nose: Nose normal.   Eyes: No scleral icterus.   Neck: Normal range of motion.   Cardiovascular: Normal rate and regular rhythm.    Pulses:       Radial pulses are 2+ on the right side, and 2+ on the left side.   Pulmonary/Chest: Effort normal and breath sounds normal.   Abdominal: Soft.   Neurological: Alert and oriented to person, place, and time.   Skin: Skin is warm.   Psychiatric: Normal mood and affect.     MUSCULOS left hand the wounds have all healed well there was no evidence of infection    KELETAL UPPER EXTREMITY:  Examination volar base of the left ring finger on the ulnar side mildly positive Tinel sign sensation intact range of motion of the ring fingers slightly decreased but there was no triggering   No evidence of infection  He does have a tender area which is actually hard on the        X-ray shows no obvious foreign body in the soft tissue  Diagnostic Studies:  As above        Assessment:  Foreign body of left ring finger volar soft tissue    Plan:  The patient would like to set up outpatient surgery for exploration and removal of foreign body left ring finger      The risks and benefits of surgery were discussed with the patient today and they understand.  The consent was signed in the office for surgery.      Juan Fraire MD (Jay)  [facility]  Orthopedic Upper Extremity Surgery            [1]   Social History  Socioeconomic History    Marital status:    Tobacco Use    Smoking status: Former     Current packs/day: 0.00     Average packs/day: 0.5 packs/day for 53.1 years (26.5 ttl pk-yrs)     Types: Cigarettes     Start date: 6/12/1966 "     Quit date: 2019     Years since quittin.0     Passive exposure: Never    Smokeless tobacco: Never    Tobacco comments:     1-2 packs a day   Substance and Sexual Activity    Alcohol use: Not Currently    Drug use: Never    Sexual activity: Yes     Partners: Female     Birth control/protection: Post-menopausal     Social Drivers of Health     Financial Resource Strain: Medium Risk (2025)    Overall Financial Resource Strain (CARDIA)     Difficulty of Paying Living Expenses: Somewhat hard   Food Insecurity: Food Insecurity Present (2025)    Hunger Vital Sign     Worried About Running Out of Food in the Last Year: Sometimes true     Ran Out of Food in the Last Year: Sometimes true   Transportation Needs: Unmet Transportation Needs (2025)    PRAPARE - Transportation     Lack of Transportation (Medical): Yes     Lack of Transportation (Non-Medical): No   Physical Activity: Unknown (2025)    Exercise Vital Sign     Days of Exercise per Week: 0 days   Stress: No Stress Concern Present (2025)    [employer] - Occupational Stress Questionnaire     Feeling of Stress : Only a little   Housing Stability: Low Risk  (2025)    Housing Stability Vital Sign     Unable to Pay for Housing in the Last Year: No     Number of Times Moved in the Last Year: 0     Homeless in the Last Year: No   [2]   Current Outpatient Medications:     albuterol (PROVENTIL/VENTOLIN HFA) 90 mcg/actuation inhaler, Inhale 2 puffs into the lungs every 6 (six) hours as needed for Wheezing or Shortness of Breath (cough and wheezing)., Disp: 18 g, Rfl: 5    finasteride (PROSCAR) 5 mg tablet, Take 1 tablet (5 mg total) by mouth once daily., Disp: 90 tablet, Rfl: 3    gabapentin (NEURONTIN) 800 MG tablet, TAKE 1.5 TABLETS BY MOUTH 2 TIMES DAILY., Disp: 270 tablet, Rfl: 0    irbesartan (AVAPRO) 300 MG tablet, Take 0.5 tablets (150 mg total) by mouth 2 (two) times a day., Disp: 90 tablet, Rfl: 1     mometasone 0.1% (ELOCON) 0.1 % cream, , Disp: , Rfl:     rosuvastatin (CRESTOR) 20 MG tablet, Take one tablet by mouth once daily, Disp: 90 tablet, Rfl: 1    semaglutide (OZEMPIC) 1 mg/dose (4 mg/3 mL), Inject 1 mg into the skin every 7 days., Disp: 3 mL, Rfl: 11    spironolactone (ALDACTONE) 25 MG tablet, Take 1 tablet (25 mg total) by mouth once daily., Disp: 90 tablet, Rfl: 1    tamsulosin (FLOMAX) 0.4 mg Cap, Take 1 capsule (0.4 mg total) by mouth once daily., Disp: 30 capsule, Rfl: 11    TREMFYA 100 mg/mL AtIn, Inject into the skin., Disp: , Rfl:     WIXELA INHUB 250-50 mcg/dose diskus inhaler, INHALE 1 PUFF INTO THE LUNGS TWICE A DAY, Disp: 60 each, Rfl: 1    Current Facility-Administered Medications:     nitrofurantoin (macrocrystal-monohydrate) 100 MG capsule 100 mg, 100 mg, Oral, 1 time in Clinic/HOD, Alex Hood MD

## 2025-07-25 ENCOUNTER — TELEPHONE (OUTPATIENT)
Dept: INTERNAL MEDICINE | Facility: CLINIC | Age: 70
End: 2025-07-25
Payer: MEDICARE

## 2025-07-25 ENCOUNTER — HOSPITAL ENCOUNTER (OUTPATIENT)
Dept: PULMONOLOGY | Facility: HOSPITAL | Age: 70
Discharge: HOME | End: 2025-07-25
Attending: NURSE PRACTITIONER
Payer: MEDICARE

## 2025-07-25 DIAGNOSIS — Z01.818 PREOPERATIVE TESTING: ICD-10-CM

## 2025-07-25 LAB
OHS QRS DURATION: 84 MS
OHS QTC CALCULATION: 418 MS

## 2025-07-25 PROCEDURE — 99900035 HC TECH TIME PER 15 MIN (STAT)

## 2025-07-25 PROCEDURE — 93010 ELECTROCARDIOGRAM REPORT: CPT | Mod: ,,, | Performed by: INTERNAL MEDICINE

## 2025-07-25 PROCEDURE — 93005 ELECTROCARDIOGRAM TRACING: CPT

## 2025-07-25 NOTE — TELEPHONE ENCOUNTER
----- Message from Christina sent at 2025  8:49 AM CDT -----  Contact: Patient  Javad Coughlin  MRN: [id number]  : 1955  PCP: Chuyita Marks  Home Phone      [phone]  Work Phone      Not on file.  Mobile          [phone]      MESSAGE:     Pt called stating he would like to discuss test results about blood pressure. Please advise.       [phone]

## 2025-07-25 NOTE — TELEPHONE ENCOUNTER
Patient states that he was calling to report that he is having surgery on his finger on 7/30/25, and was instructed to hold his Monday dose of Ozempic. He will take the Ozempic Wednesday evening after surgery.

## 2025-08-07 ENCOUNTER — TELEPHONE (OUTPATIENT)
Dept: ORTHOPEDICS | Facility: CLINIC | Age: 70
End: 2025-08-07
Payer: MEDICARE

## 2025-08-07 NOTE — TELEPHONE ENCOUNTER
Returned pt call, I explained to him that he can not wait until Dr. Fraire is back in [place] because he will not be back until the end of the month, so we would need him to go to [place] to be seen. Pt is scheduled with Dr. Fraire's next available on .      ----- Message from Charity Ramirez PA-C sent at 2025  1:11 PM CDT -----  Contact: PATIENT  No, he can't wait that long. Sutures need to be removed. If he can't see me tomorrow he will need to see a PA or Dr. Fraire in [place] next week.  ----- Message -----  From: Mary Cox MA  Sent: 2025   1:03 PM CDT  To: Charity Ramirez PA-C    I am not sure he can wait another 3 weeks to see Dr. Fraire. His surgery was on . Please advise  ----- Message -----  From: Cary Holland  Sent: 2025  12:19 PM CDT  To: James Nash Staff    Javad Coughlin  MRN: [id number]  : 1955  PCP: Chuyita Marks  Home Phone      [phone]  Work Phone      Not on file.  Mobile          [phone]      MESSAGE: Patient would like to reschedule his post op that is scheduled for tomorrow.  He would like to reschedule it with Dr. Fraire when he comes back to [place].        Phone: [phone] (leave message)

## 2025-08-13 ENCOUNTER — OFFICE VISIT (OUTPATIENT)
Dept: ORTHOPEDICS | Facility: CLINIC | Age: 70
End: 2025-08-13
Payer: MEDICARE

## 2025-08-13 DIAGNOSIS — S60.459A FOREIGN BODY OF FINGER: Primary | ICD-10-CM

## 2025-08-13 PROCEDURE — 99024 POSTOP FOLLOW-UP VISIT: CPT | Mod: S$GLB,,, | Performed by: ORTHOPAEDIC SURGERY

## 2025-08-13 PROCEDURE — 4010F ACE/ARB THERAPY RXD/TAKEN: CPT | Mod: CPTII,S$GLB,, | Performed by: ORTHOPAEDIC SURGERY

## 2025-08-13 PROCEDURE — 3072F LOW RISK FOR RETINOPATHY: CPT | Mod: CPTII,S$GLB,, | Performed by: ORTHOPAEDIC SURGERY

## 2025-08-13 PROCEDURE — 99999 PR PBB SHADOW E&M-EST. PATIENT-LVL III: CPT | Mod: PBBFAC,,, | Performed by: ORTHOPAEDIC SURGERY

## 2025-08-13 PROCEDURE — 3288F FALL RISK ASSESSMENT DOCD: CPT | Mod: CPTII,S$GLB,, | Performed by: ORTHOPAEDIC SURGERY

## 2025-08-13 PROCEDURE — 1159F MED LIST DOCD IN RCRD: CPT | Mod: CPTII,S$GLB,, | Performed by: ORTHOPAEDIC SURGERY

## 2025-08-13 PROCEDURE — 1125F AMNT PAIN NOTED PAIN PRSNT: CPT | Mod: CPTII,S$GLB,, | Performed by: ORTHOPAEDIC SURGERY

## 2025-08-13 PROCEDURE — 3066F NEPHROPATHY DOC TX: CPT | Mod: CPTII,S$GLB,, | Performed by: ORTHOPAEDIC SURGERY

## 2025-08-13 PROCEDURE — 3044F HG A1C LEVEL LT 7.0%: CPT | Mod: CPTII,S$GLB,, | Performed by: ORTHOPAEDIC SURGERY

## 2025-08-13 PROCEDURE — 1100F PTFALLS ASSESS-DOCD GE2>/YR: CPT | Mod: CPTII,S$GLB,, | Performed by: ORTHOPAEDIC SURGERY

## 2025-08-13 PROCEDURE — 3061F NEG MICROALBUMINURIA REV: CPT | Mod: CPTII,S$GLB,, | Performed by: ORTHOPAEDIC SURGERY
